# Patient Record
Sex: MALE | Race: WHITE | NOT HISPANIC OR LATINO | Employment: FULL TIME | URBAN - METROPOLITAN AREA
[De-identification: names, ages, dates, MRNs, and addresses within clinical notes are randomized per-mention and may not be internally consistent; named-entity substitution may affect disease eponyms.]

---

## 2016-12-29 NOTE — PROGRESS NOTES
SUBJECTIVE:                                                    Shlomo Nelson is a 26 year old male who presents to clinic today for the following health issues:      HPI       ABDOMINAL PAIN     Onset: November 2016     Description:   Character: fullness/bloating  Location: epigastric region  Radiation: None    Intensity: mild    Progression of Symptoms:  worsening    Accompanying Signs & Symptoms:  Fever/Chills?: no   Gas/Bloating: YES  Nausea: no   Vomitting: no   Diarrhea?: no   Constipation: Yes, every day to every 2 days   Dysuria or Hematuria: no    History:   Trauma: no   Previous similar pain: no    Previous tests done: none    Precipitating factors:   Does the pain change with:     Food: YES- worse feeling of bloating     BM: Yes    Urination: no     Alleviating factors:  none    Therapies Tried and outcome: none    LMP:  not applicable      - Did bowel cleanse, hurt for 2 days, then got better for 1.5-2 weeks, then symptoms returned for about 2 weeks off and on, now steady for 1 week  - Probiotic for 5 days now   - Pt never started taking Omeprazole (has at home)            Medication Followup of Chantix    Taking Medication as prescribed: yes, working really well     Side Effects:  None    Medication Helping Symptoms:  yes       Plan from 11/22/16  1. Tobacco    - Discussed chantix use, duration, and side effects    - Information provided     2. Abdominal pain    - Discussed with patient gastritis vs. GERD vs. Constipation vs. GB disorder    - Recommend start PPI, discussed use and side effects    - Also recommend bowel clean out followed by fiber supplementation    - Discussed if no change in 2 weeks, return to clinic, will consider ABD US, CT, and/or EGD    - If better, continue on PPI for 6-8 weeks then D/C, if symptoms return should return to clinic       Problem list and histories reviewed & adjusted, as indicated.  Additional history: as documented      Labs reviewed in EPIC  Problem list,  "Medication list, Allergies, and Medical/Social/Surgical histories reviewed in Psychiatric and updated as appropriate.    ROS:  Constitutional, HEENT, cardiovascular, pulmonary, gi and gu systems are negative, except as otherwise noted.    OBJECTIVE:                                                    /76 mmHg  Pulse 76  Temp(Src) 98.6  F (37  C) (Oral)  Resp 12  Ht 5' 7.72\" (1.72 m)  Wt 190 lb (86.183 kg)  BMI 29.13 kg/m2  SpO2 95%  Body mass index is 29.13 kg/(m^2).  GENERAL APPEARANCE: healthy, alert and no distress  EYES: Eyes grossly normal to inspection, PERRLA, conjunctivae and sclerae without injection or discharge, EOM intact   RESP: Lungs clear to auscultation - no rales, rhonchi or wheezes   CV: Regular rates and rhythm, normal S1 S2, no S3 or S4, no murmur, click or rub  ABDOMEN: Soft, mildly tender in epigastric area, no hepatosplenomegaly, no masses and bowel sounds normal, negative  Tolu's   MS: No musculoskeletal defects are noted and gait is age appropriate without ataxia   SKIN: No suspicious lesions or rashes, hydration status appears adeuqate with normal skin turgor    PSYCH: Alert and oriented x3; speech- coherent , normal rate and volume; able to articulate logical thoughts, able to abstract reason, no tangential thoughts, no hallucinations or delusions, mentation appears normal, Mood is euthymic. Affect is appropriate for this mood state and bright. Thought content is free of suicidal ideation, hallucinations, and delusions. Dress is adequate and upkept. Eye contact is good during conversation.       Diagnostic Test Results:  none      ASSESSMENT/PLAN:                                                        ICD-10-CM    1. Abdominal pain, epigastric R10.13 US Abdomen Complete   2. Tobacco use disorder F17.200 varenicline (CHANTIX) 1 MG tablet     - Reviewed with patient again possible etiology for symptoms including GERD vs. Gastritis vs. GB pathology   - Recommend he start omeprazole " 20 mg once daily, has medication at home, reviewed use and side effects  - Also recommend we get ultrasound to r/o GB pathology (patient also concerned for hiatal hernia, but discussed this is low on list)   - If persists on omeprazole, return to clinic, will increase dose and consider EGD   - Continue with Chantix, reviewed use and side effects     The patient indicates understanding of these issues and agrees with the plan.    Follow up: pending US and medication trial        Xiomara Moralez PA-C  [facility]

## 2017-01-02 ENCOUNTER — OFFICE VISIT (OUTPATIENT)
Dept: FAMILY MEDICINE | Facility: OTHER | Age: 27
End: 2017-01-02
Payer: COMMERCIAL

## 2017-01-02 VITALS
OXYGEN SATURATION: 95 % | RESPIRATION RATE: 12 BRPM | HEIGHT: 68 IN | BODY MASS INDEX: 28.79 KG/M2 | DIASTOLIC BLOOD PRESSURE: 76 MMHG | TEMPERATURE: 98.6 F | WEIGHT: 190 LBS | HEART RATE: 76 BPM | SYSTOLIC BLOOD PRESSURE: 112 MMHG

## 2017-01-02 DIAGNOSIS — R10.13 ABDOMINAL PAIN, EPIGASTRIC: Primary | ICD-10-CM

## 2017-01-02 DIAGNOSIS — F17.200 TOBACCO USE DISORDER: ICD-10-CM

## 2017-01-02 PROCEDURE — 99214 OFFICE O/P EST MOD 30 MIN: CPT | Performed by: PHYSICIAN ASSISTANT

## 2017-01-02 RX ORDER — VARENICLINE TARTRATE 1 MG/1
1 TABLET, FILM COATED ORAL 2 TIMES DAILY
Qty: 56 TABLET | Refills: 2 | Status: SHIPPED | OUTPATIENT
Start: 2017-01-02 | End: 2017-03-13

## 2017-01-02 NOTE — NURSING NOTE
"Chief Complaint   Patient presents with     Abdominal Pain     bloating     Smoking Cessation     Panel Management     height, flu, mychart       Initial /76 mmHg  Pulse 76  Temp(Src) 98.6  F (37  C) (Oral)  Resp 12  Ht 5' 7.72\" (1.72 m)  Wt 190 lb (86.183 kg)  BMI 29.13 kg/m2  SpO2 95% Estimated body mass index is 29.13 kg/(m^2) as calculated from the following:    Height as of this encounter: 5' 7.72\" (1.72 m).    Weight as of this encounter: 190 lb (86.183 kg).  BP completed using cuff size: regular  "

## 2017-01-02 NOTE — MR AVS SNAPSHOT
After Visit Summary   1/2/2017    Shlomo Nelson    MRN: [id number]           Patient Information     Date Of Birth          1990        Visit Information        Provider Department      1/2/2017 11:00 AM Xiomara Moralez PA-C [facility]        Today's Diagnoses     Abdominal pain, epigastric    -  1     Tobacco use disorder           Care Instructions    - Ultrasound of abdomen  - Start Omeprazole 20 mg         Follow-ups after your visit        Your next 10 appointments already scheduled     Jan 05, 2017  8:30 AM   US ABDOMEN COMPLETE with ERUS1   [facility]    [address] [id number] [phone]           Please bring a list of your medicines (including vitamins, minerals and over-the-counter drugs). Also, tell your doctor about any allergies you may have. Wear comfortable clothes and leave your valuables at home.  Adults: No eating or drinking for 8 hours before the exam. You may take medicine with a small sip of water.  Children: - Children 6+ years: No food or drink for 6 hours before exam. - Children 1-5 years: No food or drink for 4 hours before exam. - Infants, breast-fed: may have breast milk up to 2 hours before exam. - Infants, formula: may have bottle until 4 hours before exam.  Please call the Imaging Department at your exam site with any questions.              Future tests that were ordered for you today     Open Future Orders        Priority Expected Expires Ordered    US Abdomen Complete Routine  1/2/2018 1/2/2017            Who to contact     If you have questions or need follow up information about today's clinic visit or your schedule please contact [facility] directly at [phone].  Normal or non-critical lab and imaging results will be communicated to you by MyChart, letter or phone within 4 business days after the clinic has received the results. If you do  "not hear from us within 7 days, please contact the clinic through [employer] or phone. If you have a critical or abnormal lab result, we will notify you by phone as soon as possible.  Submit refill requests through [employer] or call your pharmacy and they will forward the refill request to us. Please allow 3 business days for your refill to be completed.          Additional Information About Your Visit        [employer] Information     [employer] lets you send messages to your doctor, view your test results, renew your prescriptions, schedule appointments and more. To sign up, go to [email or web address] . Click on \"Log in\" on the left side of the screen, which will take you to the Welcome page. Then click on \"Sign up Now\" on the right side of the page.     You will be asked to enter the access code listed below, as well as some personal information. Please follow the directions to create your username and password.     Your access code is: [email or web address]  Expires: 2017 11:44 AM     Your access code will  in 90 days. If you need help or a new code, please call your [place] clinic or [phone].        Your Vitals Were     Pulse Temperature Respirations Height BMI (Body Mass Index) Pulse Oximetry    76 98.6  F (37  C) (Oral) 12 5' 7.72\" (1.72 m) 29.13 kg/m2 95%       Blood Pressure from Last 3 Encounters:   17 112/76   16 120/78   16 124/74    Weight from Last 3 Encounters:   17 190 lb (86.183 kg)   16 192 lb (87.091 kg)   16 186 lb (84.369 kg)                 Today's Medication Changes          These changes are accurate as of: 17 12:20 PM.  If you have any questions, ask your nurse or doctor.               These medicines have changed or have updated prescriptions.        Dose/Directions    * varenicline 0.5 MG X 11 & 1 MG X 42 tablet   Commonly known as:  CHANTIX STARTING MONTH JAZZMINE   This may have changed:  Another medication with the same name was added. Make sure you " understand how and when to take each.   Used for:  Tobacco use disorder   Changed by:  Xiomara Moralez PA-C        Take 0.5 mg tab daily for 3 days, then 0.5 mg tab twice daily for 4 days, then 1 mg twice daily.   Quantity:  53 tablet   Refills:  0       * varenicline 1 MG tablet   Commonly known as:  CHANTIX   This may have changed:  You were already taking a medication with the same name, and this prescription was added. Make sure you understand how and when to take each.   Used for:  Tobacco use disorder   Changed by:  Xiomara Moralez PA-C        Dose:  1 mg   Take 1 tablet (1 mg) by mouth 2 times daily   Quantity:  56 tablet   Refills:  2       * Notice:  This list has 2 medication(s) that are the same as other medications prescribed for you. Read the directions carefully, and ask your doctor or other care provider to review them with you.         Where to get your medicines      These medications were sent to [facility] [address] [place] [address] [facility] AT [facility] [address]  [address] [facility], [facility] [address]     Phone:  [phone]    - varenicline 1 MG tablet             Primary Care Provider Office Phone # Fax #    Xiomara Moralez PA-C [phone]       [address]  [facility] [address]        Thank you!     Thank you for choosing [facility]  for your care. Our goal is always to provide you with excellent care. Hearing back from our patients is one way we can continue to improve our services. Please take a few minutes to complete the written survey that you may receive in the mail after your visit with us. Thank you!             Your Updated Medication List - Protect others around you: Learn how to safely use, store and throw away your medicines at www.disposemymeds.org.          This list is accurate as of: 1/2/17 12:20 PM.  Always use your most recent med list.                    Brand Name Dispense Instructions for use    MULTIVITAMIN ADULT PO          omeprazole 20 MG CR capsule    priLOSEC    30 capsule    Take 1 capsule (20 mg) by mouth daily       * varenicline 0.5 MG X 11 & 1 MG X 42 tablet    CHANTIX STARTING MONTH JAZZMINE    53 tablet    Take 0.5 mg tab daily for 3 days, then 0.5 mg tab twice daily for 4 days, then 1 mg twice daily.       * varenicline 1 MG tablet    CHANTIX    56 tablet    Take 1 tablet (1 mg) by mouth 2 times daily       * Notice:  This list has 2 medication(s) that are the same as other medications prescribed for you. Read the directions carefully, and ask your doctor or other care provider to review them with you.

## 2017-01-05 ENCOUNTER — RADIANT APPOINTMENT (OUTPATIENT)
Dept: ULTRASOUND IMAGING | Facility: OTHER | Age: 27
End: 2017-01-05
Attending: PHYSICIAN ASSISTANT
Payer: COMMERCIAL

## 2017-01-05 DIAGNOSIS — R10.13 ABDOMINAL PAIN, EPIGASTRIC: ICD-10-CM

## 2017-01-05 PROCEDURE — 76700 US EXAM ABDOM COMPLETE: CPT

## 2017-01-05 NOTE — PROGRESS NOTES
Quick Note:    Please call patient with the following message    Ultrasound of abdomen was normal.     Sandor Moralez PA-C    ______

## 2017-03-05 ENCOUNTER — TRANSFERRED RECORDS (OUTPATIENT)
Dept: HEALTH INFORMATION MANAGEMENT | Facility: CLINIC | Age: 27
End: 2017-03-05

## 2017-03-13 ENCOUNTER — OFFICE VISIT (OUTPATIENT)
Dept: OTOLARYNGOLOGY | Facility: CLINIC | Age: 27
End: 2017-03-13
Payer: COMMERCIAL

## 2017-03-13 ENCOUNTER — OFFICE VISIT (OUTPATIENT)
Dept: AUDIOLOGY | Facility: CLINIC | Age: 27
End: 2017-03-13
Payer: COMMERCIAL

## 2017-03-13 VITALS — BODY MASS INDEX: 29.74 KG/M2 | OXYGEN SATURATION: 97 % | HEART RATE: 85 BPM | WEIGHT: 194 LBS

## 2017-03-13 DIAGNOSIS — H90.42 SENSORINEURAL HEARING LOSS OF LEFT EAR WITH UNRESTRICTED HEARING OF CONTRALATERAL EAR: Primary | ICD-10-CM

## 2017-03-13 DIAGNOSIS — H65.02 ACUTE SEROUS OTITIS MEDIA, LEFT EAR: Primary | ICD-10-CM

## 2017-03-13 DIAGNOSIS — H69.92 DISORDER OF LEFT EUSTACHIAN TUBE: ICD-10-CM

## 2017-03-13 DIAGNOSIS — H91.92 LEFT EAR HEARING LOSS: ICD-10-CM

## 2017-03-13 PROCEDURE — 92557 COMPREHENSIVE HEARING TEST: CPT | Performed by: AUDIOLOGIST

## 2017-03-13 PROCEDURE — 99207 ZZC NO CHARGE LOS: CPT | Performed by: AUDIOLOGIST

## 2017-03-13 PROCEDURE — 92567 TYMPANOMETRY: CPT | Performed by: AUDIOLOGIST

## 2017-03-13 PROCEDURE — 99203 OFFICE O/P NEW LOW 30 MIN: CPT | Performed by: OTOLARYNGOLOGY

## 2017-03-13 RX ORDER — METHYLPREDNISOLONE 4 MG
TABLET, DOSE PACK ORAL
Qty: 21 TABLET | Refills: 0 | Status: SHIPPED | OUTPATIENT
Start: 2017-03-13 | End: 2017-07-11

## 2017-03-13 RX ORDER — AMOXICILLIN 500 MG/1
CAPSULE ORAL
Refills: 0 | COMMUNITY
Start: 2017-03-06 | End: 2017-07-11

## 2017-03-13 RX ORDER — HYDROCODONE BITARTRATE AND ACETAMINOPHEN 5; 325 MG/1; MG/1
TABLET ORAL
Refills: 0 | COMMUNITY
Start: 2017-03-06 | End: 2018-03-20

## 2017-03-13 NOTE — MR AVS SNAPSHOT
"              After Visit Summary   3/13/2017    Shlomo Nelson    MRN: [id number]           Patient Information     Date Of Birth          1990        Visit Information        Provider Department      3/13/2017 11:00 AM Roger Brown MD [facility]        Today's Diagnoses     Acute serous otitis media, left ear    -  1    Left ear hearing loss           Follow-ups after your visit        Additional Services     AUDIOLOGY ADULT REFERRAL                 Your next 10 appointments already scheduled     Apr 05, 2017  3:30 PM CDT   Return Visit with Roger Brown MD   [facility]    [address] [facility]  [address]  [facility] [id number] [phone]              Who to contact     If you have questions or need follow up information about today's clinic visit or your schedule please contact [facility] directly at [phone].  Normal or non-critical lab and imaging results will be communicated to you by MyChart, letter or phone within 4 business days after the clinic has received the results. If you do not hear from us within 7 days, please contact the clinic through MyChart or phone. If you have a critical or abnormal lab result, we will notify you by phone as soon as possible.  Submit refill requests through [employer] or call your pharmacy and they will forward the refill request to us. Please allow 3 business days for your refill to be completed.          Additional Information About Your Visit        MyChart Information     [employer] lets you send messages to your doctor, view your test results, renew your prescriptions, schedule appointments and more. To sign up, go to [email or web address] . Click on \"Log in\" on the left side of the screen, which will take you to the Welcome page. Then click on \"Sign up Now\" on the right side of the page.     You will be asked to enter the access code listed below, as well as some personal " information. Please follow the directions to create your username and password.     Your access code is: [email or web address]  Expires: 2017 11:20 AM     Your access code will  in 90 days. If you need help or a new code, please call your [place] clinic or [phone].        Care EveryWhere ID     This is your Care EveryWhere ID. This could be used by other organizations to access your [place] medical records  [id number]        Your Vitals Were     Pulse Pulse Oximetry BMI (Body Mass Index)             85 97% 29.74 kg/m2          Blood Pressure from Last 3 Encounters:   17 112/76   16 120/78   16 124/74    Weight from Last 3 Encounters:   17 88 kg (194 lb)   17 86.2 kg (190 lb)   16 87.1 kg (192 lb)              We Performed the Following     AUDIOLOGY ADULT REFERRAL          Today's Medication Changes          These changes are accurate as of: 3/13/17 12:02 PM.  If you have any questions, ask your nurse or doctor.               Start taking these medicines.        Dose/Directions    methylPREDNISolone 4 MG tablet   Commonly known as:  MEDROL DOSEPAK   Used for:  Acute serous otitis media, left ear   Started by:  Roger Brown MD        Follow package instructions   Quantity:  21 tablet   Refills:  0            Where to get your medicines      These medications were sent to [facility] [address] [place] [address] [facility] AT [facility] of [address]  [address] [facility], [facility] [address]     Phone:  [phone]     methylPREDNISolone 4 MG tablet                Primary Care Provider Office Phone # Fax #    Xiomarahenry Moralez PA-C [phone]       [facility] [address]  [facility] [address]        Thank you!     Thank you for choosing [facility]  for your care. Our goal is always to provide you with excellent care. Hearing back from our patients is one way we can continue to improve  our services. Please take a few minutes to complete the written survey that you may receive in the mail after your visit with us. Thank you!             Your Updated Medication List - Protect others around you: Learn how to safely use, store and throw away your medicines at www.disposemymeds.org.          This list is accurate as of: 3/13/17 12:02 PM.  Always use your most recent med list.                   Brand Name Dispense Instructions for use    amoxicillin 500 MG capsule    AMOXIL     TK 2 CS PO BID TAT       HYDROcodone-acetaminophen 5-325 MG per tablet    NORCO     TK 1-2 TS PO Q 4 H PRN P  NM4       methylPREDNISolone 4 MG tablet    MEDROL DOSEPAK    21 tablet    Follow package instructions       MULTIVITAMIN ADULT PO          omeprazole 20 MG CR capsule    priLOSEC    30 capsule    Take 1 capsule (20 mg) by mouth daily

## 2017-03-13 NOTE — PROGRESS NOTES
History of Present Illness - Shlomo Nelson is a 26 year old male who presents today with a history of left side ear pain, which has been a problem for 8 days. The patient has experienced 1 episodes of otitis media in the past 6 months and 1 episodes in the past year, per the parent's report. Patient was seen at [facility] ED and was given Amoxicillin 500mg BID  and Norco 5-325mg     8 days ago he had pain and pressure in his ear.  4 days later he flew in an airplane.  He has had discharge from his left ear.  Since he had the discharge hearing has been reduced on the left side.    He has had mild occasional tinnitus.    He denies reoccurant ear infections.    Current Medications -   Current Outpatient Prescriptions:      omeprazole (PRILOSEC) 20 MG capsule, Take 1 capsule (20 mg) by mouth daily, Disp: 30 capsule, Rfl: 1     Multiple Vitamins-Minerals (MULTIVITAMIN ADULT PO), , Disp: , Rfl:      amoxicillin (AMOXIL) 500 MG capsule, TK 2 CS PO BID TAT, Disp: , Rfl: 0     HYDROcodone-acetaminophen (NORCO) 5-325 MG per tablet, TK 1-2 TS PO Q 4 H PRN P  NM4, Disp: , Rfl: 0    Allergies - No Known Allergies    Social History -   Social History     Social History     Marital status: Single     Spouse name: N/A     Number of children: N/A     Years of education: N/A     Social History Main Topics     Smoking status: Former Smoker     Smokeless tobacco: Current User     Types: Chew     Alcohol use 0.0 oz/week     0 Standard drinks or equivalent per week      Comment: once a week; few drinks     Drug use: No     Sexual activity: Yes     Partners: Female     Birth control/ protection: Condom, Pill     Other Topics Concern     Not on file     Social History Narrative       Family History -   Family History   Problem Relation Age of Onset     Lipids Father      Hypertension Maternal Grandmother      Hypertension Maternal Grandfather      DIABETES Paternal Grandmother      Hypertension Paternal Grandmother      Arthritis Paternal  Grandmother      DIABETES Paternal Grandfather      Hypertension Paternal Grandfather      Prostate Cancer Paternal Grandfather      Cancer - colorectal Paternal Grandfather      CANCER Paternal Grandfather      kidney     Asthma No family hx of      C.A.D. No family hx of      CEREBROVASCULAR DISEASE No family hx of      Alcohol/Drug No family hx of      Allergies No family hx of      Alzheimer Disease No family hx of      Anesthesia Reaction No family hx of      Blood Disease No family hx of      Cardiovascular No family hx of      Circulatory No family hx of      Congenital Anomalies No family hx of      Connective Tissue Disorder No family hx of      Depression No family hx of      Eye Disorder No family hx of      Genetic Disorder No family hx of      Genitourinary Problems No family hx of      Gynecology No family hx of      HEART DISEASE No family hx of      Musculoskeletal Disorder No family hx of      Neurologic Disorder No family hx of      Obesity No family hx of      OSTEOPOROSIS No family hx of      Psychotic Disorder No family hx of      Respiratory No family hx of      Thyroid Disease No family hx of      Hearing Loss No family hx of      GASTROINTESTINAL DISEASE Brother      IBS       Review of Systems - As per HPI and PMHx, otherwise review system review of the head and neck negative.    Physical Exam  Vitals: Pulse 85  Wt 88 kg (194 lb)  SpO2 97%  BMI 29.74 kg/m2  BMI= Body mass index is 29.74 kg/(m^2).    General - The patient is well nourished and well developed, and appears to have good nutritional status. Age-appropriate activity and behavior.    Head and Face - Normocephalic and atraumatic, with no gross asymmetry noted of the contour of the facial features.  The facial nerve is intact, with strong symmetric movements.    Voice and Breathing - The patient was breathing comfortably without the use of accessory muscles. There was no wheezing, stridor, or stertor.  The patients voice was clear  and strong, and had appropriate pitch and quality.    Ears - Bilateral pinna and EACs with normal appearing overlying skin. Tympanic membrane intact with good mobility on pneumatic otoscopy bilaterally. Some fluid noted in left ear.  Bony landmarks of the ossicular chain are normal. The tympanic membranes are normal in appearance. No retraction, perforation, or masses.  No fluid or purulence was seen in the external canal or the middle ear. Estrellita equal on both ride Rine AC greater than BC.    Eyes - Extraocular movements intact.  Sclera were not icteric or injected, conjunctiva were pink and moist.    Mouth - Examination of the oral cavity showed pink, healthy oral mucosa. No lesions or ulcerations noted.  The tongue was mobile and midline, and the dentition were in good condition.      Throat - The walls of the oropharynx were smooth, pink, moist, symmetric, and had no lesions or ulcerations.  The tonsillar pillars and soft palate were symmetric.  The uvula was midline on elevation.  Post nasal drainage.    Neck - Normal midline excursion of the laryngotracheal complex during swallowing.  Full range of motion on passive movement.  Palpation of the occipital, submental, submandibular, internal jugular chain, and supraclavicular nodes did not demonstrate any abnormal lymph nodes or masses.  The carotid pulse was palpable bilaterally.  Palpation of the thyroid was soft and smooth, with no nodules or goiter appreciated.  The trachea was mobile and midline.    Nose - External contour is symmetric, no gross deflection or scars.  Nasal mucosa is pink and moist with no abnormal mucus.  The septum was midline and non-obstructive, turbinates of normal size and position.  No polyps, masses, or purulence noted on examination.      Audiogram today demonstrates Mild sensory hearing loss in the left ear and normal on the right. Tympanograms are type B on the left with flat volume and type A on the right.    LUANN - Shlomo Nelson  "is a 26 year old male with Otitis media and hearing loss.  We used a nasal decongestant in clinic to see his he could successfully \"pop\" his ear and feel relief. At this time I will prescribe a Medrol dose pack to reduce inflammation in the ear.  He should complete his antibiotic.  He can use a over the counter saline solution.  The rest of today's appointment was spent in education about hearing loss in relation to ear infections.  He should follow up with me in clinic in 3 weeks with a new audiogram.     Progress note was partially captured by Benita Garcia MA and reviewed/addended by Dr. Brown for accuracy and content.      Roger Brown MD      "

## 2017-03-13 NOTE — PROGRESS NOTES
Patient was referred to Audiology from ENT by Dr. Brown for a hearing examination. Patient reports significant otalgia in his left ear starting over a week ago. Patient also reports decreased/muffled hearing in his left ear.     Testing:    Otoscopy:   Clear canals free of cerumen bilaterally.     Tympanograms:   Tympanometric findings were normal ear canal volume and compliance (Type A) for the right ear and normal ear canal volume with no compliance (Type B) for the left ear.     Thresholds:   Puretone thresholds obtained with insert earphones show normal hearing sensitivity for all frequencies tested in the right ear and mild sensorineural hearing loss in the left ear (see scanned audiogram). Speech reception thresholds were in agreement with puretone findings bilaterally. Speech discrimination scores were excellent bilaterally (Right: 100%; Left: 100%).     Discussed results with the patient.     Patient was returned to ENT for follow up.     Kelby Macias CCC-SHYAM  Licensed Audiologist  3/13/2017

## 2017-03-13 NOTE — NURSING NOTE
"Chief Complaint   Patient presents with     Consult     Ent Problem     ear infection       Initial Pulse 85  Wt 88 kg (194 lb)  SpO2 97%  BMI 29.74 kg/m2 Estimated body mass index is 29.74 kg/(m^2) as calculated from the following:    Height as of 1/2/17: 1.72 m (5' 7.72\").    Weight as of this encounter: 88 kg (194 lb).  Medication Reconciliation: complete  "

## 2017-03-13 NOTE — MR AVS SNAPSHOT
"              After Visit Summary   3/13/2017    Shlomo Nelson    MRN: [id number]           Patient Information     Date Of Birth          1990        Visit Information        Provider Department      3/13/2017 11:00 AM Kelby Vogel [facility]        Today's Diagnoses     Sensorineural hearing loss of left ear with unrestricted hearing of contralateral ear    -  1    Disorder of left eustachian tube           Follow-ups after your visit        Who to contact     If you have questions or need follow up information about today's clinic visit or your schedule please contact [facility] directly at [phone].  Normal or non-critical lab and imaging results will be communicated to you by Omazehart, letter or phone within 4 business days after the clinic has received the results. If you do not hear from us within 7 days, please contact the clinic through Omazehart or phone. If you have a critical or abnormal lab result, we will notify you by phone as soon as possible.  Submit refill requests through [employer] or call your pharmacy and they will forward the refill request to us. Please allow 3 business days for your refill to be completed.          Additional Information About Your Visit        MyChart Information     [employer] lets you send messages to your doctor, view your test results, renew your prescriptions, schedule appointments and more. To sign up, go to [email or web address] . Click on \"Log in\" on the left side of the screen, which will take you to the Welcome page. Then click on \"Sign up Now\" on the right side of the page.     You will be asked to enter the access code listed below, as well as some personal information. Please follow the directions to create your username and password.     Your access code is: [email or web address]  Expires: 2017 11:20 AM     Your access code will  in 90 days. If you need help or a new code, please call your [facility] or " [phone].        Care EveryWhere ID     This is your Care EveryWhere ID. This could be used by other organizations to access your [facility] medical records  [id number]         Blood Pressure from Last 3 Encounters:   01/02/17 112/76   11/22/16 120/78   02/26/16 124/74    Weight from Last 3 Encounters:   01/02/17 190 lb (86.2 kg)   11/22/16 192 lb (87.1 kg)   02/26/16 186 lb (84.4 kg)              We Performed the Following     AUDIOGRAM/TYMPANOGRAM - INTERFACE     COMPREHENSIVE HEARING TEST     TYMPANOMETRY        Primary Care Provider Office Phone # Fax #    Xiomara GRANADOS JUAN Moralez [phone]       [facility] [address]  [facility] [address]        Thank you!     Thank you for choosing [facility]  for your care. Our goal is always to provide you with excellent care. Hearing back from our patients is one way we can continue to improve our services. Please take a few minutes to complete the written survey that you may receive in the mail after your visit with us. Thank you!             Your Updated Medication List - Protect others around you: Learn how to safely use, store and throw away your medicines at www.disposemymeds.org.          This list is accurate as of: 3/13/17 11:20 AM.  Always use your most recent med list.                   Brand Name Dispense Instructions for use    MULTIVITAMIN ADULT PO          omeprazole 20 MG CR capsule    priLOSEC    30 capsule    Take 1 capsule (20 mg) by mouth daily       * varenicline 0.5 MG X 11 & 1 MG X 42 tablet    CHANTIX STARTING MONTH JAZZMINE    53 tablet    Take 0.5 mg tab daily for 3 days, then 0.5 mg tab twice daily for 4 days, then 1 mg twice daily.       * varenicline 1 MG tablet    CHANTIX    56 tablet    Take 1 tablet (1 mg) by mouth 2 times daily       * Notice:  This list has 2 medication(s) that are the same as other medications prescribed for you. Read the directions carefully, and ask your  doctor or other care provider to review them with you.

## 2017-07-06 NOTE — PROGRESS NOTES
"  SUBJECTIVE:                                                    Shlomo Nelson is a 27 year old male who presents to clinic today for the following health issues:  {Provider please address medication reconciliation discrepancies--rooming staff please delete if no med/rec issues}    HPI    {additional problems for roomer to add, delete if none:711155}    Problem list and histories reviewed & adjusted, as indicated.  Additional history: {NONE - AS DOCUMENTED:825246::\"as documented\"}    {ACUTE Problem SUPERLIST - brief histories:953677}    {HIST REVIEW/ LINKS 2:071313}    {PROVIDER CHARTING PREFERENCE:397348}  "

## 2017-07-11 ENCOUNTER — OFFICE VISIT (OUTPATIENT)
Dept: FAMILY MEDICINE | Facility: OTHER | Age: 27
End: 2017-07-11
Payer: COMMERCIAL

## 2017-07-11 VITALS
BODY MASS INDEX: 28.41 KG/M2 | HEART RATE: 76 BPM | RESPIRATION RATE: 16 BRPM | TEMPERATURE: 98.8 F | DIASTOLIC BLOOD PRESSURE: 82 MMHG | OXYGEN SATURATION: 99 % | SYSTOLIC BLOOD PRESSURE: 128 MMHG | HEIGHT: 69 IN | WEIGHT: 191.8 LBS

## 2017-07-11 DIAGNOSIS — M62.81 MUSCLE WEAKNESS (GENERALIZED): ICD-10-CM

## 2017-07-11 DIAGNOSIS — F17.200 TOBACCO USE DISORDER: ICD-10-CM

## 2017-07-11 DIAGNOSIS — Z00.00 ENCOUNTER FOR ROUTINE ADULT HEALTH EXAMINATION WITHOUT ABNORMAL FINDINGS: Primary | ICD-10-CM

## 2017-07-11 LAB
ERYTHROCYTE [DISTWIDTH] IN BLOOD BY AUTOMATED COUNT: 13.3 % (ref 10–15)
ERYTHROCYTE [SEDIMENTATION RATE] IN BLOOD BY WESTERGREN METHOD: 4 MM/H (ref 0–15)
HCT VFR BLD AUTO: 41 % (ref 40–53)
HGB BLD-MCNC: 14.6 G/DL (ref 13.3–17.7)
MCH RBC QN AUTO: 30.7 PG (ref 26.5–33)
MCHC RBC AUTO-ENTMCNC: 35.6 G/DL (ref 31.5–36.5)
MCV RBC AUTO: 86 FL (ref 78–100)
PLATELET # BLD AUTO: 125 10E9/L (ref 150–450)
RBC # BLD AUTO: 4.76 10E12/L (ref 4.4–5.9)
WBC # BLD AUTO: 8.6 10E9/L (ref 4–11)

## 2017-07-11 PROCEDURE — 85027 COMPLETE CBC AUTOMATED: CPT | Performed by: PHYSICIAN ASSISTANT

## 2017-07-11 PROCEDURE — 86618 LYME DISEASE ANTIBODY: CPT | Performed by: PHYSICIAN ASSISTANT

## 2017-07-11 PROCEDURE — 80053 COMPREHEN METABOLIC PANEL: CPT | Performed by: PHYSICIAN ASSISTANT

## 2017-07-11 PROCEDURE — 00000167 ZZHCL STATISTIC INR NC: Performed by: PHYSICIAN ASSISTANT

## 2017-07-11 PROCEDURE — 85613 RUSSELL VIPER VENOM DILUTED: CPT | Performed by: PHYSICIAN ASSISTANT

## 2017-07-11 PROCEDURE — 85652 RBC SED RATE AUTOMATED: CPT | Performed by: PHYSICIAN ASSISTANT

## 2017-07-11 PROCEDURE — 99000 SPECIMEN HANDLING OFFICE-LAB: CPT | Performed by: PHYSICIAN ASSISTANT

## 2017-07-11 PROCEDURE — 36415 COLL VENOUS BLD VENIPUNCTURE: CPT | Performed by: PHYSICIAN ASSISTANT

## 2017-07-11 PROCEDURE — 86431 RHEUMATOID FACTOR QUANT: CPT | Performed by: PHYSICIAN ASSISTANT

## 2017-07-11 PROCEDURE — 84443 ASSAY THYROID STIM HORMONE: CPT | Performed by: PHYSICIAN ASSISTANT

## 2017-07-11 PROCEDURE — 99213 OFFICE O/P EST LOW 20 MIN: CPT | Mod: 25 | Performed by: PHYSICIAN ASSISTANT

## 2017-07-11 PROCEDURE — 85730 THROMBOPLASTIN TIME PARTIAL: CPT | Performed by: PHYSICIAN ASSISTANT

## 2017-07-11 PROCEDURE — 99395 PREV VISIT EST AGE 18-39: CPT | Performed by: PHYSICIAN ASSISTANT

## 2017-07-11 PROCEDURE — 00000401 ZZHCL STATISTIC THROMBIN TIME NC: Performed by: PHYSICIAN ASSISTANT

## 2017-07-11 PROCEDURE — 86140 C-REACTIVE PROTEIN: CPT | Performed by: PHYSICIAN ASSISTANT

## 2017-07-11 PROCEDURE — 86039 ANTINUCLEAR ANTIBODIES (ANA): CPT | Mod: 90 | Performed by: PHYSICIAN ASSISTANT

## 2017-07-11 ASSESSMENT — ENCOUNTER SYMPTOMS
NECK PAIN: 0
VISUAL CHANGE: 0
JOINT SWELLING: 1
SORE THROAT: 0
COUGH: 0
FEVER: 0
VOMITING: 0
CHILLS: 0
LEG PAIN: 1
HEADACHES: 0
WEAKNESS: 1
FATIGUE: 1
NUMBNESS: 0
ABDOMINAL PAIN: 0
NAUSEA: 0

## 2017-07-11 ASSESSMENT — PAIN SCALES - GENERAL: PAINLEVEL: NO PAIN (0)

## 2017-07-11 NOTE — NURSING NOTE
"Chief Complaint   Patient presents with     Leg Pain     Both     Health Maintenance     mychart       Initial /82  Pulse 76  Temp 98.8  F (37.1  C) (Oral)  Resp 16  Ht 5' 8.9\" (1.75 m)  Wt 191 lb 12.8 oz (87 kg)  SpO2 99%  BMI 28.41 kg/m2 Estimated body mass index is 28.41 kg/(m^2) as calculated from the following:    Height as of this encounter: 5' 8.9\" (1.75 m).    Weight as of this encounter: 191 lb 12.8 oz (87 kg).  Medication Reconciliation: complete    "

## 2017-07-11 NOTE — PROGRESS NOTES
SUBJECTIVE:   CC: Shlomo Nelson is an 27 year old male who presents for preventative health visit.         Leg Pain   The current episode started 1 to 4 weeks ago. The problem occurs daily. The problem has been unchanged. Associated symptoms include fatigue, joint swelling and weakness. Pertinent negatives include no abdominal pain, chest pain, chills, congestion, coughing, fever, headaches, nausea, neck pain, numbness, rash, sore throat, visual change or vomiting. Exacerbated by: any weight bearing  Treatments tried: none  The treatment provided mild relief.   Physical   Annual:     Getting at least 3 servings of Calcium per day::  Yes    Bi-annual eye exam::  NO    Dental care twice a year::  Yes    Sleep apnea or symptoms of sleep apnea::  None    Diet::  Regular (no restrictions)    Frequency of exercise::  2-3 days/week    Duration of exercise::  30-45 minutes    Taking medications regularly::  Yes    Medication side effects::  None    Additional concerns today::  YES    Leg pain  - worse with weight bearing  - Has to shift when standing   - Seems to be in calves   - Weakness in muscle   - Started in knees and now calf   - Little bit in arms, elbows   - No swelling   - No like pain, more cramping   - Comes and goes, moves around, nothing in trunk area   - Pt states it could possible be lyme's disease       Did pull a couple off this summer   - Back in 2014 was worked up, just was in thighs   - Did try vitamin D for awhile, thigh pain went away   - Almost like they are restless but sleep is fine  - Patient states was rocking his kid last night and all of a sudden lower calves went weak        Today's PHQ-2 Score:   PHQ-2 ( 1999 [employer]) 7/11/2017   Q1: Little interest or pleasure in doing things 0   Q2: Feeling down, depressed or hopeless 0   PHQ-2 Score 0   Q1: Little interest or pleasure in doing things Not at all   Q2: Feeling down, depressed or hopeless Not at all   PHQ-2 Score 0     Answers for HPI/ROS  submitted by the patient on 7/11/2017   PHQ-2 Score: 0    Abuse: Current or Past(Physical, Sexual or Emotional)- No  Do you feel safe in your environment - Yes    Social History   Substance Use Topics     Smoking status: Former Smoker     Smokeless tobacco: Current User     Types: Chew     Alcohol use 0.0 oz/week     0 Standard drinks or equivalent per week      Comment: once a week; few drinks          Standardized Alcohol Screening Questionnaire  AUDIT   Questions 0 1 2 3 4 Score   1. How often do you have a drink  containing alcohol? Never Monthly or less 2 to 4  times a  month 2 to 3  times a  week 4 or more  times a  week  3   2. How many drinks containing alcohol  do you have on a typical day when you are drinking? 1 or 2 3 or 4 5 or 6 7 to 9 10 or more  2   3. How often do you have more than five  or more drinks on one occasion? Never Less  than  monthly Monthly Weekly Daily or  almost  daily  3   4. How often during the last year have  you found that you were not able to stop drinking once you had started? Never Less  than  monthly Monthly Weekly Daily or  almost  daily  0   5. How often during the last year have  you failed to do what was normally expected of you because of drinking? Never Less  than  monthly Monthly Weekly Daily or  almost  daily  0   6. How often during the last year have  you needed a first drink in the morning to get yourself going after a heavy drinking session? Never Less  than  monthly Monthly Weekly Daily or  almost  daily  0   7. How often during the last year have you had a feeling of guilt or remorse after drinking? Never Less  than  monthly Monthly Weekly Daily or  almost  daily  0   8. How often during the last year have  you been unable to remember what happened the night before because of your drinking? Never Less  than  monthly Monthly Weekly Daily or  almost  daily  0   9. Have you or someone else been  injured because of your drinking? No  Yes, but not in the last year   Yes,  during the  last year  0   10. Has a relative, friend, doctor or other health care worker been concerned about your drinking or suggested you cut down? No  Yes, but not in the last year  Yes,  during the  last year  0   Total  8   Scoring: A score of 7 for adult men is an indication of hazardous drinking (risk for physical or physiological harm); a score of 8 or more is an indication of an alcohol use disorder. A score of 5 or more for adult women  is an indication of hazardous drinking or an alcohol use disorder.       Last PSA: No results found for: PSA    Reviewed orders with patient. Reviewed health maintenance and updated orders accordingly - Yes  Labs reviewed in EPIC  BP Readings from Last 3 Encounters:   07/11/17 128/82   01/02/17 112/76   11/22/16 120/78    Wt Readings from Last 3 Encounters:   07/11/17 191 lb 12.8 oz (87 kg)   03/13/17 194 lb (88 kg)   01/02/17 190 lb (86.2 kg)                    Reviewed and updated as needed this visit by clinical staff  Tobacco  Allergies  Med Hx  Surg Hx  Fam Hx  Soc Hx        Reviewed and updated as needed this visit by Provider  Allergies  Meds  Problems        Past Medical History:   Diagnosis Date     NO ACTIVE PROBLEMS       Past Surgical History:   Procedure Laterality Date     FINGER SURGERY         ROS:  C: NEGATIVE for fever, chills, change in weight  I: NEGATIVE for worrisome rashes, moles or lesions  E: NEGATIVE for vision changes or irritation  ENT: NEGATIVE for ear, mouth and throat problems  R: NEGATIVE for significant cough or SOB  CV: NEGATIVE for chest pain, palpitations or peripheral edema  GI: NEGATIVE for nausea, abdominal pain, heartburn, or change in bowel habits   male: negative for dysuria, hematuria, decreased urinary stream, erectile dysfunction, urethral discharge  M: NEGATIVE for significant arthralgias or myalgia  N: NEGATIVE for weakness, dizziness or paresthesias  P: NEGATIVE for changes in mood or affect    OBJECTIVE:  "  /82  Pulse 76  Temp 98.8  F (37.1  C) (Oral)  Resp 16  Ht 5' 8.9\" (1.75 m)  Wt 191 lb 12.8 oz (87 kg)  SpO2 99%  BMI 28.41 kg/m2    EXAM:  GENERAL: healthy, alert and no distress  EYES: Eyes grossly normal to inspection, PERRL and conjunctivae and sclerae normal  HENT: ear canals and TM's normal, nose and mouth without ulcers or lesions  NECK: no adenopathy, no asymmetry, masses, or scars and thyroid normal to palpation  RESP: lungs clear to auscultation - no rales, rhonchi or wheezes  CV: regular rate and rhythm, normal S1 S2, no S3 or S4, no murmur, click or rub, no peripheral edema and peripheral pulses strong  ABDOMEN: soft, nontender, no hepatosplenomegaly, no masses and bowel sounds normal  MS: no gross musculoskeletal defects noted, no edema       Bilateral hips and knees: Normal ROM, non-tender, no edema, CMS intact, normal sensory exam, normal strength       Bilateral elbow: Normal ROM, non-tender, no edema, CMS intact, normal sensory exam, normal strength         Trigger point testing - largely negative, only a few spots on lower legs positive          Bilateral upper and lower extremities with normal sensation and strength, no weakness noted           Back: Normal ROM, non-tender, no edema, CMS intact, normal sensory exam, normal strength  SKIN: no suspicious lesions or rashes  NEURO: Normal strength and tone, mentation intact and speech normal, CNII-X intact          Normal sensation and strength upper and lower extremities   PSYCH: mentation appears normal, affect normal/bright    ASSESSMENT/PLAN:       ICD-10-CM    1. Encounter for routine adult health examination without abnormal findings Z00.00    2. Tobacco use disorder F17.200    3. Muscle weakness (generalized) M62.81 Lyme Disease Hillary with reflex to WB Serum     TSH with free T4 reflex     CBC with platelets     Comprehensive metabolic panel     Nuclear Antibody KIKE by IFA IgG     Rheumatoid factor     ESR: Erythrocyte sedimentation " "rate     CRP, inflammation     Lupus Anticoagulant Panel     OFFICE/OUTPT VISIT,EST,LEVL IV       1. Physical  - UTD on TDap and labs  - Recheck yearly     2. Advised cessation, patient declined     3. Muscle weakness  - Per patient, not the same weakness that was in hip that was evaluated in 2014   - Discussed with patient possible etiology including autoimmune, neurological, infectious, or muscular, such as Grave's, MS, Lupus, Lyme, electrolyte imbalances, RA, fibromyalgia or other   - Exam today largely normal and not suggestive of any of these   - Recommend we get labs today   - Results will be communicated to patient when available and appropriate medication changes made if necessary   - Also recommend patient start stretching regimen (upper and lower) 3-4x/daily to see if helps muscles as his physical job may be to blame   - If labs normal, will plan to get EMG and/or refer to neurology for further evaluation         COUNSELING:   Reviewed preventive health counseling, as reflected in patient instructions  Special attention given to:        Regular exercise       Healthy diet/nutrition       Alcohol Use     reports that he has quit smoking. His smokeless tobacco use includes Chew.  Tobacco Cessation Action Plan: Information offered: Patient not interested at this time  Estimated body mass index is 28.41 kg/(m^2) as calculated from the following:    Height as of this encounter: 5' 8.9\" (1.75 m).    Weight as of this encounter: 191 lb 12.8 oz (87 kg).       Counseling Resources:  ATP IV Guidelines  Pooled Cohorts Equation Calculator  FRAX Risk Assessment  ICSI Preventive Guidelines  Dietary Guidelines for Americans, 2010  USDA's MyPlate  ASA Prophylaxis  Lung CA Screening    In addition to the preventive visit, 25  minutes of the appointment were spent evaluating and developing a treatment plan for his additional concern(s).        Xiomara Moralez PA-C  [facility]"

## 2017-07-11 NOTE — MR AVS SNAPSHOT
"              After Visit Summary   7/11/2017    Shlomo Nelson    MRN: [id number]           Patient Information     Date Of Birth          1990        Visit Information        Provider Department      7/11/2017 4:00 PM Xiomara Moralez PA-C [facility]         Follow-ups after your visit        Your next 10 appointments already scheduled     Jul 11, 2017  4:00 PM CDT   PHYSICAL with Xiomara Moralez PA-C   [facility]    [address]  [facility] [address]   [phone]              Who to contact     If you have questions or need follow up information about today's clinic visit or your schedule please contact [facility] directly at [phone].  Normal or non-critical lab and imaging results will be communicated to you by MyChart, letter or phone within 4 business days after the clinic has received the results. If you do not hear from us within 7 days, please contact the clinic through MyChart or phone. If you have a critical or abnormal lab result, we will notify you by phone as soon as possible.  Submit refill requests through [employer] or call your pharmacy and they will forward the refill request to us. Please allow 3 business days for your refill to be completed.          Additional Information About Your Visit        MyChart Information     [employer] lets you send messages to your doctor, view your test results, renew your prescriptions, schedule appointments and more. To sign up, go to [email or web address] . Click on \"Log in\" on the left side of the screen, which will take you to the Welcome page. Then click on \"Sign up Now\" on the right side of the page.     You will be asked to enter the access code listed below, as well as some personal information. Please follow the directions to create your username and password.     Your access code is: [email or web address]  Expires: 10/9/2017  3:42 PM   "   Your access code will  in 90 days. If you need help or a new code, please call your [facility] clinic or [phone].        Care EveryWhere ID     This is your Care EveryWhere ID. This could be used by other organizations to access your [facility] medical records  [id number]         Blood Pressure from Last 3 Encounters:   17 112/76   16 120/78   16 124/74    Weight from Last 3 Encounters:   17 194 lb (88 kg)   17 190 lb (86.2 kg)   16 192 lb (87.1 kg)              Today, you had the following     No orders found for display       Primary Care Provider Office Phone # Fax #    Xiomara GRANADOS JUAN Moralez [phone]       [facility] [address]  [facility] [address]        Equal Access to Services     JOHNIE SMITH : Hadii aad ku hadasho Soomaali, waaxda luqadaha, qaybta kaalmada adeegyada, waxay idiin haytimin marley last . So [facility] [phone].    ATENCIÓN: Si habla español, tiene a grissom disposición servicios gratuitos de asistencia lingüística. Mallory al [phone].    We comply with applicable federal civil rights laws and [place] laws. We do not discriminate on the basis of race, color, national origin, age, disability sex, sexual orientation or gender identity.            Thank you!     Thank you for choosing [facility]  for your care. Our goal is always to provide you with excellent care. Hearing back from our patients is one way we can continue to improve our services. Please take a few minutes to complete the written survey that you may receive in the mail after your visit with us. Thank you!             Your Updated Medication List - Protect others around you: Learn how to safely use, store and throw away your medicines at www.disposemymeds.org.          This list is accurate as of: 17  3:42 PM.  Always use your most recent med list.                   Brand Name Dispense Instructions for  use Diagnosis    amoxicillin 500 MG capsule    AMOXIL     TK 2 CS PO BID TAT        HYDROcodone-acetaminophen 5-325 MG per tablet    NORCO     TK 1-2 TS PO Q 4 H PRN P  NM4        methylPREDNISolone 4 MG tablet    MEDROL DOSEPAK    21 tablet    Follow package instructions    Acute serous otitis media, left ear       MULTIVITAMIN ADULT PO           omeprazole 20 MG CR capsule    priLOSEC    30 capsule    Take 1 capsule (20 mg) by mouth daily    Abdominal pain, epigastric

## 2017-07-12 LAB
ALBUMIN SERPL-MCNC: 4.7 G/DL (ref 3.4–5)
ALP SERPL-CCNC: 78 U/L (ref 40–150)
ALT SERPL W P-5'-P-CCNC: 42 U/L (ref 0–70)
ANION GAP SERPL CALCULATED.3IONS-SCNC: 8 MMOL/L (ref 3–14)
AST SERPL W P-5'-P-CCNC: 19 U/L (ref 0–45)
BILIRUB SERPL-MCNC: 0.6 MG/DL (ref 0.2–1.3)
BUN SERPL-MCNC: 16 MG/DL (ref 7–30)
CALCIUM SERPL-MCNC: 8.9 MG/DL (ref 8.5–10.1)
CHLORIDE SERPL-SCNC: 104 MMOL/L (ref 94–109)
CO2 SERPL-SCNC: 29 MMOL/L (ref 20–32)
CREAT SERPL-MCNC: 0.95 MG/DL (ref 0.66–1.25)
CRP SERPL-MCNC: <2.9 MG/L (ref 0–8)
GFR SERPL CREATININE-BSD FRML MDRD: NORMAL ML/MIN/1.7M2
GLUCOSE SERPL-MCNC: 97 MG/DL (ref 70–99)
POTASSIUM SERPL-SCNC: 3.8 MMOL/L (ref 3.4–5.3)
PROT SERPL-MCNC: 7.7 G/DL (ref 6.8–8.8)
SODIUM SERPL-SCNC: 141 MMOL/L (ref 133–144)
TSH SERPL DL<=0.005 MIU/L-ACNC: 1.99 MU/L (ref 0.4–4)

## 2017-07-13 LAB
B BURGDOR IGG+IGM SER QL: 0.07 (ref 0–0.89)
RHEUMATOID FACT SER NEPH-ACNC: <20 IU/ML (ref 0–20)

## 2017-07-14 LAB
LA PPP-IMP: NORMAL
NUCLEAR IGG TITR SER IF: NORMAL {TITER}

## 2017-07-17 NOTE — PROGRESS NOTES
Please call patient with the following message    Lab testing was all in normal range. No account for what is causing symptoms. Let him know we could proceed with EMG (muscle testing) or referral to neurology for further evaluation. Route back to me for orders.     Sandor Moralez PA-C

## 2017-07-20 ENCOUNTER — TELEPHONE (OUTPATIENT)
Dept: FAMILY MEDICINE | Facility: OTHER | Age: 27
End: 2017-07-20

## 2017-07-20 DIAGNOSIS — M62.81 MUSCLE WEAKNESS (GENERALIZED): Primary | ICD-10-CM

## 2017-07-20 NOTE — TELEPHONE ENCOUNTER
Reason for Call: Request for an order or referral:    Order or referral being requested: referral    Date needed: as soon as possible    Has the patient been seen by the PCP for this problem? YES    Additional comments: patient would like to get a referral for Neurology and EMG    Phone number Patient can be reached at:  Home number on file [phone] (home) or Cell number on file:    No relevant phone numbers on file.       Best Time:  anytime    Can we leave a detailed message on this number?  YES    Call taken on 7/20/2017 at 1:18 PM by Dulce Sarmiento

## 2017-07-20 NOTE — TELEPHONE ENCOUNTER
"Referral/EMG pended. Last OV 07/11/2017.  Christina Teran, CMA      Per result note: \"Lab testing was all in normal range. No account for what is causing symptoms. Let him know we could proceed with EMG (muscle testing) or referral to neurology for further evaluation. Route back to me for orders.\"  "

## 2017-07-20 NOTE — TELEPHONE ENCOUNTER
Referral for neurology placed, they will then order EMG       [phone]   [email or web address]    Sandor Hussein-JUAN Eng  [facility]

## 2018-02-08 NOTE — PROGRESS NOTES
"  SUBJECTIVE:   Shlomo Nelson is a 27 year old male who presents to clinic today for the following health issues:    * UA and/or G/C first * -CDL     HPI  Genitourinary - Male  Onset: 2 weeks    Description:   Dysuria (painful urination): YES, a bit   Hematuria (blood in urine): no   Frequency: YES  Are you urinating at night : YES  Hesitancy (delay in urine): no   Retention (unable to empty): YES  Decrease in urinary flow: no   Incontinence: no     Progression of Symptoms:  same    Accompanying Signs & Symptoms:  Fever: no   Back/Flank pain: YES- upper back worsening  Urethral discharge: no   Testicle lumps/masses/pain: groin/leg pain  Nausea and/or vomiting: Yes  Abdominal pain: YES- last night    History:   History of frequent UTI's: no   History of kidney stones: no   History of hernias: no   Personal or Family history of Prostate problems: grandpa had prostate cancer, diabetes runs in the family   Sexually active: YES    Precipitating factors:   none    Alleviating factors:  none    - Nausea, vomiting, and diarrhea last night ~ 12 hours, getting better   - Definitely still nauseated   - Suprapubic abdominal pain, sometimes goes into scrotum   - Before last night, bowel movements were normal for him \"irregular\"   - No problems with erections   - Genitals aren't sore, just radiating pain from bladder   - Had smoothie around ~8:30           Problem list and histories reviewed & adjusted, as indicated.  Additional history: as documented      Labs reviewed in EPIC    ROS:  Constitutional, HEENT, cardiovascular, pulmonary, gi and gu systems are negative, except as otherwise noted.    OBJECTIVE:   /66 (BP Location: Left arm, Patient Position: Chair, Cuff Size: Adult Regular)  Pulse 100  Temp 97.8  F (36.6  C) (Oral)  Resp 16  Ht 5' 7.72\" (1.72 m)  Wt 183 lb (83 kg)  SpO2 98%  BMI 28.06 kg/m2  Body mass index is 28.06 kg/(m^2).  GENERAL APPEARANCE: healthy, alert and no distress  EYES: Eyes grossly normal " to inspection, PERRLA, conjunctivae and sclerae without injection or discharge, EOM intact   ABDOMEN: Soft, nontender, no hepatosplenomegaly, no masses and bowel sounds normal    (MALE): Normal male genitalia, no hernias palpated in inguinal areas, no discharge in penile meatus, bilateral testicles non-tender and without masses   Rectal exam: prostate normal in size without masses or nodules but moderately tender to palpation   MS: No musculoskeletal defects are noted and gait is age appropriate without ataxia   SKIN: No suspicious lesions or rashes, hydration status appears adeuqate with normal skin turgor   PSYCH: Alert and oriented x3; speech- coherent , normal rate and volume; able to articulate logical thoughts, able to abstract reason, no tangential thoughts, no hallucinations or delusions, mentation appears normal, Mood is euthymic. Affect is appropriate for this mood state and bright. Thought content is free of suicidal ideation, hallucinations, and delusions. Dress is adequate and upkept. Eye contact is good during conversation.       Diagnostic Test Results:  Results for orders placed or performed in visit on 02/09/18 (from the past 24 hour(s))   *UA reflex to Microscopic   Result Value Ref Range    Color Urine Yellow     Appearance Urine Clear     Glucose Urine Negative NEG^Negative mg/dL    Bilirubin Urine Negative NEG^Negative    Ketones Urine Negative NEG^Negative mg/dL    Specific Gravity Urine 1.025 1.003 - 1.035    Blood Urine Negative NEG^Negative    pH Urine 5.5 5.0 - 7.0 pH    Protein Albumin Urine Negative NEG^Negative mg/dL    Urobilinogen Urine 0.2 0.2 - 1.0 EU/dL    Nitrite Urine Negative NEG^Negative    Leukocyte Esterase Urine Negative NEG^Negative    Source Midstream Urine    CBC with platelets   Result Value Ref Range    WBC 11.2 (H) 4.0 - 11.0 10e9/L    RBC Count 4.94 4.4 - 5.9 10e12/L    Hemoglobin 14.9 13.3 - 17.7 g/dL    Hematocrit 42.2 40.0 - 53.0 %    MCV 85 78 - 100 fl    MCH  30.2 26.5 - 33.0 pg    MCHC 35.3 31.5 - 36.5 g/dL    RDW 13.5 10.0 - 15.0 %    Platelet Count 133 (L) 150 - 450 10e9/L     XR abd: Preliminary, no acute changes, no air fluid levels, normal bowel gas pattern, await final from radiology           ASSESSMENT/PLAN:       ICD-10-CM    1. Acute prostatitis N41.0 ciprofloxacin (CIPRO) 500 MG tablet   2. Urinary frequency R35.0 *UA reflex to Microscopic     CBC with platelets     PSA, screen     Comprehensive metabolic panel     XR Abdomen 2 Views     CANCELED: Glucose     - Question prostate etiology vs. UTI/bladder vs. Diabetes vs. Hernia vs. GI etiology vs Other   - No sign of acute or surgical abdomen  - Recommend labs and x-ray of abdomen   - Exam suggestive of prostatitis, essentially normal with exception of moderate prostate tenderness, no signs of hernia   - Labs as above, no sign of UTI, very mild elevation of WBC suggestive of infection, rest of labs we will not have back until Monday, abdominal x-ray normal, no signs of constipation   - Patient will await call on this   - Discussed with patient likely diagnosis of prostatitis   - Will start empiric therapy for this while we await the rest of labs   - Discussed Cipro 500 mg BID for 10 days  - Discussed antibiotic use, duration, and side effects  - Hand out on prostatitis given   - If labs normal, patient to continue antibiotic  - If symptoms don't improve after 5-7 days, return to clinic and will consider repeat labs along with CT abd/pelvis and/or referral to urology     The patient indicates understanding of these issues and agrees with the plan.    Follow up: as outlined above, PRN pending labs         Xiomara Moralez PA-C  [facility]

## 2018-02-09 ENCOUNTER — RADIANT APPOINTMENT (OUTPATIENT)
Dept: GENERAL RADIOLOGY | Facility: OTHER | Age: 28
End: 2018-02-09
Attending: PHYSICIAN ASSISTANT
Payer: COMMERCIAL

## 2018-02-09 ENCOUNTER — OFFICE VISIT (OUTPATIENT)
Dept: FAMILY MEDICINE | Facility: OTHER | Age: 28
End: 2018-02-09
Payer: COMMERCIAL

## 2018-02-09 VITALS
RESPIRATION RATE: 16 BRPM | DIASTOLIC BLOOD PRESSURE: 66 MMHG | WEIGHT: 183 LBS | HEART RATE: 100 BPM | BODY MASS INDEX: 27.74 KG/M2 | HEIGHT: 68 IN | SYSTOLIC BLOOD PRESSURE: 104 MMHG | TEMPERATURE: 97.8 F | OXYGEN SATURATION: 98 %

## 2018-02-09 DIAGNOSIS — R35.0 URINARY FREQUENCY: ICD-10-CM

## 2018-02-09 DIAGNOSIS — N41.0 ACUTE PROSTATITIS: Primary | ICD-10-CM

## 2018-02-09 LAB
ALBUMIN SERPL-MCNC: 4.6 G/DL (ref 3.4–5)
ALBUMIN UR-MCNC: NEGATIVE MG/DL
ALP SERPL-CCNC: 65 U/L (ref 40–150)
ALT SERPL W P-5'-P-CCNC: 37 U/L (ref 0–70)
ANION GAP SERPL CALCULATED.3IONS-SCNC: 8 MMOL/L (ref 3–14)
APPEARANCE UR: CLEAR
AST SERPL W P-5'-P-CCNC: 13 U/L (ref 0–45)
BILIRUB SERPL-MCNC: 0.9 MG/DL (ref 0.2–1.3)
BILIRUB UR QL STRIP: NEGATIVE
BUN SERPL-MCNC: 22 MG/DL (ref 7–30)
CALCIUM SERPL-MCNC: 9 MG/DL (ref 8.5–10.1)
CHLORIDE SERPL-SCNC: 104 MMOL/L (ref 94–109)
CO2 SERPL-SCNC: 27 MMOL/L (ref 20–32)
COLOR UR AUTO: YELLOW
CREAT SERPL-MCNC: 0.95 MG/DL (ref 0.66–1.25)
ERYTHROCYTE [DISTWIDTH] IN BLOOD BY AUTOMATED COUNT: 13.5 % (ref 10–15)
GFR SERPL CREATININE-BSD FRML MDRD: >90 ML/MIN/1.7M2
GLUCOSE SERPL-MCNC: 97 MG/DL (ref 70–99)
GLUCOSE UR STRIP-MCNC: NEGATIVE MG/DL
HCT VFR BLD AUTO: 42.2 % (ref 40–53)
HGB BLD-MCNC: 14.9 G/DL (ref 13.3–17.7)
HGB UR QL STRIP: NEGATIVE
KETONES UR STRIP-MCNC: NEGATIVE MG/DL
LEUKOCYTE ESTERASE UR QL STRIP: NEGATIVE
MCH RBC QN AUTO: 30.2 PG (ref 26.5–33)
MCHC RBC AUTO-ENTMCNC: 35.3 G/DL (ref 31.5–36.5)
MCV RBC AUTO: 85 FL (ref 78–100)
NITRATE UR QL: NEGATIVE
PH UR STRIP: 5.5 PH (ref 5–7)
PLATELET # BLD AUTO: 133 10E9/L (ref 150–450)
POTASSIUM SERPL-SCNC: 4.4 MMOL/L (ref 3.4–5.3)
PROT SERPL-MCNC: 7.8 G/DL (ref 6.8–8.8)
PSA SERPL-ACNC: 0.33 UG/L (ref 0–4)
RBC # BLD AUTO: 4.94 10E12/L (ref 4.4–5.9)
SODIUM SERPL-SCNC: 139 MMOL/L (ref 133–144)
SOURCE: NORMAL
SP GR UR STRIP: 1.02 (ref 1–1.03)
UROBILINOGEN UR STRIP-ACNC: 0.2 EU/DL (ref 0.2–1)
WBC # BLD AUTO: 11.2 10E9/L (ref 4–11)

## 2018-02-09 PROCEDURE — 74019 RADEX ABDOMEN 2 VIEWS: CPT

## 2018-02-09 PROCEDURE — 85027 COMPLETE CBC AUTOMATED: CPT | Performed by: PHYSICIAN ASSISTANT

## 2018-02-09 PROCEDURE — 80053 COMPREHEN METABOLIC PANEL: CPT | Performed by: PHYSICIAN ASSISTANT

## 2018-02-09 PROCEDURE — 81003 URINALYSIS AUTO W/O SCOPE: CPT | Performed by: PHYSICIAN ASSISTANT

## 2018-02-09 PROCEDURE — 36415 COLL VENOUS BLD VENIPUNCTURE: CPT | Performed by: PHYSICIAN ASSISTANT

## 2018-02-09 PROCEDURE — 99214 OFFICE O/P EST MOD 30 MIN: CPT | Performed by: PHYSICIAN ASSISTANT

## 2018-02-09 PROCEDURE — G0103 PSA SCREENING: HCPCS | Performed by: PHYSICIAN ASSISTANT

## 2018-02-09 RX ORDER — CIPROFLOXACIN 500 MG/1
500 TABLET, FILM COATED ORAL 2 TIMES DAILY
Qty: 20 TABLET | Refills: 0 | Status: SHIPPED | OUTPATIENT
Start: 2018-02-09 | End: 2018-02-20

## 2018-02-09 ASSESSMENT — PAIN SCALES - GENERAL: PAINLEVEL: NO PAIN (0)

## 2018-02-09 NOTE — NURSING NOTE
"Chief Complaint   Patient presents with     Urinary Problem     Panel Management     height, flu       Initial /66 (BP Location: Left arm, Patient Position: Chair, Cuff Size: Adult Regular)  Pulse 100  Temp 97.8  F (36.6  C) (Oral)  Resp 16  Ht 5' 7.72\" (1.72 m)  Wt 183 lb (83 kg)  SpO2 98%  BMI 28.06 kg/m2 Estimated body mass index is 28.06 kg/(m^2) as calculated from the following:    Height as of this encounter: 5' 7.72\" (1.72 m).    Weight as of this encounter: 183 lb (83 kg).  Medication Reconciliation: complete  "

## 2018-02-09 NOTE — PATIENT INSTRUCTIONS
- Ciprofloxacin - twice a day for 10 days   - Return to clinic if symptoms don't improve   - Await rest of labs                        Prostatitis         What is prostatitis?   Prostatitis is swelling and tenderness of a man's prostate gland. The prostate gland is part of a man's reproductive system. It is, on average, a little bigger than a walnut. It is located between the base of the bladder and the beginning of the penis. It surrounds the upper part of the urethra. (The urethra carries urine from the bladder out through the penis.) The prostate makes the fluid that nourishes and carries sperm.   There are 3 types of prostatitis:   acute bacterial prostatitis   chronic prostatitis   nonbacterial prostatitis.   How does it occur?   Prostatitis is a common problem. Sometimes it is caused by a bacterial infection.   Usually the bacteria come from other infected parts of the urinary tract, such as the bladder or kidneys.   The bacteria can spread to the prostate through the urethra after sex with someone who is infected.   The bacteria can also spread to the prostate through the bloodstream.   More often the prostate becomes swollen and tender (inflamed) without infection. It may be a chronic, ongoing problem. Often the cause is not known.   What are the symptoms?   The symptoms of the different types of prostatitis vary somewhat. When a bacterial infection is causing prostatitis, you have clear signs of illness and you need to get treatment promptly. The symptoms most often are:   fever   chills   sweats   lower back pain or groin.   You may have:   pain behind the scrotum   pain when you urinate   frequent and urgent urination   pain when you have a bowel movement.   It may be hard for you to pass urine.   The symptoms of chronic prostatitis and nonbacterial prostatitis are more subtle. They develop more slowly and are milder. Possible symptoms are:   slight discomfort when you urinate, often felt at the base of  the penis   mild lower backache   aches in the penis, scrotum, or middle to lower abdomen   pain during or after the release of semen (ejaculation) during sex   small amounts of blood in the semen.   How is it diagnosed?   Your healthcare provider will ask about your symptoms. Your provider will examine your abdomen and scrotum to check for other possible problems. Tests you may have are:   a rectal exam   tests of a sample of urine   tests of fluid from the prostate.   Your healthcare provider may ask you to give a urine sample before a rectal exam of the prostate. During the rectal exam, your provider will press on the prostate. This will cause fluid from the prostate to enter your urinary tract. After the rectal exam you will provide another sample of urine. This will help your provider know if the prostate or the urinary tract is infected.   Your provider may conclude that you have nonbacterial prostatitis if you have symptoms but neither the urine culture nor the prostate fluid show evidence of infection.   How is it treated?   A bacterial infection is treated with antibiotic medicine. For severe symptoms, you may need to spend some time in the hospital for intravenous (IV) antibiotics.   Sometimes an antibiotic or hot baths can help treat nonbacterial prostatitis.   You may find that some foods, such as spicy foods or foods that contain caffeine, seem to cause prostate symptoms. Ask your healthcare provider if it might help to avoid these foods.   How long will the effects last?   The symptoms of an infection usually get better with antibiotics in a few days. Sometimes the symptoms last 1 to 2 weeks after you start taking the medicine.   Symptoms may come back if not all of the bacteria in the prostate gland are killed by the antibiotic. Symptoms may also come back if bacteria from the urinary tract or from sexual contact reinfect the prostate. If this happens, you may need to take antibiotics again.   After  an infection is treated you may have X-rays or ultrasound scans of your urinary tract. These images of your pelvic area help rule out causes or complications of prostatitis, such as kidney infection or an abscess of the prostate gland.   The symptoms of chronic prostatitis can recur or last for months to years.   How can I help prevent prostatitis?   Avoid sexually transmitted infections to reduce the risk of prostatitis. For example, use latex or polyurethane condoms during sex. Have just 1 sexual partner who is not sexually active with anyone else.   Practice good hygiene. If you are a man who has not been circumcised, good hygiene includes gently pulling back the foreskin to wash the tip of the penis every time you bathe or shower. This helps to prevent urinary tract infections, which can lead to prostatitis.   Get prompt treatment of any urinary tract problems. This reduces the chance of prostate infection.     Published by [employer].  This content is reviewed periodically and is subject to change as new health information becomes available. The information is intended to inform and educate and is not a replacement for medical evaluation, advice, diagnosis or treatment by a healthcare professional.   Developed by [employer].   ? 2010 [employer] and/or its affiliates. All Rights Reserved.

## 2018-02-09 NOTE — MR AVS SNAPSHOT
After Visit Summary   2/9/2018    Shlomo Nelson    MRN: [id number]           Patient Information     Date Of Birth          1990        Visit Information        Provider Department      2/9/2018 10:00 AM Xiomara Moralez PA-C [facility]        Today's Diagnoses     Acute prostatitis    -  1    Urinary frequency          Care Instructions    - Ciprofloxacin - twice a day for 10 days   - Return to clinic if symptoms don't improve   - Await rest of labs                        Prostatitis         What is prostatitis?   Prostatitis is swelling and tenderness of a man's prostate gland. The prostate gland is part of a man's reproductive system. It is, on average, a little bigger than a walnut. It is located between the base of the bladder and the beginning of the penis. It surrounds the upper part of the urethra. (The urethra carries urine from the bladder out through the penis.) The prostate makes the fluid that nourishes and carries sperm.   There are 3 types of prostatitis:   acute bacterial prostatitis   chronic prostatitis   nonbacterial prostatitis.   How does it occur?   Prostatitis is a common problem. Sometimes it is caused by a bacterial infection.   Usually the bacteria come from other infected parts of the urinary tract, such as the bladder or kidneys.   The bacteria can spread to the prostate through the urethra after sex with someone who is infected.   The bacteria can also spread to the prostate through the bloodstream.   More often the prostate becomes swollen and tender (inflamed) without infection. It may be a chronic, ongoing problem. Often the cause is not known.   What are the symptoms?   The symptoms of the different types of prostatitis vary somewhat. When a bacterial infection is causing prostatitis, you have clear signs of illness and you need to get treatment promptly. The symptoms most often are:   fever   chills   sweats   lower back pain or  groin.   You may have:   pain behind the scrotum   pain when you urinate   frequent and urgent urination   pain when you have a bowel movement.   It may be hard for you to pass urine.   The symptoms of chronic prostatitis and nonbacterial prostatitis are more subtle. They develop more slowly and are milder. Possible symptoms are:   slight discomfort when you urinate, often felt at the base of the penis   mild lower backache   aches in the penis, scrotum, or middle to lower abdomen   pain during or after the release of semen (ejaculation) during sex   small amounts of blood in the semen.   How is it diagnosed?   Your healthcare provider will ask about your symptoms. Your provider will examine your abdomen and scrotum to check for other possible problems. Tests you may have are:   a rectal exam   tests of a sample of urine   tests of fluid from the prostate.   Your healthcare provider may ask you to give a urine sample before a rectal exam of the prostate. During the rectal exam, your provider will press on the prostate. This will cause fluid from the prostate to enter your urinary tract. After the rectal exam you will provide another sample of urine. This will help your provider know if the prostate or the urinary tract is infected.   Your provider may conclude that you have nonbacterial prostatitis if you have symptoms but neither the urine culture nor the prostate fluid show evidence of infection.   How is it treated?   A bacterial infection is treated with antibiotic medicine. For severe symptoms, you may need to spend some time in the hospital for intravenous (IV) antibiotics.   Sometimes an antibiotic or hot baths can help treat nonbacterial prostatitis.   You may find that some foods, such as spicy foods or foods that contain caffeine, seem to cause prostate symptoms. Ask your healthcare provider if it might help to avoid these foods.   How long will the effects last?   The symptoms of an infection usually get  better with antibiotics in a few days. Sometimes the symptoms last 1 to 2 weeks after you start taking the medicine.   Symptoms may come back if not all of the bacteria in the prostate gland are killed by the antibiotic. Symptoms may also come back if bacteria from the urinary tract or from sexual contact reinfect the prostate. If this happens, you may need to take antibiotics again.   After an infection is treated you may have X-rays or ultrasound scans of your urinary tract. These images of your pelvic area help rule out causes or complications of prostatitis, such as kidney infection or an abscess of the prostate gland.   The symptoms of chronic prostatitis can recur or last for months to years.   How can I help prevent prostatitis?   Avoid sexually transmitted infections to reduce the risk of prostatitis. For example, use latex or polyurethane condoms during sex. Have just 1 sexual partner who is not sexually active with anyone else.   Practice good hygiene. If you are a man who has not been circumcised, good hygiene includes gently pulling back the foreskin to wash the tip of the penis every time you bathe or shower. This helps to prevent urinary tract infections, which can lead to prostatitis.   Get prompt treatment of any urinary tract problems. This reduces the chance of prostate infection.     Published by [employer].  This content is reviewed periodically and is subject to change as new health information becomes available. The information is intended to inform and educate and is not a replacement for medical evaluation, advice, diagnosis or treatment by a healthcare professional.   Developed by [employer].   ? 2010 [facility] and/or its affiliates. All Rights Reserved.                       Follow-ups after your visit        Who to contact     If you have questions or need follow up information about today's clinic visit or your schedule please contact [facility] directly at  "[phone].  Normal or non-critical lab and imaging results will be communicated to you by [employer], letter or phone within 4 business days after the clinic has received the results. If you do not hear from us within 7 days, please contact the clinic through [employer] or phone. If you have a critical or abnormal lab result, we will notify you by phone as soon as possible.  Submit refill requests through [employer] or call your pharmacy and they will forward the refill request to us. Please allow 3 business days for your refill to be completed.          Additional Information About Your Visit        [employer] Information     [employer] lets you send messages to your doctor, view your test results, renew your prescriptions, schedule appointments and more. To sign up, go to [email or web address] . Click on \"Log in\" on the left side of the screen, which will take you to the Welcome page. Then click on \"Sign up Now\" on the right side of the page.     You will be asked to enter the access code listed below, as well as some personal information. Please follow the directions to create your username and password.     Your access code is: [email or web address]  Expires: 5/10/2018 11:02 AM     Your access code will  in 90 days. If you need help or a new code, please call your [place] clinic or [phone].        Care EveryWhere ID     This is your Care EveryWhere ID. This could be used by other organizations to access your [place] medical records  [id number]        Your Vitals Were     Pulse Temperature Respirations Height Pulse Oximetry BMI (Body Mass Index)    100 97.8  F (36.6  C) (Oral) 16 5' 7.72\" (1.72 m) 98% 28.06 kg/m2       Blood Pressure from Last 3 Encounters:   18 104/66   17 128/82   17 112/76    Weight from Last 3 Encounters:   18 183 lb (83 kg)   17 191 lb 12.8 oz (87 kg)   17 194 lb (88 kg)              We Performed the Following     *UA reflex to Microscopic     CBC with " platelets     Comprehensive metabolic panel     PSA, screen          Today's Medication Changes          These changes are accurate as of 2/9/18 11:02 AM.  If you have any questions, ask your nurse or doctor.               Start taking these medicines.        Dose/Directions    ciprofloxacin 500 MG tablet   Commonly known as:  CIPRO   Used for:  Acute prostatitis   Started by:  Xiomara Moralez PA-C        Dose:  500 mg   Take 1 tablet (500 mg) by mouth 2 times daily   Quantity:  20 tablet   Refills:  0            Where to get your medicines      These medications were sent to [facility] [address] [place] [id number] [facility] [address]  [address] [facility], [facility] [address]     Phone:  [phone]     ciprofloxacin 500 MG tablet                Primary Care Provider Office Phone # Fax #    Xiomara Moralez PA-C [phone]       [address] [facility] [address]  [facility] [address]        Equal Access to Services     JOHNIE SMITH AH: Hadii aad ku hadasho Soomaali, waaxda luqadaha, qaybta kaalmada adeegyada, waxay idiin haytimin marley last . So [facility] [phone].    ATENCIÓN: Si habla español, tiene a grissom disposición servicios gratuitos de asistencia lingüística. Mallory al [phone].    We comply with applicable federal civil rights laws and [place] laws. We do not discriminate on the basis of race, color, national origin, age, disability, sex, sexual orientation, or gender identity.            Thank you!     Thank you for choosing [facility]  for your care. Our goal is always to provide you with excellent care. Hearing back from our patients is one way we can continue to improve our services. Please take a few minutes to complete the written survey that you may receive in the mail after your visit with us. Thank you!             Your Updated Medication List - Protect others around you: Learn how to safely use, store and  throw away your medicines at www.disposemymeds.org.          This list is accurate as of 2/9/18 11:02 AM.  Always use your most recent med list.                   Brand Name Dispense Instructions for use Diagnosis    ciprofloxacin 500 MG tablet    CIPRO    20 tablet    Take 1 tablet (500 mg) by mouth 2 times daily    Acute prostatitis       HYDROcodone-acetaminophen 5-325 MG per tablet    NORCO     TK 1-2 TS PO Q 4 H PRN P  NM4        MULTIVITAMIN ADULT PO

## 2018-02-12 NOTE — PROGRESS NOTES
Please call patient with the following message    Labs all normal. Please get update on his symptoms and route back to provider. Hope he is feeling better.     Sandor Moralez PA-C

## 2018-02-19 NOTE — PROGRESS NOTES
"  SUBJECTIVE:   Shlomo Nelson is a 27 year old male who presents to clinic today for the following health issues:      HPI    Genitourinary - Male Follow up  Onset: 28th of January. The last 3 days may be worse then before.     Description:   Dysuria (painful urination): YES- little  Hematuria (blood in urine): no   Frequency: YES- little more than normal  Are you urinating at night : YES  Hesitancy (delay in urine): no   Retention (unable to empty): YES  Decrease in urinary flow: no   Incontinence: no     Progression of Symptoms:  Same, the last 3 days more consistent abdomen pressure.     Accompanying Signs & Symptoms:  Fever: no   Back/Flank pain: no   Urethral discharge: no   Testicle lumps/masses/pain: no   Nausea and/or vomiting: no   Abdominal pain: YES- pressure not pain just dull    History:   History of frequent UTI's: no   History of kidney stones: no   History of hernias: no   Personal or Family history of Prostate problems: YES- grandfather   Sexually active: YES with wife of 4 years    Precipitating factors:   none    Alleviating factors:  Doesn't feel like the medication prescribed helped.     Symptoms which started last ~1/29 and for which he was seen here on 2/9  Only minimal improvement after starting Cipro here  Symptoms have since persisted and actually worsened slightly  Still having lower abdominal pressure and frequent urge to urinate with discomfort radiating from RLQ to tip of penis, but only urinating 6x/day (reportedly typical for him)  Urine is clear and colorless without blood  He does note that he went 3-4 days without a BM until yesterday (Waller Type 4)  However, no other symptoms and he specifically denies fevers, chills, nausea, vomiting, diarrhea, black/bloody stools, back pain, flank pain, penile discharge/sores  Family history of kidney stones in 26 y.o. brother and \"multitude\" of cancers in grandfather (unable to clarify)      Plan from last visit on 2/9/18  - Question prostate " etiology vs. UTI/bladder vs. Diabetes vs. Hernia vs. GI etiology vs Other   - No sign of acute or surgical abdomen  - Recommend labs and x-ray of abdomen   - Exam suggestive of prostatitis, essentially normal with exception of moderate prostate tenderness, no signs of hernia   - Labs as above, no sign of UTI, very mild elevation of WBC suggestive of infection, rest of labs we will not have back until Monday, abdominal x-ray normal, no signs of constipation   - Patient will await call on this   - Discussed with patient likely diagnosis of prostatitis   - Will start empiric therapy for this while we await the rest of labs   - Discussed Cipro 500 mg BID for 10 days  - Discussed antibiotic use, duration, and side effects  - Hand out on prostatitis given   - If labs normal, patient to continue antibiotic  - If symptoms don't improve after 5-7 days, return to clinic and will consider repeat labs along with CT abd/pelvis and/or referral to urology         Problem list and histories reviewed & adjusted, as indicated.  Additional history: as documented    Labs reviewed in EPIC    ROS:  Constitutional, HEENT, cardiovascular, pulmonary, gi and gu systems are negative, except as otherwise noted.    OBJECTIVE:   /78  Pulse 86  Temp 97.9  F (36.6  C) (Oral)  Resp 16  Wt 186 lb 6.4 oz (84.6 kg)  SpO2 100%  BMI 28.58 kg/m2  Body mass index is 28.58 kg/(m^2).  GENERAL APPEARANCE: healthy, alert and no distress  EYES: Eyes grossly normal to inspection, PERRLA, conjunctivae and sclerae without injection or discharge, EOM intact   ABDOMEN: Normal bowel sounds. Normal percussion with tympani and intermittent dullness. Soft, mild RLQ tenderness with palpation, no rebound tenderness or peritoneal signs, no hepatosplenomegaly, no masses.   (MALE): See 2/9 exam  Rectal Exam: See 2/9 exam  MS: No musculoskeletal defects are noted and gait is age appropriate without ataxia   SKIN: No suspicious lesions or rashes, hydration  status appears adeuqate with normal skin turgor   PSYCH: Alert and oriented x3; speech- coherent , normal rate and volume; able to articulate logical thoughts, able to abstract reason, no tangential thoughts, no hallucinations or delusions, mentation appears normal, Mood is euthymic. Affect is appropriate for this mood state and bright. Thought content is free of suicidal ideation, hallucinations, and delusions. Dress is adequate and upkept. Eye contact is good during conversation.       Diagnostic Test Results:  Results for orders placed or performed in visit on 02/20/18 (from the past 24 hour(s))   *UA reflex to Microscopic and Culture ([facility] (except [place] and [place])   Result Value Ref Range    Color Urine Yellow     Appearance Urine Clear     Glucose Urine Negative NEG^Negative mg/dL    Bilirubin Urine Negative NEG^Negative    Ketones Urine Negative NEG^Negative mg/dL    Specific Gravity Urine 1.015 1.003 - 1.035    Blood Urine Negative NEG^Negative    pH Urine 7.0 5.0 - 7.0 pH    Protein Albumin Urine Negative NEG^Negative mg/dL    Urobilinogen Urine 0.2 0.2 - 1.0 EU/dL    Nitrite Urine Negative NEG^Negative    Leukocyte Esterase Urine Negative NEG^Negative    Source Unspecified Urine        ASSESSMENT/PLAN:       ICD-10-CM    1. Dysuria R30.0 *UA reflex to Microscopic and Culture ([facility] (except [place] and [place])     Neisseria gonorrhoeae PCR     Chlamydia trachomatis PCR     CT Abdomen Pelvis w/o & w Contrast     CBC with platelets     UROLOGY ADULT REFERRAL     HIV Antigen Antibody Combo     Anti Treponema     Hepatitis C antibody   2. Suprapubic abdominal pain R10.2 *UA reflex to Microscopic and Culture ([facility] (except [place] and [place])     Neisseria gonorrhoeae PCR     Chlamydia trachomatis PCR     CT Abdomen Pelvis w/o & w Contrast     CBC with platelets     UROLOGY ADULT REFERRAL     HIV Antigen Antibody Combo     Anti  Treponema     Hepatitis C antibody   3. Urinary retention R33.9      Dysuria, Urinary Retention, Suprapubic Abdominal Pain  - Patient with 3 weeks of mild intermittent dysuria, mild suprapubic discomfort, and frequent urges to urinate without actual increase in urine output; UA on 2/9 was normal but WBC 11.2, so patient treated with Cipro; UA today still normal  - No signs of acute or surgical abdomen   - DDx: BPH, bladder cancer, chronic prostatitis, chlamydia/STIs, overactive bladder, GI? Seems unlikely but patient does have irregular bowel movements, some RLQ pain, question chronic appendicitis?    - Will draw additional labs today (Canelo/Chlam, CBC, HIV, syphilis, hep C)  - CT Abd/Pelvis with/without contrast tomorrow to evaluate bladder, prostate, kidneys, and bowel   - If workup does not reveal source for patients symptoms, referral for Urology appointment on 3/7/2018     Will also consider starting medication for chronic prostatitis (longer duration) vs. Flomax for BPH (though seems unlikely due to patient's age)   - Return for worsening symptoms  - Discussed warning signs that would warrant return to clinic/ED      The patient indicates understanding of these issues and agrees with the plan.    Follow up: pending labs and imaging     Patient was seen and examined additionally by PA student from Rohith Bowman PA-S.       Xiomara Moralez PA-C  [facility]

## 2018-02-20 ENCOUNTER — OFFICE VISIT (OUTPATIENT)
Dept: FAMILY MEDICINE | Facility: OTHER | Age: 28
End: 2018-02-20
Payer: COMMERCIAL

## 2018-02-20 VITALS
SYSTOLIC BLOOD PRESSURE: 128 MMHG | WEIGHT: 186.4 LBS | RESPIRATION RATE: 16 BRPM | BODY MASS INDEX: 28.58 KG/M2 | HEART RATE: 86 BPM | DIASTOLIC BLOOD PRESSURE: 78 MMHG | TEMPERATURE: 97.9 F | OXYGEN SATURATION: 100 %

## 2018-02-20 DIAGNOSIS — R10.2 SUPRAPUBIC ABDOMINAL PAIN: ICD-10-CM

## 2018-02-20 DIAGNOSIS — R33.9 URINARY RETENTION: ICD-10-CM

## 2018-02-20 DIAGNOSIS — R30.0 DYSURIA: Primary | ICD-10-CM

## 2018-02-20 LAB
ALBUMIN UR-MCNC: NEGATIVE MG/DL
APPEARANCE UR: CLEAR
BILIRUB UR QL STRIP: NEGATIVE
COLOR UR AUTO: YELLOW
ERYTHROCYTE [DISTWIDTH] IN BLOOD BY AUTOMATED COUNT: 13.3 % (ref 10–15)
GLUCOSE UR STRIP-MCNC: NEGATIVE MG/DL
HCT VFR BLD AUTO: 41.4 % (ref 40–53)
HGB BLD-MCNC: 14.6 G/DL (ref 13.3–17.7)
HGB UR QL STRIP: NEGATIVE
KETONES UR STRIP-MCNC: NEGATIVE MG/DL
LEUKOCYTE ESTERASE UR QL STRIP: NEGATIVE
MCH RBC QN AUTO: 30.1 PG (ref 26.5–33)
MCHC RBC AUTO-ENTMCNC: 35.3 G/DL (ref 31.5–36.5)
MCV RBC AUTO: 85 FL (ref 78–100)
NITRATE UR QL: NEGATIVE
PH UR STRIP: 7 PH (ref 5–7)
PLATELET # BLD AUTO: 121 10E9/L (ref 150–450)
RBC # BLD AUTO: 4.85 10E12/L (ref 4.4–5.9)
SOURCE: NORMAL
SP GR UR STRIP: 1.01 (ref 1–1.03)
UROBILINOGEN UR STRIP-ACNC: 0.2 EU/DL (ref 0.2–1)
WBC # BLD AUTO: 8.6 10E9/L (ref 4–11)

## 2018-02-20 PROCEDURE — 86780 TREPONEMA PALLIDUM: CPT | Performed by: PHYSICIAN ASSISTANT

## 2018-02-20 PROCEDURE — 99214 OFFICE O/P EST MOD 30 MIN: CPT | Performed by: PHYSICIAN ASSISTANT

## 2018-02-20 PROCEDURE — 87389 HIV-1 AG W/HIV-1&-2 AB AG IA: CPT | Performed by: PHYSICIAN ASSISTANT

## 2018-02-20 PROCEDURE — 87591 N.GONORRHOEAE DNA AMP PROB: CPT | Performed by: PHYSICIAN ASSISTANT

## 2018-02-20 PROCEDURE — 81003 URINALYSIS AUTO W/O SCOPE: CPT | Performed by: PHYSICIAN ASSISTANT

## 2018-02-20 PROCEDURE — 85027 COMPLETE CBC AUTOMATED: CPT | Performed by: PHYSICIAN ASSISTANT

## 2018-02-20 PROCEDURE — 36415 COLL VENOUS BLD VENIPUNCTURE: CPT | Performed by: PHYSICIAN ASSISTANT

## 2018-02-20 PROCEDURE — 86803 HEPATITIS C AB TEST: CPT | Performed by: PHYSICIAN ASSISTANT

## 2018-02-20 PROCEDURE — 87491 CHLMYD TRACH DNA AMP PROBE: CPT | Performed by: PHYSICIAN ASSISTANT

## 2018-02-20 RX ORDER — TAMSULOSIN HYDROCHLORIDE 0.4 MG/1
0.4 CAPSULE ORAL DAILY
Qty: 30 CAPSULE | Refills: 1 | Status: CANCELLED | OUTPATIENT
Start: 2018-02-20

## 2018-02-20 ASSESSMENT — PAIN SCALES - GENERAL: PAINLEVEL: NO PAIN (0)

## 2018-02-20 NOTE — MR AVS SNAPSHOT
After Visit Summary   2/20/2018    Shlomo Nelson    MRN: [id number]           Patient Information     Date Of Birth          1990        Visit Information        Provider Department      2/20/2018 2:30 PM Xiomara Moralez PA-C [facility]        Today's Diagnoses     Dysuria    -  1    Suprapubic abdominal pain        Urinary retention          Care Instructions    - Labs today  - CT scan tomorrow     - Await results           Follow-ups after your visit        Additional Services     UROLOGY ADULT REFERRAL       Your provider has referred you to: PREFERRED PROVIDERS:  FMG: [facility] [phone]   [email or web address]    Please be aware that coverage of these services is subject to the terms and limitations of your health insurance plan.  Call member services at your health plan with any benefit or coverage questions.      Please bring the following with you to your appointment:    (1) Any X-Rays, CTs or MRIs which have been performed.  Contact the facility where they were done to arrange for  prior to your scheduled appointment.    (2) List of current medications  (3) This referral request   (4) Any documents/labs given to you for this referral                  Your next 10 appointments already scheduled     Feb 21, 2018  2:00 PM CST   (Arrive by 1:45 PM)   CT ABDOMEN PELVIS W/O & W CONTRAST with PHCT1   [facility] CT Scan ([facility])    [address] [place] [address] [id number] [phone]           Please bring any scans or X-rays taken at other hospitals, if similar tests were done. Also bring a list of your medicines, including vitamins, minerals and over-the-counter drugs. It is safest to leave personal items at home.  Be sure to tell your doctor:   If you have any allergies.   If there s any chance you are pregnant.   If you are breastfeeding.  You may  have contrast for this exam. To prepare:   Do not eat or drink for 2 hours before your exam. If you need to take medicine, you may take it with small sips of water. (We may ask you to take liquid medicine as well.)   The day before your exam, drink extra fluids at least six 8-ounce glasses (unless your doctor tells you to restrict your fluids).   You will be given instructions on how to drink the contrast.  Patients over 70 or patients with diabetes or kidney problems:   If you haven t had a blood test (creatinine test) within the last 30 days, the Cardiologist/Radiologist may require you to get this test prior to your exam.  If you have diabetes:   Continue to take your metformin medication on the day of your exam  Please wear loose clothing, such as a sweat suit or jogging clothes. Avoid snaps, zippers and other metal. We may ask you to undress and put on a hospital gown.  If you have any questions, please call the Imaging Department where you will have your exam.            Mar 07, 2018 10:00 AM CST   New Visit with Salvador Garcia MD   [facility]    [address] [facility] [id number] [phone]              Future tests that were ordered for you today     Open Future Orders        Priority Expected Expires Ordered    CT Abdomen Pelvis w/o & w Contrast Routine  2/20/2019 2/20/2018            Who to contact     If you have questions or need follow up information about today's clinic visit or your schedule please contact [facility] directly at [phone].  Normal or non-critical lab and imaging results will be communicated to you by MyChart, letter or phone within 4 business days after the clinic has received the results. If you do not hear from us within 7 days, please contact the clinic through MyChart or phone. If you have a critical or abnormal lab result, we will notify you by phone as soon as possible.  Submit refill requests through  "MyChart or call your pharmacy and they will forward the refill request to us. Please allow 3 business days for your refill to be completed.          Additional Information About Your Visit        MyChart Information     [employer] lets you send messages to your doctor, view your test results, renew your prescriptions, schedule appointments and more. To sign up, go to [email or web address] . Click on \"Log in\" on the left side of the screen, which will take you to the Welcome page. Then click on \"Sign up Now\" on the right side of the page.     You will be asked to enter the access code listed below, as well as some personal information. Please follow the directions to create your username and password.     Your access code is: [email or web address]  Expires: 5/10/2018 11:02 AM     Your access code will  in 90 days. If you need help or a new code, please call your [facility] clinic or [phone].        Care EveryWhere ID     This is your Care EveryWhere ID. This could be used by other organizations to access your [facility] medical records  [id number]        Your Vitals Were     Pulse Temperature Respirations Pulse Oximetry BMI (Body Mass Index)       86 97.9  F (36.6  C) (Oral) 16 100% 28.58 kg/m2        Blood Pressure from Last 3 Encounters:   18 128/78   18 104/66   17 128/82    Weight from Last 3 Encounters:   18 186 lb 6.4 oz (84.6 kg)   18 183 lb (83 kg)   17 191 lb 12.8 oz (87 kg)              We Performed the Following     *UA reflex to Microscopic and Culture ([place] [facility] (except [place] and [place])     Anti Treponema     CBC with platelets     Chlamydia trachomatis PCR     Hepatitis C antibody     HIV Antigen Antibody Combo     Neisseria gonorrhoeae PCR     UROLOGY ADULT REFERRAL        Primary Care Provider Office Phone # Fax #    Xiomara Moralez PA-C [phone]       [address] [facility] [address]  [facility] [address]        Equal " Access to Services     [facility] : Hadii aad ku hadmimisu Kristyali, wabinda luqadaha, qacorinneta kabonifacioberta allen. So [facility] [phone].    ATENCIÓN: Si habla español, tiene a grissom disposición servicios gratuitos de asistencia lingüística. Llame al [phone].    We comply with applicable federal civil rights laws and [place] laws. We do not discriminate on the basis of race, color, national origin, age, disability, sex, sexual orientation, or gender identity.            Thank you!     Thank you for choosing [facility]  for your care. Our goal is always to provide you with excellent care. Hearing back from our patients is one way we can continue to improve our services. Please take a few minutes to complete the written survey that you may receive in the mail after your visit with us. Thank you!             Your Updated Medication List - Protect others around you: Learn how to safely use, store and throw away your medicines at www.disposemymeds.org.          This list is accurate as of 2/20/18  3:25 PM.  Always use your most recent med list.                   Brand Name Dispense Instructions for use Diagnosis    HYDROcodone-acetaminophen 5-325 MG per tablet    NORCO     TK 1-2 TS PO Q 4 H PRN P  NM4        MULTIVITAMIN ADULT PO

## 2018-02-20 NOTE — NURSING NOTE
"Chief Complaint   Patient presents with     Recheck Medication     f/u antibiotic for bladder     Panel Management     mychart, flu, yohannes       Initial /78  Pulse 86  Temp 97.9  F (36.6  C) (Oral)  Resp 16  Wt 186 lb 6.4 oz (84.6 kg)  SpO2 100%  BMI 28.58 kg/m2 Estimated body mass index is 28.58 kg/(m^2) as calculated from the following:    Height as of 2/9/18: 5' 7.72\" (1.72 m).    Weight as of this encounter: 186 lb 6.4 oz (84.6 kg).  Medication Reconciliation: complete    "

## 2018-02-21 ENCOUNTER — HOSPITAL ENCOUNTER (OUTPATIENT)
Dept: CT IMAGING | Facility: CLINIC | Age: 28
Discharge: HOME | End: 2018-02-21
Attending: PHYSICIAN ASSISTANT | Admitting: PHYSICIAN ASSISTANT
Payer: COMMERCIAL

## 2018-02-21 DIAGNOSIS — R30.0 DYSURIA: ICD-10-CM

## 2018-02-21 DIAGNOSIS — R10.2 SUPRAPUBIC ABDOMINAL PAIN: ICD-10-CM

## 2018-02-21 LAB
C TRACH DNA SPEC QL NAA+PROBE: NEGATIVE
N GONORRHOEA DNA SPEC QL NAA+PROBE: NEGATIVE
SPECIMEN SOURCE: NORMAL
SPECIMEN SOURCE: NORMAL
T PALLIDUM IGG+IGM SER QL: NEGATIVE

## 2018-02-21 PROCEDURE — 74177 CT ABD & PELVIS W/CONTRAST: CPT

## 2018-02-21 PROCEDURE — 25000125 ZZHC RX 250: Performed by: PHYSICIAN ASSISTANT

## 2018-02-21 PROCEDURE — 25000128 H RX IP 250 OP 636: Performed by: PHYSICIAN ASSISTANT

## 2018-02-21 RX ORDER — IOPAMIDOL 755 MG/ML
500 INJECTION, SOLUTION INTRAVASCULAR ONCE
Status: COMPLETED | OUTPATIENT
Start: 2018-02-21 | End: 2018-02-21

## 2018-02-21 RX ADMIN — IOPAMIDOL 90 ML: 755 INJECTION, SOLUTION INTRAVENOUS at 14:13

## 2018-02-21 RX ADMIN — SODIUM CHLORIDE 60 ML: 9 INJECTION, SOLUTION INTRAVENOUS at 14:13

## 2018-02-22 ENCOUNTER — OFFICE VISIT (OUTPATIENT)
Dept: FAMILY MEDICINE | Facility: OTHER | Age: 28
End: 2018-02-22
Payer: COMMERCIAL

## 2018-02-22 ENCOUNTER — TELEPHONE (OUTPATIENT)
Dept: FAMILY MEDICINE | Facility: OTHER | Age: 28
End: 2018-02-22

## 2018-02-22 VITALS
SYSTOLIC BLOOD PRESSURE: 118 MMHG | RESPIRATION RATE: 14 BRPM | DIASTOLIC BLOOD PRESSURE: 70 MMHG | WEIGHT: 182.8 LBS | OXYGEN SATURATION: 96 % | BODY MASS INDEX: 28.03 KG/M2 | TEMPERATURE: 98.2 F | HEART RATE: 88 BPM

## 2018-02-22 DIAGNOSIS — N41.0 ACUTE PROSTATITIS: ICD-10-CM

## 2018-02-22 DIAGNOSIS — D69.6 THROMBOCYTOPENIA (H): Primary | ICD-10-CM

## 2018-02-22 DIAGNOSIS — R16.1 SPLEEN ENLARGEMENT: ICD-10-CM

## 2018-02-22 DIAGNOSIS — R35.0 URINARY FREQUENCY: ICD-10-CM

## 2018-02-22 DIAGNOSIS — R10.2 SUPRAPUBIC ABDOMINAL PAIN: ICD-10-CM

## 2018-02-22 LAB
BASOPHILS # BLD AUTO: 0 10E9/L (ref 0–0.2)
BASOPHILS NFR BLD AUTO: 0.3 %
DIFFERENTIAL METHOD BLD: ABNORMAL
EOSINOPHIL # BLD AUTO: 0.1 10E9/L (ref 0–0.7)
EOSINOPHIL NFR BLD AUTO: 1.4 %
ERYTHROCYTE [DISTWIDTH] IN BLOOD BY AUTOMATED COUNT: 13.3 % (ref 10–15)
HCT VFR BLD AUTO: 41.8 % (ref 40–53)
HCV AB SERPL QL IA: NONREACTIVE
HGB BLD-MCNC: 14.7 G/DL (ref 13.3–17.7)
HIV 1+2 AB+HIV1 P24 AG SERPL QL IA: NONREACTIVE
LYMPHOCYTES # BLD AUTO: 1.8 10E9/L (ref 0.8–5.3)
LYMPHOCYTES NFR BLD AUTO: 21.1 %
MCH RBC QN AUTO: 29.9 PG (ref 26.5–33)
MCHC RBC AUTO-ENTMCNC: 35.2 G/DL (ref 31.5–36.5)
MCV RBC AUTO: 85 FL (ref 78–100)
MONOCYTES # BLD AUTO: 0.6 10E9/L (ref 0–1.3)
MONOCYTES NFR BLD AUTO: 7.4 %
NEUTROPHILS # BLD AUTO: 6 10E9/L (ref 1.6–8.3)
NEUTROPHILS NFR BLD AUTO: 69.8 %
PLATELET # BLD AUTO: 116 10E9/L (ref 150–450)
RBC # BLD AUTO: 4.92 10E12/L (ref 4.4–5.9)
RETICS # AUTO: 104.3 10E9/L (ref 25–95)
RETICS/RBC NFR AUTO: 2.1 % (ref 0.5–2)
WBC # BLD AUTO: 8.7 10E9/L (ref 4–11)

## 2018-02-22 PROCEDURE — 85045 AUTOMATED RETICULOCYTE COUNT: CPT | Performed by: PHYSICIAN ASSISTANT

## 2018-02-22 PROCEDURE — 36415 COLL VENOUS BLD VENIPUNCTURE: CPT | Performed by: PHYSICIAN ASSISTANT

## 2018-02-22 PROCEDURE — 99214 OFFICE O/P EST MOD 30 MIN: CPT | Performed by: PHYSICIAN ASSISTANT

## 2018-02-22 PROCEDURE — 85060 BLOOD SMEAR INTERPRETATION: CPT | Performed by: PHYSICIAN ASSISTANT

## 2018-02-22 PROCEDURE — 85025 COMPLETE CBC W/AUTO DIFF WBC: CPT | Performed by: PHYSICIAN ASSISTANT

## 2018-02-22 RX ORDER — TAMSULOSIN HYDROCHLORIDE 0.4 MG/1
0.4 CAPSULE ORAL DAILY
Qty: 30 CAPSULE | Refills: 1 | Status: SHIPPED | OUTPATIENT
Start: 2018-02-22 | End: 2018-03-20

## 2018-02-22 ASSESSMENT — PAIN SCALES - GENERAL: PAINLEVEL: NO PAIN (1)

## 2018-02-22 NOTE — TELEPHONE ENCOUNTER
Please call patient     I would like him to come in today (best times 3:15 or 5:15 but will see him any time he can come except 11-1 as I have meetings) to discuss lab and CT results.     There is nothing to be concerned about at this point but some things we should explore further. It was also recommended that we repeat his rectal exam and possibly do a trial of some medications depending on the results of the rectal exam.       Sandor Moralez PA-C   [facility]

## 2018-02-22 NOTE — PROGRESS NOTES
SUBJECTIVE:   Shlomo Nelson is a 27 year old male who presents to clinic today for the following health issues:    Shlomo is here today to discuss results of his CT. He said since his last visit nothing has changed except that the pain is favoring his right side more than the middle now.   - Never had Mono as a teen      HPI    Problem list and histories reviewed & adjusted, as indicated.  Additional history: as documented    Labs reviewed in EPIC    ROS:  Constitutional, HEENT, cardiovascular, pulmonary, gi and gu systems are negative, except as otherwise noted.    OBJECTIVE:     /70  Pulse 88  Temp 98.2  F (36.8  C) (Oral)  Resp 14  Wt 182 lb 12.8 oz (82.9 kg)  SpO2 96%  BMI 28.03 kg/m2  Body mass index is 28.03 kg/(m^2).  GENERAL APPEARANCE: healthy, alert and no distress  EYES: Eyes grossly normal to inspection, PERRLA, conjunctivae and sclerae without injection or discharge, EOM intact   ABDOMEN: Soft, mildly tender to palpation of suprapubic and slightly right lower quadrant, no hepatosplenomegaly, no masses   (MALE): Normal male genitalia, no hernias palpated in inguinal areas, no discharge in penile meatus, bilateral testicles non-tender and without masses  Rectal Exam: Prostate of normal size and firmness with no tenderness to palpation, no nodules appreciated  SKIN: No suspicious lesions or rashes, hydration status appears adeuqate with normal skin turgor   PSYCH: Alert and oriented x3; speech- coherent , normal rate and volume; able to articulate logical thoughts, able to abstract reason, no tangential thoughts, no hallucinations or delusions, mentation appears normal, Mood is euthymic. Affect is appropriate for this mood state and bright. Thought content is free of suicidal ideation, hallucinations, and delusions. Dress is adequate and upkept. Eye contact is good during conversation.       Diagnostic Test Results:  See orders pending in Epic     ASSESSMENT/PLAN:       ICD-10-CM    1.  Thrombocytopenia (H) D69.6 Blood Morphology Pathologist Review     CBC with platelets differential     Reticulocyte Count     US Abdomen Limited   2. Spleen enlargement R16.1 US Abdomen Limited   3. Acute prostatitis N41.0 tamsulosin (FLOMAX) 0.4 MG capsule     - Case was reviewed at length with supervising physician Dr. Shauna Welsh     - Reviewed labs and CT findings with patient at length     Thrombocytopenia  - Mildly low platelets ongoing for almost 2 years; patient does not report any symptoms of excessive or easy bleeding   - Discussed potential to further work this up with blood morphology, repeat CBC w/ diff, reticulocyte count and patient agreed to proceed    Spleen Enlargement  - Incidental finding on CT abd/pelvis, upper limit of normal size on  - Patient is asymptomatic with this; no splenomegaly on exam  - US Limited Abdomen to further evaluate  - Reviewed with patient that neither the platelet issue or his spleen could be the cause of his pain     Acute Prostatitis (?) & suprapubic abdominal pain & urinary frequency  - Patient continues to have symptoms of urinary frequency and abdominal pain radiating to penis; prostate exam today was normal; UA and CT from earlier this week both unremarkable (ruling out GI etiology, renal etiology, etc)   - Patient completed Ciprofloxacin 500 mg BID for possible prostatitis due to tenderness on digital rectal exam, no change to pain, SUSSY today was non-tender   - Sexually transmitted infection testing was reviewed and all negative   - Continue to suspect that pain is originating in bladder or prostate, as previously, will recommend further evaluation and possible cystoscopy by urology, patient has appointment in 2 weeks   - In the meantime to help with his pain, will do a trial of Flomax (as proven can help with either chronic prostatitis - though seems unlikely vs bph which also seems unlikely due to age), reviewed use and side effects, if helps after 5-6 days, could  try increasing to 2 tablets     The patient indicates understanding of these issues and agrees with the plan.    Follow up: with specialist, and pending labs and imaging       Xiomara Moralez PA-C  [facility]

## 2018-02-22 NOTE — MR AVS SNAPSHOT
After Visit Summary   2/22/2018    Shlomo Nelson    MRN: [id number]           Patient Information     Date Of Birth          1990        Visit Information        Provider Department      2/22/2018 3:15 PM Xiomara Moralez PA-C [facility]        Today's Diagnoses     Thrombocytopenia (H)    -  1    Spleen enlargement        Acute prostatitis          Care Instructions    1. Start Flomax - once a day, after 5-6 days if helping some, can increase to 2 tablets      2 . Ultrasound of spleen and labs today    3. Follow up with Dr. Garcia as scheduled           Follow-ups after your visit        Your next 10 appointments already scheduled     Feb 27, 2018  8:30 AM CST   US ABDOMEN LIMITED with ERUS1   [facility]    [address] [facility] [id number] [phone]           Please bring a list of your medicines (including vitamins, minerals and over-the-counter drugs). Also, tell your doctor about any allergies you may have. Wear comfortable clothes and leave your valuables at home.  Adults: No eating or drinking for 8 hours before the exam. You may take medicine with a small sip of water.  Children: - Children 6+ years: No food or drink for 6 hours before exam. - Children 1-5 years: No food or drink for 4 hours before exam. - Infants, breast-fed: may have breast milk up to 2 hours before exam. - Infants, formula: may have bottle until 4 hours before exam.  Please call the Imaging Department at your exam site with any questions.            Mar 07, 2018 10:00 AM CST   New Visit with Salvador Garcia MD   [facility]    [address] [facility] [id number] [phone]              Future tests that were ordered for you today     Open Future Orders        Priority Expected Expires Ordered    US Abdomen Limited Routine  2/22/2019 2/22/2018            Who to contact     If you  "have questions or need follow up information about today's clinic visit or your schedule please contact [facility] [place] directly at [phone].  Normal or non-critical lab and imaging results will be communicated to you by MyChart, letter or phone within 4 business days after the clinic has received the results. If you do not hear from us within 7 days, please contact the clinic through [employer] or phone. If you have a critical or abnormal lab result, we will notify you by phone as soon as possible.  Submit refill requests through [employer] or call your pharmacy and they will forward the refill request to us. Please allow 3 business days for your refill to be completed.          Additional Information About Your Visit        [email or web address] Information     [employer] lets you send messages to your doctor, view your test results, renew your prescriptions, schedule appointments and more. To sign up, go to [email or web address] . Click on \"Log in\" on the left side of the screen, which will take you to the Welcome page. Then click on \"Sign up Now\" on the right side of the page.     You will be asked to enter the access code listed below, as well as some personal information. Please follow the directions to create your username and password.     Your access code is: [email or web address]  Expires: 5/10/2018 11:02 AM     Your access code will  in 90 days. If you need help or a new code, please call your [place] clinic or [phone].        Care EveryWhere ID     This is your Care EveryWhere ID. This could be used by other organizations to access your [place] medical records  [id number]        Your Vitals Were     Pulse Temperature Respirations Pulse Oximetry BMI (Body Mass Index)       88 98.2  F (36.8  C) (Oral) 14 96% 28.03 kg/m2        Blood Pressure from Last 3 Encounters:   18 118/70   18 128/78   18 104/66    Weight from Last 3 Encounters:   18 182 lb 12.8 oz (82.9 kg)   18 186 lb " 6.4 oz (84.6 kg)   02/09/18 183 lb (83 kg)              We Performed the Following     Blood Morphology Pathologist Review     CBC with platelets differential     Reticulocyte Count          Today's Medication Changes          These changes are accurate as of 2/22/18  3:57 PM.  If you have any questions, ask your nurse or doctor.               Start taking these medicines.        Dose/Directions    tamsulosin 0.4 MG capsule   Commonly known as:  FLOMAX   Used for:  Acute prostatitis   Started by:  Xiomara Moralez PA-C        Dose:  0.4 mg   Take 1 capsule (0.4 mg) by mouth daily   Quantity:  30 capsule   Refills:  1            Where to get your medicines      These medications were sent to [facility] [address] [place] [id number] [facility] AT [facility]  [address] [facility], [facility] [address]     Phone:  [phone]     tamsulosin 0.4 MG capsule                Primary Care Provider Office Phone # Fax #    Xiomara Moralez PA-C [phone]       [address] [place] [address] [address]  [facility] [address]        Equal Access to Services     YOHAN SMITH : Hadii aad ku hadasho Soomaali, waaxda luqadaha, qaybta kaalmada adeegyada, waxay idiin hayaan marley last . So [facility] [phone].    ATENCIÓN: Si habla español, tiene a grissom disposición servicios gratuitos de asistencia lingüística. Llame al [phone].    We comply with applicable federal civil rights laws and [place] laws. We do not discriminate on the basis of race, color, national origin, age, disability, sex, sexual orientation, or gender identity.            Thank you!     Thank you for choosing [facility]  for your care. Our goal is always to provide you with excellent care. Hearing back from our patients is one way we can continue to improve our services. Please take a few minutes to complete the written survey that you may receive in the mail after your visit with us. Thank  you!             Your Updated Medication List - Protect others around you: Learn how to safely use, store and throw away your medicines at www.disposemymeds.org.          This list is accurate as of 2/22/18  3:57 PM.  Always use your most recent med list.                   Brand Name Dispense Instructions for use Diagnosis    HYDROcodone-acetaminophen 5-325 MG per tablet    NORCO     TK 1-2 TS PO Q 4 H PRN P  NM4        MULTIVITAMIN ADULT PO           tamsulosin 0.4 MG capsule    FLOMAX    30 capsule    Take 1 capsule (0.4 mg) by mouth daily    Acute prostatitis

## 2018-02-22 NOTE — NURSING NOTE
"Chief Complaint   Patient presents with     Results       Initial /70  Pulse 88  Temp 98.2  F (36.8  C) (Oral)  Resp 14  Wt 182 lb 12.8 oz (82.9 kg)  SpO2 96%  BMI 28.03 kg/m2 Estimated body mass index is 28.03 kg/(m^2) as calculated from the following:    Height as of 2/9/18: 5' 7.72\" (1.72 m).    Weight as of this encounter: 182 lb 12.8 oz (82.9 kg).  Medication Reconciliation: complete    "

## 2018-02-22 NOTE — PATIENT INSTRUCTIONS
1. Start Flomax - once a day, after 5-6 days if helping some, can increase to 2 tablets      2 . Ultrasound of spleen and labs today    3. Follow up with Dr. Garcia as scheduled

## 2018-02-26 LAB — COPATH REPORT: NORMAL

## 2018-02-27 ENCOUNTER — TELEPHONE (OUTPATIENT)
Dept: FAMILY MEDICINE | Facility: OTHER | Age: 28
End: 2018-02-27

## 2018-02-27 ENCOUNTER — RADIANT APPOINTMENT (OUTPATIENT)
Dept: ULTRASOUND IMAGING | Facility: OTHER | Age: 28
End: 2018-02-27
Attending: PHYSICIAN ASSISTANT
Payer: COMMERCIAL

## 2018-02-27 DIAGNOSIS — R16.1 SPLEEN ENLARGEMENT: ICD-10-CM

## 2018-02-27 DIAGNOSIS — D69.6 THROMBOCYTOPENIA (H): ICD-10-CM

## 2018-02-27 DIAGNOSIS — D69.6 THROMBOCYTOPENIA (H): Primary | ICD-10-CM

## 2018-02-27 PROCEDURE — 76705 ECHO EXAM OF ABDOMEN: CPT

## 2018-02-27 NOTE — TELEPHONE ENCOUNTER
Called patient to discuss Ultrasound of Spleen and Blood Smear     Informed of below since   - Splenomegaly with thrombocytopenia (both mild), various etiology as listed on blood smear, recommend hematology eval  - Gave patient number to schedule with Heme/onc in [place], referral placed    - Not feeling any better with new medication, has appointment with urology in 2 days       Sandor Hussein-JUAN Eng  [facility]

## 2018-03-01 ENCOUNTER — OFFICE VISIT (OUTPATIENT)
Dept: UROLOGY | Facility: OTHER | Age: 28
End: 2018-03-01
Payer: COMMERCIAL

## 2018-03-01 VITALS — SYSTOLIC BLOOD PRESSURE: 118 MMHG | RESPIRATION RATE: 16 BRPM | DIASTOLIC BLOOD PRESSURE: 78 MMHG | HEART RATE: 86 BPM

## 2018-03-01 DIAGNOSIS — R10.2 PELVIC PAIN IN MALE: Primary | ICD-10-CM

## 2018-03-01 PROCEDURE — 99243 OFF/OP CNSLTJ NEW/EST LOW 30: CPT | Performed by: UROLOGY

## 2018-03-01 NOTE — PROGRESS NOTES
S: See dictated note   Current Outpatient Prescriptions   Medication Sig Dispense Refill     tamsulosin (FLOMAX) 0.4 MG capsule Take 1 capsule (0.4 mg) by mouth daily 30 capsule 1     HYDROcodone-acetaminophen (NORCO) 5-325 MG per tablet TK 1-2 TS PO Q 4 H PRN P  NM4  0     Multiple Vitamins-Minerals (MULTIVITAMIN ADULT PO)        No Known Allergies  Past Medical History:   Diagnosis Date     NO ACTIVE PROBLEMS      Past Surgical History:   Procedure Laterality Date     FINGER SURGERY        Family History   Problem Relation Age of Onset     Lipids Father      Hypertension Maternal Grandmother      Hypertension Maternal Grandfather      DIABETES Paternal Grandmother      Hypertension Paternal Grandmother      Arthritis Paternal Grandmother      DIABETES Paternal Grandfather      Hypertension Paternal Grandfather      Prostate Cancer Paternal Grandfather      Cancer - colorectal Paternal Grandfather      CANCER Paternal Grandfather      kidney     GASTROINTESTINAL DISEASE Brother      IBS     Asthma No family hx of      C.A.D. No family hx of      CEREBROVASCULAR DISEASE No family hx of      Alcohol/Drug No family hx of      Allergies No family hx of      Alzheimer Disease No family hx of      Anesthesia Reaction No family hx of      Blood Disease No family hx of      Cardiovascular No family hx of      Circulatory No family hx of      Congenital Anomalies No family hx of      Connective Tissue Disorder No family hx of      Depression No family hx of      Eye Disorder No family hx of      Genetic Disorder No family hx of      Genitourinary Problems No family hx of      Gynecology No family hx of      HEART DISEASE No family hx of      Musculoskeletal Disorder No family hx of      Neurologic Disorder No family hx of      Obesity No family hx of      OSTEOPOROSIS No family hx of      Psychotic Disorder No family hx of      Respiratory No family hx of      Thyroid Disease No family hx of      Hearing Loss No family hx  of      Social History     Social History     Marital status: Single     Spouse name: N/A     Number of children: N/A     Years of education: N/A     Social History Main Topics     Smoking status: Former Smoker     Smokeless tobacco: Former User     Types: Chew      Comment: E cig      Alcohol use 0.0 oz/week     0 Standard drinks or equivalent per week      Comment: once a week; few drinks     Drug use: No     Sexual activity: Yes     Partners: Female     Birth control/ protection: Condom, Pill     Other Topics Concern     None     Social History Narrative       REVIEW OF SYSTEMS  =================  C: NEGATIVE for fever, chills, change in weight  I: NEGATIVE for worrisome rashes, moles or lesions  E/M: NEGATIVE for ear, mouth and throat problems  R: NEGATIVE for significant cough or SHORTNESS OF BREATH  CV:  NEGATIVE for chest pain, palpitations or peripheral edema  GI: NEGATIVE for nausea, abdominal pain, heartburn, or change in bowel habits  NEURO: NEGATIVE numbness/weakness  : see HPI  PSYCH: NEGATIVE depression/anxiety  LYmph: no new enlarged lymph nodes  Ortho: no new trauma/movements      Physical Exam:  /78 (BP Location: Right arm, Patient Position: Chair, Cuff Size: Adult Regular)  Pulse 86  Resp 16   Patient is pleasant, in no acute distress, good general condition.  HEENT:  Normalcephalic, atraumatic  Lung: no evidence of respiratory distress    Abdomen: Soft, nondistended, non tender. No masses. No rebound or guarding.   Exam: penis no d/c. Testis no masses.  No scrotal skin lesion.  No suprapubic mass.  Prostate 20 gm smooth non tender.  Skin: Warm and dry.  No redness.  Neuro: grossly normal  Psych normal mood and affect  Musculoskeletal  moving all extremities    Assessment/Plan:   See dictated note

## 2018-03-01 NOTE — NURSING NOTE
"Chief Complaint   Patient presents with     Consult     abdominal pain, feels like it's over the bladder.        Initial /78 (BP Location: Right arm, Patient Position: Chair, Cuff Size: Adult Regular)  Pulse 86  Resp 16 Estimated body mass index is 28.03 kg/(m^2) as calculated from the following:    Height as of 2/9/18: 1.72 m (5' 7.72\").    Weight as of 2/22/18: 82.9 kg (182 lb 12.8 oz).  Medication Reconciliation: complete     Yokasta Baron RN      "

## 2018-03-01 NOTE — PATIENT INSTRUCTIONS
You are being referred to physical therapy for pelvic pain.    If you have not heard from the scheduling office within 2 business days, please call [phone] for all locations.    Please call [phone] if you have any questions.

## 2018-03-01 NOTE — PROGRESS NOTES
Visit Date:   03/01/2018      SUBJECTIVE:  The patient is a 27-year-old  male who was requested to be seen by Xiomara Moralez for a consultation with regard to patient's pelvic pain.  The patient states that his pelvic pain started about a month ago.  It now is located in the midline, but there are times he has it on both sides, right and left side.  It does sometimes radiate to his upper thigh and scrotal area.  It is a dull achy pain.  There are no obvious aggravating or relieving factors.  He has noticed also some increased irritative voiding symptoms previously, but not so much recently.  He has no hematuria or dysuria.  He has no obstructive voiding symptoms.  He has no fever, nausea or vomiting.  He always has some hard stool, but not lately.  He was treated initially for possible prostatitis with antibiotics, which did not seem to help with the problem.  His labs were completely normal including a urinalysis and also a white blood cell count.  Because of continual pain and nonresponsive to antibiotics, he had a CT scan of the abdomen and pelvis recently and that was also unremarkable except for splenomegaly.  He is seeing a hematologist in the future for that.  He was also tested for STDs at least, was also unremarkable.      ASSESSMENT:  A 27-year-old  male with pelvic pain with irritative voiding symptoms.      RECOMMENDATION:  I had a long discussion with the patient today with regard to his situation.  I told the patient that sometimes it is very difficult to determine the exact etiology of this pelvic discomfort.  At this time right now, I see no evidence of prostatitis or cystitis.  Given the fact that his pain is located in the pelvic region with radiation to the upper thigh and testicular area, this is most likely pelvic muscle sprain or strain.  The anatomy was discussed with the patient at length.  I told the patient that this usually takes several months for it to go  away.  I offered the patient physical therapy as an option if he wants to try that first.  The patient is agreeable to the plan.  A referral was made to physical therapy.  If symptoms have not improved, he can come back to see me for reexamination.         JULIO YAP MD             D: 2018   T: 2018   MT: GORDON      Name:     ANABELLA CUNHA   MRN:      [id number]        Account:      [id number]   :      1990           Visit Date:   2018      Document: [id number]       cc: Xiomara Moralez PA-C

## 2018-03-01 NOTE — MR AVS SNAPSHOT
"              After Visit Summary   3/1/2018    Shlomo Nelson    MRN: [id number]           Patient Information     Date Of Birth          1990        Visit Information        Provider Department      3/1/2018 8:00 AM Salvador Garcia MD [facility]        Today's Diagnoses     Pelvic pain in male    -  1      Care Instructions    You are being referred to physical therapy for pelvic pain.    If you have not heard from the scheduling office within 2 business days, please call [phone] for all locations.    Please call [phone] if you have any questions.           Follow-ups after your visit        Additional Services     PHYSICAL THERAPY REFERRAL       *This therapy referral will be filtered to a centralized scheduling office at [facility] and the patient will receive a call to schedule an appointment at a [place] location most convenient for them. *     [facility] provides Physical Therapy evaluation and treatment and many specialty services across the [place] system.  If requesting a specialty program, please choose from the list below.    If you have not heard from the scheduling office within 2 business days, please call [phone] for all locations, with the exception of [place], please call [phone] and [facility], please call [phone]  Treatment: Evaluation & Treatment  Special Instructions/Modalities: pelvic pain  Special Programs: Pelvic pain     Please be aware that coverage of these services is subject to the terms and limitations of your health insurance plan.  Call member services at your health plan with any benefit or coverage questions.      **Note to Provider:  If you are referring outside of [place] for the therapy appointment, please list the name of the location in the \"special instructions\" above, print the referral and give to the patient to schedule the appointment.                  Who to contact     If you " "have questions or need follow up information about today's clinic visit or your schedule please contact [facility] [place] directly at [phone].  Normal or non-critical lab and imaging results will be communicated to you by MyChart, letter or phone within 4 business days after the clinic has received the results. If you do not hear from us within 7 days, please contact the clinic through [employer] or phone. If you have a critical or abnormal lab result, we will notify you by phone as soon as possible.  Submit refill requests through [employer] or call your pharmacy and they will forward the refill request to us. Please allow 3 business days for your refill to be completed.          Additional Information About Your Visit        [employer] (PVT) Information     [employer] lets you send messages to your doctor, view your test results, renew your prescriptions, schedule appointments and more. To sign up, go to [email or web address] . Click on \"Log in\" on the left side of the screen, which will take you to the Welcome page. Then click on \"Sign up Now\" on the right side of the page.     You will be asked to enter the access code listed below, as well as some personal information. Please follow the directions to create your username and password.     Your access code is: [email or web address]  Expires: 5/10/2018 11:02 AM     Your access code will  in 90 days. If you need help or a new code, please call your [place] clinic or [phone].        Care EveryWhere ID     This is your Care EveryWhere ID. This could be used by other organizations to access your [place] medical records  [id number]        Your Vitals Were     Pulse Respirations                86 16           Blood Pressure from Last 3 Encounters:   18 118/78   18 118/70   18 128/78    Weight from Last 3 Encounters:   18 82.9 kg (182 lb 12.8 oz)   18 84.6 kg (186 lb 6.4 oz)   18 83 kg (183 lb)              We Performed the " Following     PHYSICAL THERAPY REFERRAL        Primary Care Provider Office Phone # Fax #    Xiomara GRANADOS JUAN Moralez [phone]       [address] [place] [address]        Equal Access to Services     JOHNIE SMITH : Hadii aad ku hadmimio Soomaali, waaxda luqadaha, qaybta kaalmada adeegyada, berta soliman hayaugust mattayuliyaleigh vargas. So [facility] [phone].    ATENCIÓN: Si habla español, tiene a grissom disposición servicios gratuitos de asistencia lingüística. Llame al [phone].    We comply with applicable federal civil rights laws and [place] laws. We do not discriminate on the basis of race, color, national origin, age, disability, sex, sexual orientation, or gender identity.            Thank you!     Thank you for choosing [facility]  for your care. Our goal is always to provide you with excellent care. Hearing back from our patients is one way we can continue to improve our services. Please take a few minutes to complete the written survey that you may receive in the mail after your visit with us. Thank you!             Your Updated Medication List - Protect others around you: Learn how to safely use, store and throw away your medicines at www.disposemymeds.org.          This list is accurate as of 3/1/18  8:24 AM.  Always use your most recent med list.                   Brand Name Dispense Instructions for use Diagnosis    HYDROcodone-acetaminophen 5-325 MG per tablet    NORCO     TK 1-2 TS PO Q 4 H PRN P  NM4        MULTIVITAMIN ADULT PO           tamsulosin 0.4 MG capsule    FLOMAX    30 capsule    Take 1 capsule (0.4 mg) by mouth daily    Acute prostatitis

## 2018-03-20 ENCOUNTER — ONCOLOGY VISIT (OUTPATIENT)
Dept: ONCOLOGY | Facility: CLINIC | Age: 28
End: 2018-03-20
Payer: COMMERCIAL

## 2018-03-20 VITALS
RESPIRATION RATE: 16 BRPM | HEART RATE: 86 BPM | OXYGEN SATURATION: 98 % | HEIGHT: 68 IN | WEIGHT: 191.5 LBS | DIASTOLIC BLOOD PRESSURE: 86 MMHG | BODY MASS INDEX: 29.02 KG/M2 | TEMPERATURE: 97.4 F | SYSTOLIC BLOOD PRESSURE: 135 MMHG

## 2018-03-20 DIAGNOSIS — D69.6 THROMBOCYTOPENIA (H): Primary | ICD-10-CM

## 2018-03-20 DIAGNOSIS — R16.1 SPLEEN ENLARGEMENT: ICD-10-CM

## 2018-03-20 LAB
ALBUMIN SERPL-MCNC: 4.5 G/DL (ref 3.4–5)
ALP SERPL-CCNC: 67 U/L (ref 40–150)
ALT SERPL W P-5'-P-CCNC: 36 U/L (ref 0–70)
ANION GAP SERPL CALCULATED.3IONS-SCNC: 10 MMOL/L (ref 3–14)
AST SERPL W P-5'-P-CCNC: 19 U/L (ref 0–45)
BASOPHILS # BLD AUTO: 0 10E9/L (ref 0–0.2)
BASOPHILS NFR BLD AUTO: 0.4 %
BILIRUB SERPL-MCNC: 0.6 MG/DL (ref 0.2–1.3)
BUN SERPL-MCNC: 13 MG/DL (ref 7–30)
CALCIUM SERPL-MCNC: 9 MG/DL (ref 8.5–10.1)
CHLORIDE SERPL-SCNC: 104 MMOL/L (ref 94–109)
CO2 SERPL-SCNC: 27 MMOL/L (ref 20–32)
CREAT SERPL-MCNC: 0.78 MG/DL (ref 0.66–1.25)
DIFFERENTIAL METHOD BLD: ABNORMAL
EOSINOPHIL # BLD AUTO: 0.1 10E9/L (ref 0–0.7)
EOSINOPHIL NFR BLD AUTO: 1.3 %
ERYTHROCYTE [DISTWIDTH] IN BLOOD BY AUTOMATED COUNT: 13.7 % (ref 10–15)
FERRITIN SERPL-MCNC: 164 NG/ML (ref 26–388)
GFR SERPL CREATININE-BSD FRML MDRD: >90 ML/MIN/1.7M2
GLUCOSE SERPL-MCNC: 109 MG/DL (ref 70–99)
HCT VFR BLD AUTO: 41.5 % (ref 40–53)
HGB BLD-MCNC: 14.4 G/DL (ref 13.3–17.7)
IMM GRANULOCYTES # BLD: 0.1 10E9/L (ref 0–0.4)
IMM GRANULOCYTES NFR BLD: 1.2 %
IRON SATN MFR SERPL: 31 % (ref 15–46)
IRON SERPL-MCNC: 104 UG/DL (ref 35–180)
LDH SERPL L TO P-CCNC: 168 U/L (ref 85–227)
LYMPHOCYTES # BLD AUTO: 2 10E9/L (ref 0.8–5.3)
LYMPHOCYTES NFR BLD AUTO: 30 %
MCH RBC QN AUTO: 29 PG (ref 26.5–33)
MCHC RBC AUTO-ENTMCNC: 34.7 G/DL (ref 31.5–36.5)
MCV RBC AUTO: 84 FL (ref 78–100)
MONOCYTES # BLD AUTO: 0.6 10E9/L (ref 0–1.3)
MONOCYTES NFR BLD AUTO: 8.2 %
NEUTROPHILS # BLD AUTO: 4 10E9/L (ref 1.6–8.3)
NEUTROPHILS NFR BLD AUTO: 58.9 %
PLATELET # BLD AUTO: 119 10E9/L (ref 150–450)
POTASSIUM SERPL-SCNC: 4 MMOL/L (ref 3.4–5.3)
PROT SERPL-MCNC: 7.5 G/DL (ref 6.8–8.8)
RBC # BLD AUTO: 4.96 10E12/L (ref 4.4–5.9)
SODIUM SERPL-SCNC: 141 MMOL/L (ref 133–144)
TIBC SERPL-MCNC: 337 UG/DL (ref 240–430)
VIT B12 SERPL-MCNC: 588 PG/ML (ref 193–986)
WBC # BLD AUTO: 6.7 10E9/L (ref 4–11)

## 2018-03-20 PROCEDURE — 00000401 ZZHCL STATISTIC THROMBIN TIME NC: Performed by: INTERNAL MEDICINE

## 2018-03-20 PROCEDURE — 83615 LACTATE (LD) (LDH) ENZYME: CPT | Performed by: INTERNAL MEDICINE

## 2018-03-20 PROCEDURE — 80053 COMPREHEN METABOLIC PANEL: CPT | Performed by: INTERNAL MEDICINE

## 2018-03-20 PROCEDURE — 82728 ASSAY OF FERRITIN: CPT | Performed by: INTERNAL MEDICINE

## 2018-03-20 PROCEDURE — 36415 COLL VENOUS BLD VENIPUNCTURE: CPT | Performed by: INTERNAL MEDICINE

## 2018-03-20 PROCEDURE — 83550 IRON BINDING TEST: CPT | Performed by: INTERNAL MEDICINE

## 2018-03-20 PROCEDURE — 83540 ASSAY OF IRON: CPT | Performed by: INTERNAL MEDICINE

## 2018-03-20 PROCEDURE — 82607 VITAMIN B-12: CPT | Performed by: INTERNAL MEDICINE

## 2018-03-20 PROCEDURE — 85613 RUSSELL VIPER VENOM DILUTED: CPT | Performed by: INTERNAL MEDICINE

## 2018-03-20 PROCEDURE — 00000167 ZZHCL STATISTIC INR NC: Performed by: INTERNAL MEDICINE

## 2018-03-20 PROCEDURE — 85025 COMPLETE CBC W/AUTO DIFF WBC: CPT | Performed by: INTERNAL MEDICINE

## 2018-03-20 PROCEDURE — 85730 THROMBOPLASTIN TIME PARTIAL: CPT | Performed by: INTERNAL MEDICINE

## 2018-03-20 PROCEDURE — 99204 OFFICE O/P NEW MOD 45 MIN: CPT | Performed by: INTERNAL MEDICINE

## 2018-03-20 ASSESSMENT — PAIN SCALES - GENERAL: PAINLEVEL: NO PAIN (0)

## 2018-03-20 NOTE — LETTER
3/20/2018         RE: Shlomo Nelson  [id number]  [facility] [address]        Dear Colleague,    Thank you for referring your patient, Shlomo Nelson, to the [facility]. Please see a copy of my visit note below.    DATE OF VISIT: Mar 20, 2018    REASON FOR REFERRAL: Management of thrombocytopenia.    CHIEF COMPLAINT:   Chief Complaint   Patient presents with     Consult     Thrombocytopenia. Enlarged spleen.       HISTORY OF PRESENT ILLNESS:   28-year-old male patient who presented today for evaluation for thrombus cytopenia.  A routine blood work he was found to have low platelet count at 1 21,000.  The rest of CBC was normal including total white count and hemoglobin.  Patient denies any bruising or bleeding.  Denies any skin rash.  Denies any joint aches or pains.  Denies any history of exposure to hepatitis C or HIV.  Rest of workup including hepatitis C and HIV came back negative.  Repeat CBC again showed platelet count of 116,000.  Patient not currently on any medication.  He drinks alcohol quite a bit.  Usually he drinks on the weekends  10 beers .  patient is here today to discuss further management plan.    REVIEW OF SYSTEMS:   Constitutional: Negative for fever, chills, and night sweats.  Skin: negative.  Eyes: negative.  Ears/Nose/Throat: negative.  Respiratory: No shortness of breath, dyspnea on exertion, cough, or hemoptysis.  Cardiovascular: negative.  Gastrointestinal: negative.  Genitourinary: negative.  Musculoskeletal: negative.  Neurologic: negative.  Psychiatric: negative.  Hematologic/Lymphatic/Immunologic: negative.  Endocrine: negative.    PAST MEDICAL HISTORY:   Past Medical History:   Diagnosis Date     NO ACTIVE PROBLEMS        PAST SURGICAL HISTORY:   Past Surgical History:   Procedure Laterality Date     FINGER SURGERY         ALLERGIES:   Allergies as of 03/20/2018     (No Known Allergies)       MEDICATIONS:   No current outpatient prescriptions on file.         FAMILY HISTORY:   Family History   Problem Relation Age of Onset     Lipids Father      Hypertension Maternal Grandmother      Hypertension Maternal Grandfather      DIABETES Paternal Grandmother      Hypertension Paternal Grandmother      Arthritis Paternal Grandmother      DIABETES Paternal Grandfather      Hypertension Paternal Grandfather      Prostate Cancer Paternal Grandfather      Cancer - colorectal Paternal Grandfather      CANCER Paternal Grandfather      kidney     GASTROINTESTINAL DISEASE Brother      IBS     Asthma No family hx of      C.A.D. No family hx of      CEREBROVASCULAR DISEASE No family hx of      Alcohol/Drug No family hx of      Allergies No family hx of      Alzheimer Disease No family hx of      Anesthesia Reaction No family hx of      Blood Disease No family hx of      Cardiovascular No family hx of      Circulatory No family hx of      Congenital Anomalies No family hx of      Connective Tissue Disorder No family hx of      Depression No family hx of      Eye Disorder No family hx of      Genetic Disorder No family hx of      Genitourinary Problems No family hx of      Gynecology No family hx of      HEART DISEASE No family hx of      Musculoskeletal Disorder No family hx of      Neurologic Disorder No family hx of      Obesity No family hx of      OSTEOPOROSIS No family hx of      Psychotic Disorder No family hx of      Respiratory No family hx of      Thyroid Disease No family hx of      Hearing Loss No family hx of       Family history was reviewed this patient today.    SOCIAL HISTORY:   Social History     Social History     Marital status: Single     Spouse name: N/A     Number of children: N/A     Years of education: N/A     Social History Main Topics     Smoking status: Former Smoker     Smokeless tobacco: Former User     Types: Chew      Comment: E cig      Alcohol use 0.0 oz/week     0 Standard drinks or equivalent per week      Comment: once a week; few drinks     Drug  "use: No     Sexual activity: Yes     Partners: Female     Birth control/ protection: Condom, Pill     Other Topics Concern     None     Social History Narrative       PHYSICAL EXAMINATION:   /86 (BP Location: Right arm, Patient Position: Chair, Cuff Size: Adult Large)  Pulse 86  Temp 97.4  F (36.3  C) (Temporal)  Resp 16  Ht 1.715 m (5' 7.5\")  Wt 86.9 kg (191 lb 8 oz)  SpO2 98%  BMI 29.55 kg/m2  Wt Readings from Last 10 Encounters:   03/20/18 86.9 kg (191 lb 8 oz)   02/22/18 82.9 kg (182 lb 12.8 oz)   02/20/18 84.6 kg (186 lb 6.4 oz)   02/09/18 83 kg (183 lb)   07/11/17 87 kg (191 lb 12.8 oz)   03/13/17 88 kg (194 lb)   01/02/17 86.2 kg (190 lb)   11/22/16 87.1 kg (192 lb)   02/26/16 84.4 kg (186 lb)   01/12/15 83 kg (183 lb)      GENERAL APPEARANCE: Healthy, alert and in no acute distress.  HEENT: Sclerae anicteric. PERRLA. Oropharynx without ulcers, lesions, or thrush.  NECK: Supple. No asymmetry or masses.  LYMPHATICS: No palpable cervical, supraclavicular, axillary, or inguinal lymphadenopathy.  RESP: Lungs clear to auscultation bilaterally without rales, rhonchi or wheezes.  CARDIOVASCULAR: Regular rate and rhythm. Normal S1, S2; no S3 or S4. No murmur, gallop, or rub.  ABDOMEN: Soft, nontender. Bowel sounds normal. No palpable organomegaly or masses.  MUSCULOSKELETAL: Extremities without gross deformities noted. No edema of bilateral lower extremities.  SKIN: No suspicious lesions or rashes.  NEURO: Alert and oriented x 3. Cranial nerves II-XII grossly intact.  PSYCHIATRIC: Mentation and affect appear normal.    LABORATORY RESULTS:  Office Visit on 02/22/2018   Component Date Value Ref Range Status     Copath Report 02/22/2018    Final                    Value:Patient Name: ANABELLA CUNHA  MR#: [id number]  Specimen #: [id number]  Collected: 2/22/2018  Received: 2/23/2018  Reported: 2/26/2018 17:00  Ordering Phy(s): ELIZABETH MALDONADO    For improved result formatting, select 'View " Enhanced Report Format' under   Linked Documents section.    TEST(S):  Peripheral Smear Morphology    FINAL DIAGNOSIS:  Peripheral blood morphology:  - Mild thrombocytopenia.    COMMENT:  Thrombocytopenia may be due to increased platelet destruction (immune   mediated such as idiopathic  thrombocytopenic purpura, SLE, drug induced or due to ITP, TTP, DIC,   infections such as anaplasma), due to  hypersplenism or due to bone marrow suppression (i.e. bone marrow   infiltrative process, myelodysplastic  syndrome, or toxin - such as alcohol, viral or drug induced), among other   causes.    Electronically signed out by:    Jose R Wei M.D.    CLINICAL HISTORY:  27 year old male.  From electronic medical record:  Mild thrombocytopenia   for about 2 years.  O                          n 2/21/2018 CT  of abdomen shows spleen at upper normal limit in size, measuring 12.7 cm   in greatest dimension.  Pelvic  pressure, unable to empty, dysuria, treated for prostatitis without   relief, suprapubic abdominal pain with  ultrasound of pelvis pending.  The patient uses alcohol.    PERIPHERAL BLOOD DATA:  PERIPHERAL BLOOD DATA (Date: 02/22/2018)  Patient Value (Reference Range >18 year old male)  8.66    WBC        (4.0-11.0 x 10*9/L)  4,92    RBC         (4.4-5.9 x 10*12/L)  14.7    HGB         (13.3-17.7 g/dL)  41.8    HCT         (40.0-53.0 %)  85.0    MCV        (78-100fL)  29.9    MCH        (26.5-33.0 pg)  35.2    MCHC      (31.5-36.5 g/dL)  13.3    RDW       (10.0-15.0 %)   116    PLT         (150-450 x 10*9/L)    PERIPHERAL BLOOD DIFFERENTIAL  (Reference ranges >18 year old)    Percent  70.0%  Neutrophils  23.0%  Lymphocytes   5.0%  Monocytes   1.0%  Eosinophils   1.0%  Basophils    Absolute  6.06   Neutrophils (Ref normal 1.6 - 8.3 x 10*9/L)  1.99   Lymphocytes  (Ref                           normal 0.8 - 5.3 x 10*9/L)  0.43   Monocytes  (Ref normal 0 -1.3 x 10*9/L)  0.09   Eosinophils  (Ref normal 0 - 0.7 x  10*9/L)  0.09   Basophils  (Ref normal 0 - 0.2 x 10*9/L)    PERIPHERAL BLOOD MORPHOLOGY:    ERYTHROCYTES:  The red cells are normocytic, normochromic and normal in   number for the patient's age and  gender. No significant anisopoikilocytosis is seen. No features of   hemolysis or increased polychromasia are  identified.  No intracellular microorganisms are identified.    LEUKOCYTES:  The leukocytes are normal in number, morphology and   differential distribution, except some of the  lymphocytes appear reactive. No immature precursors or evidence of   neutrophilic dysplasia is seen. No atypical  lymphoid cells are seen. No intracellular microorganisms are identified.    PLATELETS:  The platelets are mildly decreased in number and are normal   morphology.  Some of the platelets are  enlarged.    CPT Codes:  A: 41584-ZBXD    TESTING LAB LOCATION:  [address] [address]  [phone]    COLLECTION SITE:  Client:  [facility]  Location:  [facility] (P)       WBC 02/22/2018 8.7  4.0 - 11.0 10e9/L Final     RBC Count 02/22/2018 4.92  4.4 - 5.9 10e12/L Final     Hemoglobin 02/22/2018 14.7  13.3 - 17.7 g/dL Final     Hematocrit 02/22/2018 41.8  40.0 - 53.0 % Final     MCV 02/22/2018 85  78 - 100 fl Final     MCH 02/22/2018 29.9  26.5 - 33.0 pg Final     MCHC 02/22/2018 35.2  31.5 - 36.5 g/dL Final     RDW 02/22/2018 13.3  10.0 - 15.0 % Final     Platelet Count 02/22/2018 116* 150 - 450 10e9/L Final     Diff Method 02/22/2018 Automated Method   Final     % Neutrophils 02/22/2018 69.8  % Final     % Lymphocytes 02/22/2018 21.1  % Final     % Monocytes 02/22/2018 7.4  % Final     % Eosinophils 02/22/2018 1.4  % Final     % Basophils 02/22/2018 0.3  % Final     Absolute Neutrophil 02/22/2018 6.0  1.6 - 8.3 10e9/L Final     Absolute Lymphocytes 02/22/2018 1.8  0.8 - 5.3 10e9/L Final     Absolute Monocytes  02/22/2018 0.6  0.0 - 1.3 10e9/L Final     Absolute Eosinophils 02/22/2018 0.1  0.0 - 0.7 10e9/L Final     Absolute Basophils 02/22/2018 0.0  0.0 - 0.2 10e9/L Final     % Retic 02/22/2018 2.1* 0.5 - 2.0 % Final     Absolute Retic 02/22/2018 104.3* 25 - 95 10e9/L Final       IMAGING RESULTS:  Recent Results (from the past 744 hour(s))   CT Abdomen Pelvis w Contrast    Narrative    CT ABDOMEN AND PELVIS WITH CONTRAST  2/21/2018 2:24 PM    HISTORY:  Pelvic pressure, unable to empty. Treated for prostatitis  with no relief. Dysuria. Suprapubic abdominal pain.    TECHNIQUE: Scans obtained from the diaphragm through the pelvis with  oral and IV contrast, 90 mL Isovue-370.  Radiation dose for this scan  was reduced using automated exposure control, adjustment of the mA  and/or kV according to patient size, or iterative reconstruction  technique.    COMPARISON:  None.    FINDINGS: Visualized portions of the lung bases and mediastinal  contents are unremarkable.  There are no aggressive osseous lesions.      The spleen is upper normal limits for size measuring 12.7 x 9.0 x 11.3  cm in the AP, transverse and craniocaudal dimensions, respectively.  The gallbladder is partially decompressed. The liver, gallbladder,  pancreas, spleen, bilateral adrenal glands and bilateral kidneys  otherwise enhance normally. There is a mildly prominent right  extrarenal pelvis. No hydronephrosis, nephrolithiasis, hydroureter or  ureteral calculus is identified. The urinary bladder is grossly  unremarkable.    Prostate gland contains a few dystrophic calcifications but is  otherwise unremarkable. No obvious inflammatory changes are seen  around the prostate.    The colon is of normal caliber without pericolonic inflammatory change  to suggest acute diverticulitis. Appendix extends retrocecally into  the right side of the pelvis and terminates near the right sacroiliac  joint. It is grossly within normal limits. The small bowel is of  normal  caliber. The stomach contains ingested material but is  otherwise unremarkable. Oral contrast agent is seen in the small  bowel.    Aorta is grossly normal in appearance. No adenopathy, free fluid or  free air is seen in the peritoneal cavity.      Impression    IMPRESSION:  1. Spleen is upper normal limits for size.  2. 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  3. No other significant abnormalities are identified. No etiology for  patient's symptoms is seen.    ANJELICA HUSSEIN MD   US Abdomen Limited    Narrative    ULTRASOUND ABDOMEN LIMITED February 27, 2018 8:46 AM     HISTORY: Mildly enlarged spleen on CT scan, chronic thrombocytopenia.  Spleen enlargement.     COMPARISON: CT 2/21/2018, ultrasound 1/5/2017.      Impression    IMPRESSION: Mild splenomegaly with a length of 13.9 cm, not changed  significantly from January 2017.    LOI HERNANDEZ MD       ASSESSMENT AND PLAN:    (D69.6) Thrombocytopenia (H)  (primary encounter diagnosis)  I reviewed with patient causes for thrombocytopenia.  Today I will check  CBC with platelets differential, Comprehensive metabolic panel, Lactate Dehydrogenase, Iron and iron binding capacity, Ferritin, Vitamin  B12, Lupus Anticoagulant Panel.  I explained to the patient that the patient platelet count could be related to excessive alcohol drinking.  I emphasized the importance of quitting alcohol.  Other differential diagnoses include immune thrombocytopenia.  We will continue to monitor the patient platelet count I will see him again in 1 months or sooner if there are new developments or concerns.    (R16.1) Spleen enlargement  Spleen is moderately elevated on the CT scan.  We will continue to monitor.      The patient is ready to learn, no apparent learning barriers were identified, Diagnosis and treatment plans were explained to the patient. The patient expressed understanding of the content. The patient questions were answered to his  satisfaction.    Shari Fang MD    Chart documentation with Dragon Voice recognition Software. Although reviewed after completion, some words and grammatical errors may remain.    Again, thank you for allowing me to participate in the care of your patient.        Sincerely,        Shari Fang MD

## 2018-03-20 NOTE — NURSING NOTE
DISCHARGE PLAN:  Next appointments: See patient instruction section  Departure Mode: Ambulatory  Accompanied by: self  5 minutes for nursing discharge (face to face time)     Shlomo Nelson is here today for Hematology Consult.  Writing nurse seen patient after Medical Oncology appointment to address questions/concerns/coordinate care. Patient to have labs today.  Repeat labs in 1 month and then follow up with labs in 2 months. Patient ambulated by nurse to  to schedule follow up and/or lab appointments. See patient instructions and Oncologist's Progress note for further details. Questions and concerns addressed to patient's satisfaction. Patient verbalized and demonstrated understanding of plan.  Contact information provided and patient is encouraged to call with any that arise in the interim of care.    Joel Monique, RN, BSN, OCN   Oncology Care Coordinator [facility]  [phone]  3/20/2018, 2:58 PM

## 2018-03-20 NOTE — MR AVS SNAPSHOT
After Visit Summary   3/20/2018    Shlomo Nelson    MRN: [id number]           Patient Information     Date Of Birth          1990        Visit Information        Provider Department      3/20/2018 11:30 AM Shari Fang MD [facility]        Today's Diagnoses     Thrombocytopenia (H)    -  1      Care Instructions      Please follow up with Dr. Fang in 2 months.  Please schedule labs in [place] in 1 month and then come 15-30 minutes prior to follow up for more labs.     Labs today!    Lab Date/Time:    Follow Up Date/Time:     If you have any questions or concerns please feel free to call.    If you need to reschedule please call:  Clinic or Lab Appointment - [phone]  Infusion - [phone]  Imaging - [phone]    Joel Monique RN, BSN, OCN   Oncology Care Coordinator RN  [facility]  [phone]              Follow-ups after your visit        Follow-up notes from your care team     Return in about 2 months (around 5/20/2018) for Standing blood work.      Future tests that were ordered for you today     Open Standing Orders        Priority Remaining Interval Expires Ordered    CBC with platelets differential Routine 3/3 monthly 3/20/2019 3/20/2018          Open Future Orders        Priority Expected Expires Ordered    CBC with platelets differential Routine 3/20/2018 3/20/2019 3/20/2018    Comprehensive metabolic panel Routine 3/20/2018 3/20/2019 3/20/2018    Lactate Dehydrogenase Routine 3/20/2018 3/20/2019 3/20/2018            Who to contact     If you have questions or need follow up information about today's clinic visit or your schedule please contact [facility] directly at [phone].  Normal or non-critical lab and imaging results will be communicated to you by MyChart, letter or phone within 4 business days after the clinic has received the results. If you do not hear from us within 7 days, please contact the clinic through  "edPULSEhart or phone. If you have a critical or abnormal lab result, we will notify you by phone as soon as possible.  Submit refill requests through [employer] or call your pharmacy and they will forward the refill request to us. Please allow 3 business days for your refill to be completed.          Additional Information About Your Visit        edPULSEhart Information     [employer] lets you send messages to your doctor, view your test results, renew your prescriptions, schedule appointments and more. To sign up, go to [email or web address] . Click on \"Log in\" on the left side of the screen, which will take you to the Welcome page. Then click on \"Sign up Now\" on the right side of the page.     You will be asked to enter the access code listed below, as well as some personal information. Please follow the directions to create your username and password.     Your access code is: [email or web address]  Expires: 5/10/2018 12:02 PM     Your access code will  in 90 days. If you need help or a new code, please call your [facility] clinic or [phone].        Care EveryWhere ID     This is your Care EveryWhere ID. This could be used by other organizations to access your [facility] medical records  [id number]        Your Vitals Were     Pulse Temperature Respirations Height Pulse Oximetry BMI (Body Mass Index)    86 97.4  F (36.3  C) (Temporal) 16 1.715 m (5' 7.5\") 98% 29.55 kg/m2       Blood Pressure from Last 3 Encounters:   18 135/86   18 118/78   18 118/70    Weight from Last 3 Encounters:   18 86.9 kg (191 lb 8 oz)   18 82.9 kg (182 lb 12.8 oz)   18 84.6 kg (186 lb 6.4 oz)              We Performed the Following     Ferritin     Iron and iron binding capacity     Lupus Anticoagulant Panel     Vitamin B12        Primary Care Provider Office Phone # Fax #    Xiomara Moralez PA-C [phone]       [address]  [facility] [address]        Equal Access to Services "     JOHNIE SMITH : Hadii aad rita alexandro Lima, wabinda luqadaha, qaybta kaalmada marleysocratesconcepcion, berta mattayuliyaleigh vargas. So [facility] [phone].    ATENCIÓN: Si habla español, tiene a grissom disposición servicios gratuitos de asistencia lingüística. Llame al [phone].    We comply with applicable federal civil rights laws and [place] laws. We do not discriminate on the basis of race, color, national origin, age, disability, sex, sexual orientation, or gender identity.            Thank you!     Thank you for choosing [facility]  for your care. Our goal is always to provide you with excellent care. Hearing back from our patients is one way we can continue to improve our services. Please take a few minutes to complete the written survey that you may receive in the mail after your visit with us. Thank you!             Your Updated Medication List - Protect others around you: Learn how to safely use, store and throw away your medicines at www.disposemymeds.org.      Notice  As of 3/20/2018 12:07 PM    You have not been prescribed any medications.

## 2018-03-20 NOTE — NURSING NOTE
"Oncology Rooming Note    March 20, 2018 11:36 AM   Shlomo Nelson is a 28 year old male who presents for:    Chief Complaint   Patient presents with     Consult     Thrombocytopenia. Enlarged spleen.     Initial Vitals: /86 (BP Location: Right arm, Patient Position: Chair, Cuff Size: Adult Large)  Pulse 86  Temp 97.4  F (36.3  C) (Temporal)  Resp 16  Ht 1.715 m (5' 7.5\")  Wt 86.9 kg (191 lb 8 oz)  SpO2 98%  BMI 29.55 kg/m2 Estimated body mass index is 29.55 kg/(m^2) as calculated from the following:    Height as of this encounter: 1.715 m (5' 7.5\").    Weight as of this encounter: 86.9 kg (191 lb 8 oz). Body surface area is 2.03 meters squared.  No Pain (0) Comment: Data Unavailable   No LMP for male patient.  Allergies reviewed: Yes  Medications reviewed: Yes    Medications: Medication refills not needed today.  Pharmacy name entered into [facility]:    [id number] - [facility] [address] [facility] [address]  [facility] [place] [address]    Clinical concerns: None. Dr. Fang was notified.    Tere Finnegan MA              "

## 2018-03-22 LAB — LA PPP-IMP: NEGATIVE

## 2018-03-22 NOTE — PROGRESS NOTES
DATE OF VISIT: Mar 20, 2018    REASON FOR REFERRAL: Management of thrombocytopenia.    CHIEF COMPLAINT:   Chief Complaint   Patient presents with     Consult     Thrombocytopenia. Enlarged spleen.       HISTORY OF PRESENT ILLNESS:   28-year-old male patient who presented today for evaluation for thrombus cytopenia.  A routine blood work he was found to have low platelet count at 1 21,000.  The rest of CBC was normal including total white count and hemoglobin.  Patient denies any bruising or bleeding.  Denies any skin rash.  Denies any joint aches or pains.  Denies any history of exposure to hepatitis C or HIV.  Rest of workup including hepatitis C and HIV came back negative.  Repeat CBC again showed platelet count of 116,000.  Patient not currently on any medication.  He drinks alcohol quite a bit.  Usually he drinks on the weekends  10 beers .  patient is here today to discuss further management plan.    REVIEW OF SYSTEMS:   Constitutional: Negative for fever, chills, and night sweats.  Skin: negative.  Eyes: negative.  Ears/Nose/Throat: negative.  Respiratory: No shortness of breath, dyspnea on exertion, cough, or hemoptysis.  Cardiovascular: negative.  Gastrointestinal: negative.  Genitourinary: negative.  Musculoskeletal: negative.  Neurologic: negative.  Psychiatric: negative.  Hematologic/Lymphatic/Immunologic: negative.  Endocrine: negative.    PAST MEDICAL HISTORY:   Past Medical History:   Diagnosis Date     NO ACTIVE PROBLEMS        PAST SURGICAL HISTORY:   Past Surgical History:   Procedure Laterality Date     FINGER SURGERY         ALLERGIES:   Allergies as of 03/20/2018     (No Known Allergies)       MEDICATIONS:   No current outpatient prescriptions on file.        FAMILY HISTORY:   Family History   Problem Relation Age of Onset     Lipids Father      Hypertension Maternal Grandmother      Hypertension Maternal Grandfather      DIABETES Paternal Grandmother      Hypertension Paternal Grandmother       Arthritis Paternal Grandmother      DIABETES Paternal Grandfather      Hypertension Paternal Grandfather      Prostate Cancer Paternal Grandfather      Cancer - colorectal Paternal Grandfather      CANCER Paternal Grandfather      kidney     GASTROINTESTINAL DISEASE Brother      IBS     Asthma No family hx of      C.A.D. No family hx of      CEREBROVASCULAR DISEASE No family hx of      Alcohol/Drug No family hx of      Allergies No family hx of      Alzheimer Disease No family hx of      Anesthesia Reaction No family hx of      Blood Disease No family hx of      Cardiovascular No family hx of      Circulatory No family hx of      Congenital Anomalies No family hx of      Connective Tissue Disorder No family hx of      Depression No family hx of      Eye Disorder No family hx of      Genetic Disorder No family hx of      Genitourinary Problems No family hx of      Gynecology No family hx of      HEART DISEASE No family hx of      Musculoskeletal Disorder No family hx of      Neurologic Disorder No family hx of      Obesity No family hx of      OSTEOPOROSIS No family hx of      Psychotic Disorder No family hx of      Respiratory No family hx of      Thyroid Disease No family hx of      Hearing Loss No family hx of       Family history was reviewed this patient today.    SOCIAL HISTORY:   Social History     Social History     Marital status: Single     Spouse name: N/A     Number of children: N/A     Years of education: N/A     Social History Main Topics     Smoking status: Former Smoker     Smokeless tobacco: Former User     Types: Chew      Comment: E cig      Alcohol use 0.0 oz/week     0 Standard drinks or equivalent per week      Comment: once a week; few drinks     Drug use: No     Sexual activity: Yes     Partners: Female     Birth control/ protection: Condom, Pill     Other Topics Concern     None     Social History Narrative       PHYSICAL EXAMINATION:   /86 (BP Location: Right arm, Patient Position:  "Chair, Cuff Size: Adult Large)  Pulse 86  Temp 97.4  F (36.3  C) (Temporal)  Resp 16  Ht 1.715 m (5' 7.5\")  Wt 86.9 kg (191 lb 8 oz)  SpO2 98%  BMI 29.55 kg/m2  Wt Readings from Last 10 Encounters:   03/20/18 86.9 kg (191 lb 8 oz)   02/22/18 82.9 kg (182 lb 12.8 oz)   02/20/18 84.6 kg (186 lb 6.4 oz)   02/09/18 83 kg (183 lb)   07/11/17 87 kg (191 lb 12.8 oz)   03/13/17 88 kg (194 lb)   01/02/17 86.2 kg (190 lb)   11/22/16 87.1 kg (192 lb)   02/26/16 84.4 kg (186 lb)   01/12/15 83 kg (183 lb)      GENERAL APPEARANCE: Healthy, alert and in no acute distress.  HEENT: Sclerae anicteric. PERRLA. Oropharynx without ulcers, lesions, or thrush.  NECK: Supple. No asymmetry or masses.  LYMPHATICS: No palpable cervical, supraclavicular, axillary, or inguinal lymphadenopathy.  RESP: Lungs clear to auscultation bilaterally without rales, rhonchi or wheezes.  CARDIOVASCULAR: Regular rate and rhythm. Normal S1, S2; no S3 or S4. No murmur, gallop, or rub.  ABDOMEN: Soft, nontender. Bowel sounds normal. No palpable organomegaly or masses.  MUSCULOSKELETAL: Extremities without gross deformities noted. No edema of bilateral lower extremities.  SKIN: No suspicious lesions or rashes.  NEURO: Alert and oriented x 3. Cranial nerves II-XII grossly intact.  PSYCHIATRIC: Mentation and affect appear normal.    LABORATORY RESULTS:  Office Visit on 02/22/2018   Component Date Value Ref Range Status     Copath Report 02/22/2018    Final                    Value:Patient Name: ANABELLA CUNHA  MR#: [id number]  Specimen #: [id number]  Collected: 2/22/2018  Received: 2/23/2018  Reported: 2/26/2018 17:00  Ordering Phy(s): ELIZABETH MALDONADO    For improved result formatting, select 'View Enhanced Report Format' under   Linked Documents section.    TEST(S):  Peripheral Smear Morphology    FINAL DIAGNOSIS:  Peripheral blood morphology:  - Mild thrombocytopenia.    COMMENT:  Thrombocytopenia may be due to increased platelet destruction " (immune   mediated such as idiopathic  thrombocytopenic purpura, SLE, drug induced or due to ITP, TTP, DIC,   infections such as anaplasma), due to  hypersplenism or due to bone marrow suppression (i.e. bone marrow   infiltrative process, myelodysplastic  syndrome, or toxin - such as alcohol, viral or drug induced), among other   causes.    Electronically signed out by:    Jose R Wei M.D.    CLINICAL HISTORY:  27 year old male.  From electronic medical record:  Mild thrombocytopenia   for about 2 years.  O                          n 2/21/2018 CT  of abdomen shows spleen at upper normal limit in size, measuring 12.7 cm   in greatest dimension.  Pelvic  pressure, unable to empty, dysuria, treated for prostatitis without   relief, suprapubic abdominal pain with  ultrasound of pelvis pending.  The patient uses alcohol.    PERIPHERAL BLOOD DATA:  PERIPHERAL BLOOD DATA (Date: 02/22/2018)  Patient Value (Reference Range >18 year old male)  8.66    WBC        (4.0-11.0 x 10*9/L)  4,92    RBC         (4.4-5.9 x 10*12/L)  14.7    HGB         (13.3-17.7 g/dL)  41.8    HCT         (40.0-53.0 %)  85.0    MCV        (78-100fL)  29.9    MCH        (26.5-33.0 pg)  35.2    MCHC      (31.5-36.5 g/dL)  13.3    RDW       (10.0-15.0 %)   116    PLT         (150-450 x 10*9/L)    PERIPHERAL BLOOD DIFFERENTIAL  (Reference ranges >18 year old)    Percent  70.0%  Neutrophils  23.0%  Lymphocytes   5.0%  Monocytes   1.0%  Eosinophils   1.0%  Basophils    Absolute  6.06   Neutrophils (Ref normal 1.6 - 8.3 x 10*9/L)  1.99   Lymphocytes  (Ref                           normal 0.8 - 5.3 x 10*9/L)  0.43   Monocytes  (Ref normal 0 -1.3 x 10*9/L)  0.09   Eosinophils  (Ref normal 0 - 0.7 x 10*9/L)  0.09   Basophils  (Ref normal 0 - 0.2 x 10*9/L)    PERIPHERAL BLOOD MORPHOLOGY:    ERYTHROCYTES:  The red cells are normocytic, normochromic and normal in   number for the patient's age and  gender. No significant anisopoikilocytosis is seen. No  features of   hemolysis or increased polychromasia are  identified.  No intracellular microorganisms are identified.    LEUKOCYTES:  The leukocytes are normal in number, morphology and   differential distribution, except some of the  lymphocytes appear reactive. No immature precursors or evidence of   neutrophilic dysplasia is seen. No atypical  lymphoid cells are seen. No intracellular microorganisms are identified.    PLATELETS:  The platelets are mildly decreased in number and are normal   morphology.  Some of the platelets are  enlarged.    CPT Codes:  A: 75140-OTKD    TESTING LAB LOCATION:  [address] [address] [phone]    COLLECTION SITE:  Client:  [facility]  Location:  [facility] (P)       WBC 02/22/2018 8.7  4.0 - 11.0 10e9/L Final     RBC Count 02/22/2018 4.92  4.4 - 5.9 10e12/L Final     Hemoglobin 02/22/2018 14.7  13.3 - 17.7 g/dL Final     Hematocrit 02/22/2018 41.8  40.0 - 53.0 % Final     MCV 02/22/2018 85  78 - 100 fl Final     MCH 02/22/2018 29.9  26.5 - 33.0 pg Final     MCHC 02/22/2018 35.2  31.5 - 36.5 g/dL Final     RDW 02/22/2018 13.3  10.0 - 15.0 % Final     Platelet Count 02/22/2018 116* 150 - 450 10e9/L Final     Diff Method 02/22/2018 Automated Method   Final     % Neutrophils 02/22/2018 69.8  % Final     % Lymphocytes 02/22/2018 21.1  % Final     % Monocytes 02/22/2018 7.4  % Final     % Eosinophils 02/22/2018 1.4  % Final     % Basophils 02/22/2018 0.3  % Final     Absolute Neutrophil 02/22/2018 6.0  1.6 - 8.3 10e9/L Final     Absolute Lymphocytes 02/22/2018 1.8  0.8 - 5.3 10e9/L Final     Absolute Monocytes 02/22/2018 0.6  0.0 - 1.3 10e9/L Final     Absolute Eosinophils 02/22/2018 0.1  0.0 - 0.7 10e9/L Final     Absolute Basophils 02/22/2018 0.0  0.0 - 0.2 10e9/L Final     % Retic 02/22/2018 2.1* 0.5 - 2.0 % Final     Absolute Retic 02/22/2018 104.3* 25 - 95  10e9/L Final       IMAGING RESULTS:  Recent Results (from the past 744 hour(s))   CT Abdomen Pelvis w Contrast    Narrative    CT ABDOMEN AND PELVIS WITH CONTRAST  2/21/2018 2:24 PM    HISTORY:  Pelvic pressure, unable to empty. Treated for prostatitis  with no relief. Dysuria. Suprapubic abdominal pain.    TECHNIQUE: Scans obtained from the diaphragm through the pelvis with  oral and IV contrast, 90 mL Isovue-370.  Radiation dose for this scan  was reduced using automated exposure control, adjustment of the mA  and/or kV according to patient size, or iterative reconstruction  technique.    COMPARISON:  None.    FINDINGS: Visualized portions of the lung bases and mediastinal  contents are unremarkable.  There are no aggressive osseous lesions.      The spleen is upper normal limits for size measuring 12.7 x 9.0 x 11.3  cm in the AP, transverse and craniocaudal dimensions, respectively.  The gallbladder is partially decompressed. The liver, gallbladder,  pancreas, spleen, bilateral adrenal glands and bilateral kidneys  otherwise enhance normally. There is a mildly prominent right  extrarenal pelvis. No hydronephrosis, nephrolithiasis, hydroureter or  ureteral calculus is identified. The urinary bladder is grossly  unremarkable.    Prostate gland contains a few dystrophic calcifications but is  otherwise unremarkable. No obvious inflammatory changes are seen  around the prostate.    The colon is of normal caliber without pericolonic inflammatory change  to suggest acute diverticulitis. Appendix extends retrocecally into  the right side of the pelvis and terminates near the right sacroiliac  joint. It is grossly within normal limits. The small bowel is of  normal caliber. The stomach contains ingested material but is  otherwise unremarkable. Oral contrast agent is seen in the small  bowel.    Aorta is grossly normal in appearance. No adenopathy, free fluid or  free air is seen in the peritoneal cavity.      Impression     IMPRESSION:  1. Spleen is upper normal limits for size.  2. 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  3. No other significant abnormalities are identified. No etiology for  patient's symptoms is seen.    ANJELICA HUSSEIN MD   US Abdomen Limited    Narrative    ULTRASOUND ABDOMEN LIMITED February 27, 2018 8:46 AM     HISTORY: Mildly enlarged spleen on CT scan, chronic thrombocytopenia.  Spleen enlargement.     COMPARISON: CT 2/21/2018, ultrasound 1/5/2017.      Impression    IMPRESSION: Mild splenomegaly with a length of 13.9 cm, not changed  significantly from January 2017.    LOI HERNANDEZ MD       ASSESSMENT AND PLAN:    (D69.6) Thrombocytopenia (H)  (primary encounter diagnosis)  I reviewed with patient causes for thrombocytopenia.  Today I will check  CBC with platelets differential, Comprehensive metabolic panel, Lactate Dehydrogenase, Iron and iron binding capacity, Ferritin, Vitamin  B12, Lupus Anticoagulant Panel.  I explained to the patient that the patient platelet count could be related to excessive alcohol drinking.  I emphasized the importance of quitting alcohol.  Other differential diagnoses include immune thrombocytopenia.  We will continue to monitor the patient platelet count I will see him again in 1 months or sooner if there are new developments or concerns.    (R16.1) Spleen enlargement  Spleen is moderately elevated on the CT scan.  We will continue to monitor.      The patient is ready to learn, no apparent learning barriers were identified, Diagnosis and treatment plans were explained to the patient. The patient expressed understanding of the content. The patient questions were answered to his satisfaction.    Shari Fang MD    Chart documentation with Dragon Voice recognition Software. Although reviewed after completion, some words and grammatical errors may remain.

## 2018-04-24 DIAGNOSIS — D69.6 THROMBOCYTOPENIA (H): ICD-10-CM

## 2018-04-24 LAB
BASOPHILS # BLD AUTO: 0 10E9/L (ref 0–0.2)
BASOPHILS NFR BLD AUTO: 0.3 %
DIFFERENTIAL METHOD BLD: ABNORMAL
EOSINOPHIL # BLD AUTO: 0.2 10E9/L (ref 0–0.7)
EOSINOPHIL NFR BLD AUTO: 1.8 %
ERYTHROCYTE [DISTWIDTH] IN BLOOD BY AUTOMATED COUNT: 13.2 % (ref 10–15)
HCT VFR BLD AUTO: 37.8 % (ref 40–53)
HGB BLD-MCNC: 13.5 G/DL (ref 13.3–17.7)
LYMPHOCYTES # BLD AUTO: 2.5 10E9/L (ref 0.8–5.3)
LYMPHOCYTES NFR BLD AUTO: 29 %
MCH RBC QN AUTO: 30.3 PG (ref 26.5–33)
MCHC RBC AUTO-ENTMCNC: 35.7 G/DL (ref 31.5–36.5)
MCV RBC AUTO: 85 FL (ref 78–100)
MONOCYTES # BLD AUTO: 0.6 10E9/L (ref 0–1.3)
MONOCYTES NFR BLD AUTO: 6.8 %
NEUTROPHILS # BLD AUTO: 5.4 10E9/L (ref 1.6–8.3)
NEUTROPHILS NFR BLD AUTO: 62.1 %
PLATELET # BLD AUTO: 124 10E9/L (ref 150–450)
RBC # BLD AUTO: 4.46 10E12/L (ref 4.4–5.9)
WBC # BLD AUTO: 8.7 10E9/L (ref 4–11)

## 2018-04-24 PROCEDURE — 36415 COLL VENOUS BLD VENIPUNCTURE: CPT | Performed by: INTERNAL MEDICINE

## 2018-04-24 PROCEDURE — 85025 COMPLETE CBC W/AUTO DIFF WBC: CPT | Performed by: INTERNAL MEDICINE

## 2018-05-22 ENCOUNTER — ONCOLOGY VISIT (OUTPATIENT)
Dept: ONCOLOGY | Facility: CLINIC | Age: 28
End: 2018-05-22
Payer: COMMERCIAL

## 2018-05-22 VITALS
OXYGEN SATURATION: 99 % | SYSTOLIC BLOOD PRESSURE: 116 MMHG | TEMPERATURE: 97.7 F | WEIGHT: 193 LBS | DIASTOLIC BLOOD PRESSURE: 80 MMHG | HEART RATE: 93 BPM | RESPIRATION RATE: 16 BRPM | BODY MASS INDEX: 29.78 KG/M2

## 2018-05-22 DIAGNOSIS — R16.1 SPLEEN ENLARGEMENT: ICD-10-CM

## 2018-05-22 DIAGNOSIS — D69.6 THROMBOCYTOPENIA (H): Primary | ICD-10-CM

## 2018-05-22 LAB
BASOPHILS # BLD AUTO: 0 10E9/L (ref 0–0.2)
BASOPHILS NFR BLD AUTO: 0.5 %
DIFFERENTIAL METHOD BLD: ABNORMAL
EOSINOPHIL # BLD AUTO: 0.1 10E9/L (ref 0–0.7)
EOSINOPHIL NFR BLD AUTO: 2.2 %
ERYTHROCYTE [DISTWIDTH] IN BLOOD BY AUTOMATED COUNT: 13.8 % (ref 10–15)
HCT VFR BLD AUTO: 38.7 % (ref 40–53)
HGB BLD-MCNC: 13.7 G/DL (ref 13.3–17.7)
IMM GRANULOCYTES # BLD: 0.1 10E9/L (ref 0–0.4)
IMM GRANULOCYTES NFR BLD: 0.8 %
LYMPHOCYTES # BLD AUTO: 2 10E9/L (ref 0.8–5.3)
LYMPHOCYTES NFR BLD AUTO: 30.6 %
MCH RBC QN AUTO: 30 PG (ref 26.5–33)
MCHC RBC AUTO-ENTMCNC: 35.4 G/DL (ref 31.5–36.5)
MCV RBC AUTO: 85 FL (ref 78–100)
MONOCYTES # BLD AUTO: 0.6 10E9/L (ref 0–1.3)
MONOCYTES NFR BLD AUTO: 8.6 %
NEUTROPHILS # BLD AUTO: 3.7 10E9/L (ref 1.6–8.3)
NEUTROPHILS NFR BLD AUTO: 57.3 %
PLATELET # BLD AUTO: 121 10E9/L (ref 150–450)
RBC # BLD AUTO: 4.57 10E12/L (ref 4.4–5.9)
WBC # BLD AUTO: 6.5 10E9/L (ref 4–11)

## 2018-05-22 PROCEDURE — 36415 COLL VENOUS BLD VENIPUNCTURE: CPT | Performed by: INTERNAL MEDICINE

## 2018-05-22 PROCEDURE — 99213 OFFICE O/P EST LOW 20 MIN: CPT | Performed by: INTERNAL MEDICINE

## 2018-05-22 PROCEDURE — 85025 COMPLETE CBC W/AUTO DIFF WBC: CPT | Performed by: INTERNAL MEDICINE

## 2018-05-22 RX ORDER — VARENICLINE TARTRATE 1 MG/1
1 TABLET, FILM COATED ORAL 2 TIMES DAILY
COMMUNITY
End: 2019-11-26

## 2018-05-22 ASSESSMENT — PAIN SCALES - GENERAL: PAINLEVEL: NO PAIN (0)

## 2018-05-22 NOTE — LETTER
5/22/2018         RE: Shlomo Nelson  [address]  [place] [facility] [address]        Dear Colleague,    Thank you for referring your patient, Shlomo Nelson, to the [facility]. Please see a copy of my visit note below.    DATE OF VISIT: May 22, 2018    Shlomo Nelson is a 28 year old male is seen today for   Chief Complaint   Patient presents with     Hematology     Thrombocytopenia     Results     CBC   .       (D69.6) Thrombocytopenia (H)  (primary encounter diagnosis)  (R16.1) Spleen enlargement  I reviewed with the patient today most recent laboratory test.  Most recent platelet count is 124.  This is higher from previously in February when it was 116.  The patient denies any bruising or bleeding.  The patient denies any weight loss or night sweats or fever or chills.  Is not currently on medication other than Chantix.  He is trying to quit smoking.  He is trying also to decrease his alcohol intake.  I told the patient that his thrombocytopenia probably related to immune thrombocytopenia.  I would recommend to continue to monitor platelet count every 3 months.  I will see the patient again in 6 months or sooner if there are new developments or concerns.    The patient is ready to learn, no apparent learning barriers were identified.  Diagnosis and treatment plans were explained to the patient. The patient expressed understanding of the content. The patient asked appropriate questions. The patient questions were answered to his satisfaction.  Time spent 15 minutes more than 50% of the time in counseling and coordination of care including discussion of differential diagnosis of thrombocytopenia, management follow-up and prognosis.  Chart documentation with Dragon Voice recognition Software. Although reviewed after completion, some words and grammatical errors may remain.    Again, thank you for allowing me to participate in the care of your patient.        Sincerely,        Shari Fang,  MD

## 2018-05-22 NOTE — PROGRESS NOTES
DATE OF VISIT: May 22, 2018    Shlomo Nelson is a 28 year old male is seen today for   Chief Complaint   Patient presents with     Hematology     Thrombocytopenia     Results     CBC   .       (D69.6) Thrombocytopenia (H)  (primary encounter diagnosis)  (R16.1) Spleen enlargement  I reviewed with the patient today most recent laboratory test.  Most recent platelet count is 124.  This is higher from previously in February when it was 116.  The patient denies any bruising or bleeding.  The patient denies any weight loss or night sweats or fever or chills.  Is not currently on medication other than Chantix.  He is trying to quit smoking.  He is trying also to decrease his alcohol intake.  I told the patient that his thrombocytopenia probably related to immune thrombocytopenia.  I would recommend to continue to monitor platelet count every 3 months.  I will see the patient again in 6 months or sooner if there are new developments or concerns.    The patient is ready to learn, no apparent learning barriers were identified.  Diagnosis and treatment plans were explained to the patient. The patient expressed understanding of the content. The patient asked appropriate questions. The patient questions were answered to his satisfaction.  Time spent 15 minutes more than 50% of the time in counseling and coordination of care including discussion of differential diagnosis of thrombocytopenia, management follow-up and prognosis.  Chart documentation with Dragon Voice recognition Software. Although reviewed after completion, some words and grammatical errors may remain.

## 2018-05-22 NOTE — MR AVS SNAPSHOT
After Visit Summary   5/22/2018    Shlomo Nelson    MRN: [id number]           Patient Information     Date Of Birth          1990        Visit Information        Provider Department      5/22/2018 4:00 PM Shari Fang MD [facility]        Today's Diagnoses     Thrombocytopenia (H)    -  1    Spleen enlargement           Follow-ups after your visit        Follow-up notes from your care team     Return in about 6 months (around 11/22/2018).      Your next 10 appointments already scheduled     Aug 22, 2018  4:30 PM CDT   LAB with [facility] LAB [facility]    [address] [facility] [address]   [phone]           Please do not eat 10-12 hours before your appointment if you are coming in fasting for labs on lipids, cholesterol, or glucose (sugar). This does not apply to pregnant women. Water, hot tea and black coffee (with nothing added) are okay. Do not drink other fluids, diet soda or chew gum.            Nov 27, 2018  8:00 AM CST   Return Visit with Shari Fang MD   [facility]    [address] [facility] [id number] [phone]              Future tests that were ordered for you today     Open Future Orders        Priority Expected Expires Ordered    CBC with platelets differential Routine 11/22/2018 5/22/2019 5/22/2018            Who to contact     If you have questions or need follow up information about today's clinic visit or your schedule please contact [facility] directly at [phone].  Normal or non-critical lab and imaging results will be communicated to you by MyChart, letter or phone within 4 business days after the clinic has received the results. If you do not hear from us within 7 days, please contact the clinic through MyChart or phone. If you have a critical or abnormal lab result, we will notify you by phone as soon as  possible.  Submit refill requests through [employer] or call your pharmacy and they will forward the refill request to us. Please allow 3 business days for your refill to be completed.          Additional Information About Your Visit        [employer] Information     [employer] gives you secure access to your electronic health record. If you see a primary care provider, you can also send messages to your care team and make appointments. If you have questions, please call your primary care clinic.  If you do not have a primary care provider, please call [phone] and they will assist you.        Care EveryWhere ID     This is your Care EveryWhere ID. This could be used by other organizations to access your [place] medical records  [id number]        Your Vitals Were     Pulse Temperature Respirations Pulse Oximetry BMI (Body Mass Index)       93 97.7  F (36.5  C) (Temporal) 16 99% 29.78 kg/m2        Blood Pressure from Last 3 Encounters:   05/22/18 116/80   03/20/18 135/86   03/01/18 118/78    Weight from Last 3 Encounters:   05/22/18 87.5 kg (193 lb)   03/20/18 86.9 kg (191 lb 8 oz)   02/22/18 82.9 kg (182 lb 12.8 oz)              We Performed the Following     CBC with platelets differential        Primary Care Provider Office Phone # Fax #    Xiomara ROBERTA Moralez PA-C [phone]       [address] [facility] [facility] [address]  [facility] [place] [facility] [address]        Equal Access to Services     [facility] : Hadii aad ku hadasho Soomaali, waaxda luqadaha, qaybta kaalmada adeegyada, berta last . So [facility] [phone].    ATENCIÓN: Si habla español, tiene a grissom disposición servicios gratuitos de asistencia lingüística. Mallory al [phone].    We comply with applicable federal civil rights laws and [place] laws. We do not discriminate on the basis of race, color, national origin, age, disability, sex, sexual orientation, or gender identity.            Thank you!     Thank you for choosing  [facility]  for your care. Our goal is always to provide you with excellent care. Hearing back from our patients is one way we can continue to improve our services. Please take a few minutes to complete the written survey that you may receive in the mail after your visit with us. Thank you!             Your Updated Medication List - Protect others around you: Learn how to safely use, store and throw away your medicines at www.disposemymeds.org.          This list is accurate as of 5/22/18  5:18 PM.  Always use your most recent med list.                   Brand Name Dispense Instructions for use Diagnosis    CHANTIX 1 MG tablet   Generic drug:  varenicline      Take 1 mg by mouth 2 times daily

## 2018-05-22 NOTE — NURSING NOTE
"Oncology Rooming Note    May 22, 2018 3:36 PM   Shlomo Nelson is a 28 year old male who presents for:    Chief Complaint   Patient presents with     Hematology     Thrombocytopenia     Results     CBC     Initial Vitals: /80  Pulse 93  Temp 97.7  F (36.5  C) (Temporal)  Resp 16  Wt 193 lb (87.5 kg)  SpO2 99%  BMI 29.78 kg/m2 Estimated body mass index is 29.78 kg/(m^2) as calculated from the following:    Height as of 3/20/18: 5' 7.5\" (1.715 m).    Weight as of this encounter: 193 lb (87.5 kg). Body surface area is 2.04 meters squared.  No Pain (0) Comment: Data Unavailable   No LMP for male patient.  Allergies reviewed: Yes  Medications reviewed: Yes    Medications: Medication refills not needed today.  Pharmacy name entered into [facility]:    [facility] [address] [facility] [address] [facility] [address] [facility] [address] [facility]    Nausea, Vomiting: No  Fever/Chills:  No  Mouth Sores: No  Diarrhea/Constipation: No  Urination: No Concerns  Skin/Excessive dryness: No Concerns  Cracking, Peeling: No  Unusual Bruising/Bleeding: No  Respiratory/SOB: No Concerns  Neuropathy/Numbness&Tingling-Hands/Feet: No  Cognitive Disturbance-Memory: No  Sleep Concerns: No Concerns  Night Sweats:  No  Fatigue/Weakness: No  Light Headed or Dizzy: No  Appetite:  No Concerns  Able to drink 6 to 8 glasses of fluid: No Concerns  Sexuality: No Concerns      Clinical concerns: none  provider  was notified.    5 minutes for nursing intake (face to face time)     Gisell/FARNAZ                 "

## 2018-05-24 NOTE — NURSING NOTE
DISCHARGE PLAN:  Next appointments: See patient instruction section  Departure Mode: unknown  Accompanied by: unknown  0 minutes for nursing discharge (face to face time)     Shlomo Nelson is here today for Hematology follow up.  Patient was not seen by writing nurse at time of appointment. Patient went to  and scheduled appointments.  See patient instructions and Oncologist's Progress note for further details. Questions and concerns addressed to patient's satisfaction. Patient verbalized and demonstrated understanding of plan.  Contact information provided and patient is encouraged to call with any that arise in the interim of care.    Joel Monique, RN, BSN, OCN   Oncology Care Coordinator RN  [facility]  [phone]  5/24/2018, 6:35 PM

## 2018-08-10 NOTE — PROGRESS NOTES
SUBJECTIVE:   Shlomo Nelson is a 28 year old male who presents to clinic today for the following health issues:    HPI  Acute Illness   Acute illness concerns: nasal and ear issues  Onset: 2 weeks peaked on friday    Fever: no    Chills/Sweats: no    Headache (location?): no    Sinus Pressure:no    Conjunctivitis:  no    Ear Pain: YES: both more on left    Rhinorrhea: no     Congestion: no     Sore Throat: no; just un-clearable, but nothing there     Cough: no    Wheeze: YES- with deep breaths    Decreased Appetite: no    Nausea: no    Vomiting: no    Diarrhea:  no    Dysuria/Freq.: no    Fatigue/Achiness: no    Sick/Strep Exposure: no     Therapies Tried and outcome: Mucinex- helped for a little bit.   - Worried has throat cancer because chewed for so long (10 years)     He has other concerns he would like to address with provider only.  - when 13 had major panic attack   - last week nevarez one and thought was dying, total break down     Wife calmed him down   - Worrying a lot   - Struggling for 2 weeks   - Son born on 7/23/18   -[place] thoughts that spiral   - Always thought could be stronger than mental illness     Abnormal Mood Symptoms  Onset: Years     Description:   Depression: no  Anxiety: YES    Accompanying Signs & Symptoms:  Still participating in activities that you used to enjoy: YES  Fatigue: no  Irritability: YES  Difficulty concentrating: YES  Changes in appetite: YES  Problems with sleep: YES  Heart racing/beating fast : YES  Thoughts of hurting yourself or others: none    History:   Recent stress: no  Prior depression hospitalization: None  Family history of depression: Yes - mom, was hospitalized    Family history of anxiety: YES - mom    Precipitating factors:   Alcohol/drug use: no    Alleviating factors:  Deep breathing   Therapies Tried and outcome: None        Problem list and histories reviewed & adjusted, as indicated.  Additional history: as documented    ROS:  Constitutional, HEENT,  cardiovascular, pulmonary, gi and gu systems are negative, except as otherwise noted.    OBJECTIVE:   /80  Pulse 90  Temp 97.8  F (36.6  C) (Temporal)  Resp 18  Wt 191 lb 9.6 oz (86.9 kg)  SpO2 98%  BMI 29.57 kg/m2  Body mass index is 29.57 kg/(m^2).  GENERAL APPEARANCE: mild-moderately ill appearing, alert and no distress  EYES: Eyes grossly normal to inspection, PERRLA, conjunctivae and sclerae without injection or discharge, EOM intact   HENT: Bilateral ear canals without erythema or cerumen, bilateral TM's mildly erythematous with clear effusion and injection (L>R), no bulging, nasal turbinates with severe swelling & erythema, yellow green discharge, mouth without ulcers or lesions, oropharynx mildly erythematous without edema or exudate, oral mucous membranes moist, bilateral maxillary and frontal sinus tenderness (left > right)    NECK: Bilateral anterior cervical adenopathy, no adenopathy in posterior cervical or supraclavicular regions  RESP: Lungs clear to auscultation - no rales, rhonchi or wheezes   CV: Regular rates and rhythm, normal S1 S2, no S3 or S4, no murmur, click or rub  MS: No musculoskeletal defects are noted and gait is age appropriate without ataxia   SKIN: No suspicious lesions or rashes, hydration status appears adeuqate with normal skin turgor   PSYCH: Alert and oriented x3; speech- coherent , normal rate and volume though pressured at times; able to articulate logical thoughts, able to abstract reason, no tangential thoughts, no hallucinations or delusions, mentation appears normal, Mood is euthymic. Affect is anxious. Thought content is free of suicidal ideation, hallucinations, and delusions. Dress is adequate and upkept. Eye contact is good during conversation.       Diagnostic Test Results:  none     ASSESSMENT/PLAN:       ICD-10-CM    1. Acute sinusitis with symptoms > 10 days J01.90 amoxicillin-clavulanate (AUGMENTIN) 875-125 MG per tablet   2. GENARO (generalized anxiety  disorder) F41.1 sertraline (ZOLOFT) 50 MG tablet     1. Acute sinusitis   - Exam findings consistent with infection   - Discussed antibiotic use, duration, and side effects  - OK to continue OTC medications as helpful   - Advised rest and fluids   - Hand out given     2. GENARO   - New diagnosis, unclear if lifelong or situational   - Discussed SSRI therapies, use and side effects   - Start Zoloft - 1/2 tablet (25 mg) for 2 weeks, then increase to full tablet (50 mg)     Discussed use and side effects   - Offered counseling, patient declined   - Recheck 1 month   - Hand out given      The patient indicates understanding of these issues and agrees with the plan.    Follow up: 1 month         Xiomara Moralez PA-C  [facility]

## 2018-08-13 ENCOUNTER — OFFICE VISIT (OUTPATIENT)
Dept: FAMILY MEDICINE | Facility: OTHER | Age: 28
End: 2018-08-13
Payer: COMMERCIAL

## 2018-08-13 VITALS
DIASTOLIC BLOOD PRESSURE: 80 MMHG | HEART RATE: 90 BPM | RESPIRATION RATE: 18 BRPM | BODY MASS INDEX: 29.57 KG/M2 | SYSTOLIC BLOOD PRESSURE: 128 MMHG | WEIGHT: 191.6 LBS | OXYGEN SATURATION: 98 % | TEMPERATURE: 97.8 F

## 2018-08-13 DIAGNOSIS — J01.90 ACUTE SINUSITIS WITH SYMPTOMS > 10 DAYS: Primary | ICD-10-CM

## 2018-08-13 DIAGNOSIS — F41.1 GAD (GENERALIZED ANXIETY DISORDER): ICD-10-CM

## 2018-08-13 PROCEDURE — 99214 OFFICE O/P EST MOD 30 MIN: CPT | Performed by: PHYSICIAN ASSISTANT

## 2018-08-13 ASSESSMENT — ANXIETY QUESTIONNAIRES
7. FEELING AFRAID AS IF SOMETHING AWFUL MIGHT HAPPEN: NEARLY EVERY DAY
6. BECOMING EASILY ANNOYED OR IRRITABLE: NOT AT ALL
5. BEING SO RESTLESS THAT IT IS HARD TO SIT STILL: NOT AT ALL
3. WORRYING TOO MUCH ABOUT DIFFERENT THINGS: MORE THAN HALF THE DAYS
2. NOT BEING ABLE TO STOP OR CONTROL WORRYING: MORE THAN HALF THE DAYS
1. FEELING NERVOUS, ANXIOUS, OR ON EDGE: SEVERAL DAYS
GAD7 TOTAL SCORE: 9

## 2018-08-13 ASSESSMENT — PATIENT HEALTH QUESTIONNAIRE - PHQ9: 5. POOR APPETITE OR OVEREATING: SEVERAL DAYS

## 2018-08-13 ASSESSMENT — PAIN SCALES - GENERAL: PAINLEVEL: MILD PAIN (2)

## 2018-08-13 NOTE — PATIENT INSTRUCTIONS
1. Sinus infection       - Augmentin twice a day for 10 days       - Afrin for 3 days       - Sudafed as needed       - Mucinex as needed    2. Mood   - Start Zoloft - 1/2 tablet (25 mg) for 2 weeks, then increase to full tablet (50 mg) Take first thing in the morning   - Recheck 1 month                         Sinusitis           What is sinusitis?   Sinusitis is swollen, infected linings of the sinuses. The sinuses are hollow spaces in the bones of your face and skull. They connect with the nose through small openings. Like the nose, their linings make mucus.   How does it occur?   Sinusitis occurs when the sinus linings become infected. The passageways from the sinuses to the nose are very narrow. Swelling and mucus may block the passageways. This leads to pressure changes in the sinuses that can be painful.   A number of things can cause swelling and sinusitis. Most often it's allergens (things that cause allergies, like pollen and mold) and viruses, such as viruses that cause the common cold. Whether the cause is allergies or a virus, the sinus linings can swell. When swelling causes the sinus passageway to swell shut, bacteria, viruses, and even fungus can be trapped in the sinuses and cause a sinus infection.   If your nasal bones have been injured or are deformed, causing partial blockage of the sinus openings, you are more likely to get sinusitis.   What are the symptoms?   Symptoms include:   feeling of fullness or pressure in your head   a headache that is most painful when you first wake up in the morning or when you bend your head down or forward   pain above or below your eyes   aching in the upper jaw and teeth   runny or stuffy nose   cough, especially at night   fluid draining down the back of your throat (postnasal drainage)   sore throat in the morning or evening.   How is it diagnosed?   Your healthcare provider will ask about your symptoms and will examine you. You may have an X-ray to look  for swelling, fluid, or small benign growths (polyps) in the sinuses.   How is it treated?   Decongestants may help. They may be nonprescription or prescription. They are available as liquids, pills, and nose sprays.   Your healthcare provider may prescribe an antibiotic. In some cases you may need to take decongestants and antibiotics for several weeks.   You may need nonprescription medicine for pain, such as acetaminophen or ibuprofen. Check with your healthcare provider before you give any medicine that contains aspirin or salicylates to a child or teen. This includes medicines like baby aspirin, some cold medicines, and Pepto Bismol. Children and teens who take aspirin are at risk for a serious illness called Reye's syndrome. Ibuprofen is an NSAID. Nonsteroidal anti-inflammatory medicines (NSAIDs) may cause stomach bleeding and other problems. These risks increase with age. Read the label and take as directed. Unless recommended by your healthcare provider, do not take NSAIDs for more than 10 days for any reason.   If you have chronic or repeated sinus infections, allergies may be the cause. Your healthcare provider may prescribe antihistamine tablets or prescription nasal sprays (steroids or cromolyn) to treat the allergies.   If you have chronic, severe sinusitis that does not respond to treatment with medicines, surgery may be done. The surgeon can create an extra or enlarged passageway in the wall of the sinus cavity. This allows the sinuses to drain more easily through the nasal passages. This should help them stay free of infection.   How long will the effects last?   Symptoms may get better gradually over 3 to 10 days. Depending on what caused the sinusitis and how severe it is, it may last for days or weeks. The symptoms may come back if you do not finish all of your antibiotic.   How can I take care of myself?   Follow your healthcare provider's instructions.   If you are taking an antibiotic, take all  of it as directed by your provider. If you stop taking the medicine when your symptoms are gone but before you have taken all of the medicine, symptoms may come back.   Avoid tobacco smoke.   If you have allergies, take care to avoid the things you are allergic to, such as animal dander.   Add moisture to the air with a humidifier or a vaporizer, unless you have mold allergy (mold may grow in your vaporizer).   Inhale steam from a basin of hot water or shower to help open your sinuses and relieve pain.   Use saline nasal sprays to help wash out nasal passages and clear some mucus from the airways.   Use decongestants as directed on the label or by your provider.   If you are using a nonprescription nasal-spray decongestant, generally you should not use it for more than 3 days. After 3 days it may cause your symptoms to get worse. Ask your healthcare provider if it is OK for you to use a nasal spray decongestant longer than this.   Get plenty of rest.   Drink more fluids to keep the mucus as thin as possible so your sinuses can drain more easily.   Put warm compresses on painful areas.   Take antibiotics as prescribed. Use all of the medicine, even after you feel better. Some sinus infections require 2 to 4 weeks of antibiotic treatment.   See your healthcare provider if the pain lasts for several days or gets worse.   If the sinus areas above or below your eyes are swollen or bulging, see your healthcare provider right away. This symptom may mean that the infection is spreading. A spreading infection can affect other parts of your body--even the brain--and needs to be treated promptly.   How can I help prevent sinusitis?   Treat your colds and allergies promptly. Use decongestants as soon as you start having symptoms.   Do not smoke and stay away from secondhand smoke.   Drink lots of fluids to keep the mucus thin.   Humidify your home if the air is particularly dry.   If you have sinus infections often, consider  "having allergy tests.   If sinusitis continues to be a problem despite treatment, you might need an exam by an ear, nose, and throat doctor (called an ENT or otolaryngologist). The specialist will check for polyps or a deformed bone that may be blocking your sinuses.     Published by [employer].  This content is reviewed periodically and is subject to change as new health information becomes available. The information is intended to inform and educate and is not a replacement for medical evaluation, advice, diagnosis or treatment by a healthcare professional.   Developed by [employer].   ? 2010 [employer] and/or its affiliates. All Rights Reserved.   Copyright   [employer] 2011  Adult Health Advisor                       Generalized Anxiety Disorder  What is generalized anxiety disorder?   Generalized anxiety disorder (GENARO) is a condition in which a person worries excessively and unrealistically. They may also be jittery, restless, or dizzy. When these symptoms last for at least 6 months, a diagnosis of GENARO may be made.  GENARO may exist by itself, or with both anxiety and depression. It is estimated that almost 5% of people have had this disorder during their lives.  How does it occur?   The cause of GENARO is unknown. Genetic and environmental factors play a role. Women have GENARO about twice as often as men.  The worry in GENARO is not about panic attacks or being afraid in public places. It is typically \"free-floating\" anxiety out of proportion to any real life situation. The worrying can interfere with normal day-to-day activities and work or school.  What are the symptoms?   Symptoms include excessive, unrealistic, and uncontrollable worrying about many things such as:  the state of the world   the economy   violence in society   your job   the bills   chores   family members  Physical symptoms such as muscle tension, sleep problems, or feeling on edge usually go along with anxiety. A person may be " short-tempered and unable to focus or concentrate because of the worrying. Other symptoms include sweating, shaking, having a very fast heartbeat, feeling out of breath, needing to go to the bathroom often and feeling like fainting. People with GENARO may be uneasy in a group or in a waiting room.  How is it diagnosed?   There is no lab test for GENARO. Your healthcare provider or therapist will ask about your symptoms. He or she will make sure you do not have a medical illness or drug or alcohol problem that could cause the symptoms. Some medicines can cause anxiety or make it worse. These include asthma medicines, stimulants, and steroids such as prednisone.  If you have had the symptoms for at least 6 months, if you have had to cut back on your activities, and if you find it difficult to get things done, you may be diagnosed with generalized anxiety disorder.  How is it treated?   Different types of approaches have proven helpful in treating GENARO. These include medicine, behavior therapy, relaxation therapy, cognitive therapy, and stress management techniques. Which treatments your healthcare provider or therapist uses may depend upon how much the disorder interferes with your day-to-day life.  Several types of medicines can help treat GENARO. Your healthcare provider will work with you to carefully select the best one for you.  How long will the effects last?   GENARO can last many years and sometimes an entire lifetime.   How can I take care of myself?   Get support. Talk with family and friends. Consider joining a support group in your area. Go to a stress management class in your local community.   Learn to manage stress. Ask for help at home and work when the load is too great to handle. Find ways to relax, for example take up a hobby, listen to music, watch movies, take walks. Try deep breathing exercises when you feel stressed.   Take care of your physical health. Try to get at least 7 to 9 hours of sleep each night.  Eat a healthy diet. Limit caffeine. If you smoke, quit. Avoid alcohol and drugs, because they can make your symptoms worse. Exercise according to your healthcare provider's instructions.   Check your medicines. To help prevent problems, tell your healthcare provider and pharmacist about all the medicines, natural remedies, vitamins, and other supplements that you take.   Contact your healthcare provider or therapist if you have any questions or your symptoms seem to be getting worse.  You may also want to contact [employer] (formerly the National Mental Health Association or NMHA). NM's toll-free Information Center number is 9-371-578-[facility]. Its web site address is http://www.NMHA.org

## 2018-08-13 NOTE — MR AVS SNAPSHOT
After Visit Summary   8/13/2018    Shlomo Nelson    MRN: [id number]           Patient Information     Date Of Birth          1990        Visit Information        Provider Department      8/13/2018 9:30 AM Xiomara Moralez PA-C [facility]        Today's Diagnoses     Acute sinusitis with symptoms > 10 days    -  1    GENARO (generalized anxiety disorder)          Care Instructions      1. Sinus infection       - Augmentin twice a day for 10 days       - Afrin for 3 days       - Sudafed as needed       - Mucinex as needed    2. Mood   - Start Zoloft - 1/2 tablet (25 mg) for 2 weeks, then increase to full tablet (50 mg) Take first thing in the morning   - Recheck 1 month                         Sinusitis           What is sinusitis?   Sinusitis is swollen, infected linings of the sinuses. The sinuses are hollow spaces in the bones of your face and skull. They connect with the nose through small openings. Like the nose, their linings make mucus.   How does it occur?   Sinusitis occurs when the sinus linings become infected. The passageways from the sinuses to the nose are very narrow. Swelling and mucus may block the passageways. This leads to pressure changes in the sinuses that can be painful.   A number of things can cause swelling and sinusitis. Most often it's allergens (things that cause allergies, like pollen and mold) and viruses, such as viruses that cause the common cold. Whether the cause is allergies or a virus, the sinus linings can swell. When swelling causes the sinus passageway to swell shut, bacteria, viruses, and even fungus can be trapped in the sinuses and cause a sinus infection.   If your nasal bones have been injured or are deformed, causing partial blockage of the sinus openings, you are more likely to get sinusitis.   What are the symptoms?   Symptoms include:   feeling of fullness or pressure in your head   a headache that is most painful when you  first wake up in the morning or when you bend your head down or forward   pain above or below your eyes   aching in the upper jaw and teeth   runny or stuffy nose   cough, especially at night   fluid draining down the back of your throat (postnasal drainage)   sore throat in the morning or evening.   How is it diagnosed?   Your healthcare provider will ask about your symptoms and will examine you. You may have an X-ray to look for swelling, fluid, or small benign growths (polyps) in the sinuses.   How is it treated?   Decongestants may help. They may be nonprescription or prescription. They are available as liquids, pills, and nose sprays.   Your healthcare provider may prescribe an antibiotic. In some cases you may need to take decongestants and antibiotics for several weeks.   You may need nonprescription medicine for pain, such as acetaminophen or ibuprofen. Check with your healthcare provider before you give any medicine that contains aspirin or salicylates to a child or teen. This includes medicines like baby aspirin, some cold medicines, and Pepto Bismol. Children and teens who take aspirin are at risk for a serious illness called Reye's syndrome. Ibuprofen is an NSAID. Nonsteroidal anti-inflammatory medicines (NSAIDs) may cause stomach bleeding and other problems. These risks increase with age. Read the label and take as directed. Unless recommended by your healthcare provider, do not take NSAIDs for more than 10 days for any reason.   If you have chronic or repeated sinus infections, allergies may be the cause. Your healthcare provider may prescribe antihistamine tablets or prescription nasal sprays (steroids or cromolyn) to treat the allergies.   If you have chronic, severe sinusitis that does not respond to treatment with medicines, surgery may be done. The surgeon can create an extra or enlarged passageway in the wall of the sinus cavity. This allows the sinuses to drain more easily through the nasal  passages. This should help them stay free of infection.   How long will the effects last?   Symptoms may get better gradually over 3 to 10 days. Depending on what caused the sinusitis and how severe it is, it may last for days or weeks. The symptoms may come back if you do not finish all of your antibiotic.   How can I take care of myself?   Follow your healthcare provider's instructions.   If you are taking an antibiotic, take all of it as directed by your provider. If you stop taking the medicine when your symptoms are gone but before you have taken all of the medicine, symptoms may come back.   Avoid tobacco smoke.   If you have allergies, take care to avoid the things you are allergic to, such as animal dander.   Add moisture to the air with a humidifier or a vaporizer, unless you have mold allergy (mold may grow in your vaporizer).   Inhale steam from a basin of hot water or shower to help open your sinuses and relieve pain.   Use saline nasal sprays to help wash out nasal passages and clear some mucus from the airways.   Use decongestants as directed on the label or by your provider.   If you are using a nonprescription nasal-spray decongestant, generally you should not use it for more than 3 days. After 3 days it may cause your symptoms to get worse. Ask your healthcare provider if it is OK for you to use a nasal spray decongestant longer than this.   Get plenty of rest.   Drink more fluids to keep the mucus as thin as possible so your sinuses can drain more easily.   Put warm compresses on painful areas.   Take antibiotics as prescribed. Use all of the medicine, even after you feel better. Some sinus infections require 2 to 4 weeks of antibiotic treatment.   See your healthcare provider if the pain lasts for several days or gets worse.   If the sinus areas above or below your eyes are swollen or bulging, see your healthcare provider right away. This symptom may mean that the infection is spreading. A  "spreading infection can affect other parts of your body--even the brain--and needs to be treated promptly.   How can I help prevent sinusitis?   Treat your colds and allergies promptly. Use decongestants as soon as you start having symptoms.   Do not smoke and stay away from secondhand smoke.   Drink lots of fluids to keep the mucus thin.   Humidify your home if the air is particularly dry.   If you have sinus infections often, consider having allergy tests.   If sinusitis continues to be a problem despite treatment, you might need an exam by an ear, nose, and throat doctor (called an ENT or otolaryngologist). The specialist will check for polyps or a deformed bone that may be blocking your sinuses.     Published by [employer].  This content is reviewed periodically and is subject to change as new health information becomes available. The information is intended to inform and educate and is not a replacement for medical evaluation, advice, diagnosis or treatment by a healthcare professional.   Developed by [employer].   ? 2010 [employer] and/or its affiliates. All Rights Reserved.   Copyright   [employer] 2011  Adult Health Advisor                       Generalized Anxiety Disorder  What is generalized anxiety disorder?   Generalized anxiety disorder (GNEARO) is a condition in which a person worries excessively and unrealistically. They may also be jittery, restless, or dizzy. When these symptoms last for at least 6 months, a diagnosis of GENARO may be made.  GENARO may exist by itself, or with both anxiety and depression. It is estimated that almost 5% of people have had this disorder during their lives.  How does it occur?   The cause of GENARO is unknown. Genetic and environmental factors play a role. Women have GENARO about twice as often as men.  The worry in GENARO is not about panic attacks or being afraid in public places. It is typically \"free-floating\" anxiety out of proportion to any real life situation. " The worrying can interfere with normal day-to-day activities and work or school.  What are the symptoms?   Symptoms include excessive, unrealistic, and uncontrollable worrying about many things such as:  the state of the world   the economy   violence in society   your job   the bills   chores   family members  Physical symptoms such as muscle tension, sleep problems, or feeling on edge usually go along with anxiety. A person may be short-tempered and unable to focus or concentrate because of the worrying. Other symptoms include sweating, shaking, having a very fast heartbeat, feeling out of breath, needing to go to the bathroom often and feeling like fainting. People with GENARO may be uneasy in a group or in a waiting room.  How is it diagnosed?   There is no lab test for GENARO. Your healthcare provider or therapist will ask about your symptoms. He or she will make sure you do not have a medical illness or drug or alcohol problem that could cause the symptoms. Some medicines can cause anxiety or make it worse. These include asthma medicines, stimulants, and steroids such as prednisone.  If you have had the symptoms for at least 6 months, if you have had to cut back on your activities, and if you find it difficult to get things done, you may be diagnosed with generalized anxiety disorder.  How is it treated?   Different types of approaches have proven helpful in treating GENARO. These include medicine, behavior therapy, relaxation therapy, cognitive therapy, and stress management techniques. Which treatments your healthcare provider or therapist uses may depend upon how much the disorder interferes with your day-to-day life.  Several types of medicines can help treat GENARO. Your healthcare provider will work with you to carefully select the best one for you.  How long will the effects last?   GENARO can last many years and sometimes an entire lifetime.   How can I take care of myself?   Get support. Talk with family and  friends. Consider joining a support group in your area. Go to a stress management class in your local community.   Learn to manage stress. Ask for help at home and work when the load is too great to handle. Find ways to relax, for example take up a hobby, listen to music, watch movies, take walks. Try deep breathing exercises when you feel stressed.   Take care of your physical health. Try to get at least 7 to 9 hours of sleep each night. Eat a healthy diet. Limit caffeine. If you smoke, quit. Avoid alcohol and drugs, because they can make your symptoms worse. Exercise according to your healthcare provider's instructions.   Check your medicines. To help prevent problems, tell your healthcare provider and pharmacist about all the medicines, natural remedies, vitamins, and other supplements that you take.   Contact your healthcare provider or therapist if you have any questions or your symptoms seem to be getting worse.  You may also want to contact [employer] (formerly the National Mental Health Association or NM). [facility]'s toll-free Information Center number is 2-795-735-[facility]. Its web site address is http://[email or web address]                   Follow-ups after your visit        Follow-up notes from your care team     Return in about 1 month (around 9/13/2018).      Your next 10 appointments already scheduled     Aug 22, 2018  4:30 PM CDT   LAB with [facility] ([facility])    [address] [facility] [id number] [phone]           Please do not eat 10-12 hours before your appointment if you are coming in fasting for labs on lipids, cholesterol, or glucose (sugar). This does not apply to pregnant women. Water, hot tea and black coffee (with nothing added) are okay. Do not drink other fluids, diet soda or chew gum.            Nov 27, 2018  8:00 AM CST   Return Visit with Shari Fang MD   [facility] ([facility]    [address]  [facility] [id number] [phone]              Who to contact     If you have questions or need follow up information about today's clinic visit or your schedule please contact [facility] directly at [phone].  Normal or non-critical lab and imaging results will be communicated to you by MyChart, letter or phone within 4 business days after the clinic has received the results. If you do not hear from us within 7 days, please contact the clinic through [employer] or phone. If you have a critical or abnormal lab result, we will notify you by phone as soon as possible.  Submit refill requests through [employer] or call your pharmacy and they will forward the refill request to us. Please allow 3 business days for your refill to be completed.          Additional Information About Your Visit        MyChart Information     [employer] gives you secure access to your electronic health record. If you see a primary care provider, you can also send messages to your care team and make appointments. If you have questions, please call your primary care clinic.  If you do not have a primary care provider, please call [phone] and they will assist you.        Care EveryWhere ID     This is your Care EveryWhere ID. This could be used by other organizations to access your [place] medical records  [id number]        Your Vitals Were     Pulse Temperature Respirations Pulse Oximetry BMI (Body Mass Index)       90 97.8  F (36.6  C) (Temporal) 18 98% 29.57 kg/m2        Blood Pressure from Last 3 Encounters:   08/13/18 128/80   05/22/18 116/80   03/20/18 135/86    Weight from Last 3 Encounters:   08/13/18 191 lb 9.6 oz (86.9 kg)   05/22/18 193 lb (87.5 kg)   03/20/18 191 lb 8 oz (86.9 kg)              Today, you had the following     No orders found for display         Today's Medication Changes          These changes are accurate as of 8/13/18 10:00 AM.  If you have any questions, ask your nurse or  doctor.               Start taking these medicines.        Dose/Directions    amoxicillin-clavulanate 875-125 MG per tablet   Commonly known as:  AUGMENTIN   Used for:  Acute sinusitis with symptoms > 10 days   Started by:  Xiomara Moralez PA-C        Dose:  1 tablet   Take 1 tablet by mouth 2 times daily   Quantity:  20 tablet   Refills:  0       sertraline 50 MG tablet   Commonly known as:  ZOLOFT   Used for:  GENARO (generalized anxiety disorder)   Started by:  Xiomara Moralez PA-C        Take 1/2 tablet (25 mg) for 2 weeks, then increase to 1 tablet orally daily   Quantity:  30 tablet   Refills:  0            Where to get your medicines      These medications were sent to [facility] [address] [place] [address] [facility] [place] [address]  [address] [facility], [facility] [address]     Phone:  [phone]     amoxicillin-clavulanate 875-125 MG per tablet    sertraline 50 MG tablet                Primary Care Provider Office Phone # Fax #    Xiomara Moralez PA-C [phone]       [address] [facility] [address]  [facility] [address]        Equal Access to Services     JOHNIE SMITH AH: Hadii ines ku hadasho Soomaali, waaxda luqadaha, qaybta kaalmada adeegyada, waxay erniein haytimin marley vargas. So [facility] [phone].    ATENCIÓN: Si habla español, tiene a grissom disposición servicios gratuitos de asistencia lingüística. [facility] [phone].    We comply with applicable federal civil rights laws and [place] laws. We do not discriminate on the basis of race, color, national origin, age, disability, sex, sexual orientation, or gender identity.            Thank you!     Thank you for choosing [facility]  for your care. Our goal is always to provide you with excellent care. Hearing back from our patients is one way we can continue to improve our services. Please take a few minutes to complete the written survey that you may  receive in the mail after your visit with us. Thank you!             Your Updated Medication List - Protect others around you: Learn how to safely use, store and throw away your medicines at www.disposemymeds.org.          This list is accurate as of 8/13/18 10:00 AM.  Always use your most recent med list.                   Brand Name Dispense Instructions for use Diagnosis    amoxicillin-clavulanate 875-125 MG per tablet    AUGMENTIN    20 tablet    Take 1 tablet by mouth 2 times daily    Acute sinusitis with symptoms > 10 days       CHANTIX 1 MG tablet   Generic drug:  varenicline      Take 1 mg by mouth 2 times daily        RA NICOTINE GUM 2 MG gum   Generic drug:  nicotine polacrilex      Place 2 mg inside cheek as needed for smoking cessation        sertraline 50 MG tablet    ZOLOFT    30 tablet    Take 1/2 tablet (25 mg) for 2 weeks, then increase to 1 tablet orally daily    GENARO (generalized anxiety disorder)

## 2018-08-14 ASSESSMENT — PATIENT HEALTH QUESTIONNAIRE - PHQ9: SUM OF ALL RESPONSES TO PHQ QUESTIONS 1-9: 4

## 2018-08-14 ASSESSMENT — ANXIETY QUESTIONNAIRES: GAD7 TOTAL SCORE: 9

## 2018-08-22 DIAGNOSIS — D69.6 THROMBOCYTOPENIA (H): ICD-10-CM

## 2018-08-22 LAB
BASOPHILS # BLD AUTO: 0 10E9/L (ref 0–0.2)
BASOPHILS NFR BLD AUTO: 0.5 %
DIFFERENTIAL METHOD BLD: ABNORMAL
EOSINOPHIL # BLD AUTO: 0.1 10E9/L (ref 0–0.7)
EOSINOPHIL NFR BLD AUTO: 1.7 %
ERYTHROCYTE [DISTWIDTH] IN BLOOD BY AUTOMATED COUNT: 13.3 % (ref 10–15)
HCT VFR BLD AUTO: 41.8 % (ref 40–53)
HGB BLD-MCNC: 14.8 G/DL (ref 13.3–17.7)
LYMPHOCYTES # BLD AUTO: 2.4 10E9/L (ref 0.8–5.3)
LYMPHOCYTES NFR BLD AUTO: 29 %
MCH RBC QN AUTO: 30.2 PG (ref 26.5–33)
MCHC RBC AUTO-ENTMCNC: 35.4 G/DL (ref 31.5–36.5)
MCV RBC AUTO: 85 FL (ref 78–100)
MONOCYTES # BLD AUTO: 0.6 10E9/L (ref 0–1.3)
MONOCYTES NFR BLD AUTO: 7.6 %
NEUTROPHILS # BLD AUTO: 5.2 10E9/L (ref 1.6–8.3)
NEUTROPHILS NFR BLD AUTO: 61.2 %
PLATELET # BLD AUTO: 124 10E9/L (ref 150–450)
RBC # BLD AUTO: 4.9 10E12/L (ref 4.4–5.9)
WBC # BLD AUTO: 8.4 10E9/L (ref 4–11)

## 2018-08-22 PROCEDURE — 36415 COLL VENOUS BLD VENIPUNCTURE: CPT | Performed by: INTERNAL MEDICINE

## 2018-08-22 PROCEDURE — 85025 COMPLETE CBC W/AUTO DIFF WBC: CPT | Performed by: INTERNAL MEDICINE

## 2018-09-11 NOTE — PROGRESS NOTES
SUBJECTIVE:   Shlomo Nelson is a 28 year old male who presents to clinic today for the following health issues:    HPI     Depression and Anxiety Follow-Up    Status since last visit: No change    Other associated symptoms:None    Complicating factors:     Significant life event: No     Current substance abuse: None    - No side effects   - Doesn't really feel anything   - Did have lump in feeling return, went to ENT, was reassured nothing is wrong       PHQ-9 8/13/2018 9/14/2018   Total Score 4 2   Q9: Suicide Ideation Not at all Not at all     GENARO-7 SCORE 10/23/2014 8/13/2018 9/14/2018   Total Score 6 - -   Total Score - - 6 (mild anxiety)   Total Score - 9 6     In the past two weeks have you had thoughts of suicide or self-harm?  No.    Do you have concerns about your personal safety or the safety of others?   No      Plan from previous visit 8/13/18  2. GENARO   - New diagnosis, unclear if lifelong or situational   - Discussed SSRI therapies, use and side effects   - Start Zoloft - 1/2 tablet (25 mg) for 2 weeks, then increase to full tablet (50 mg)     Discussed use and side effects   - Offered counseling, patient declined   - Recheck 1 month   - Hand out given        Problem list and histories reviewed & adjusted, as indicated.  Additional history: as documented    ROS:  Constitutional, HEENT, cardiovascular, pulmonary, gi and gu systems are negative, except as otherwise noted.    OBJECTIVE:   /68  Pulse 80  Temp 97.8  F (36.6  C) (Temporal)  Resp 16  Wt 192 lb 3.2 oz (87.2 kg)  SpO2 97%  BMI 29.66 kg/m2  Body mass index is 29.66 kg/(m^2).  GENERAL APPEARANCE: healthy, alert and no distress  EYES: Eyes grossly normal to inspection, PERRLA, conjunctivae and sclerae without injection or discharge, EOM intact   MS: No musculoskeletal defects are noted and gait is age appropriate without ataxia   SKIN: No suspicious lesions or rashes, hydration status appears adeuqate with normal skin turgor   PSYCH:  Alert and oriented x3; speech- coherent , normal rate and volume; able to articulate logical thoughts, able to abstract reason, no tangential thoughts, no hallucinations or delusions, mentation appears normal, Mood is euthymic. Affect is appropriate for this mood state and bright. Thought content is free of suicidal ideation, hallucinations, and delusions. Dress is adequate and upkept. Eye contact is good during conversation.       Diagnostic Test Results:  none     ASSESSMENT/PLAN:       ICD-10-CM    1. GENARO (generalized anxiety disorder) F41.1 sertraline (ZOLOFT) 50 MG tablet     - Patient with moderate improvement since doing 2 weeks at 25 mg Sertraline followed by 2 weeks of 50 mg   - Discussed leaving dose and monitoring vs. Increasing dose   - Patient would like to continue at 50 mg   - Reviewed use and side effects, refilled   - Recheck 1-2 months     The patient indicates understanding of these issues and agrees with the plan.    Follow up: 1-2 months       Xiomara Moralez PA-C  [facility]

## 2018-09-13 DIAGNOSIS — F41.1 GAD (GENERALIZED ANXIETY DISORDER): ICD-10-CM

## 2018-09-13 NOTE — TELEPHONE ENCOUNTER
Called and spoke with patient regarding message below.  Patient verbalized understanding.  Lizbeth Jenkins CMA ([facility])

## 2018-09-13 NOTE — TELEPHONE ENCOUNTER
Odilia montejo sent, has appointment next week     Sandor Moralez PA-C  [facility]

## 2018-09-13 NOTE — TELEPHONE ENCOUNTER
Sertraline    Routing refill request to provider for review/approval because:  Update sig    Next 5 appointments (look out 90 days)     Sep 17, 2018  3:00 PM CDT   Office Visit with Xiomara Moralez PA-C   [facility])    [address] [place] [id number]   [phone]            Nov 27, 2018  8:00 AM CST   Return Visit with Shari Fang MD   [facility]    [address] [place] [id number]   [phone]                  Annetta Interiano, RN, BSN

## 2018-09-14 ASSESSMENT — PATIENT HEALTH QUESTIONNAIRE - PHQ9
SUM OF ALL RESPONSES TO PHQ QUESTIONS 1-9: 2
SUM OF ALL RESPONSES TO PHQ QUESTIONS 1-9: 2
10. IF YOU CHECKED OFF ANY PROBLEMS, HOW DIFFICULT HAVE THESE PROBLEMS MADE IT FOR YOU TO DO YOUR WORK, TAKE CARE OF THINGS AT HOME, OR GET ALONG WITH OTHER PEOPLE: SOMEWHAT DIFFICULT

## 2018-09-14 ASSESSMENT — ANXIETY QUESTIONNAIRES
GAD7 TOTAL SCORE: 6
7. FEELING AFRAID AS IF SOMETHING AWFUL MIGHT HAPPEN: MORE THAN HALF THE DAYS
4. TROUBLE RELAXING: NOT AT ALL
7. FEELING AFRAID AS IF SOMETHING AWFUL MIGHT HAPPEN: MORE THAN HALF THE DAYS
6. BECOMING EASILY ANNOYED OR IRRITABLE: NOT AT ALL
5. BEING SO RESTLESS THAT IT IS HARD TO SIT STILL: NOT AT ALL
1. FEELING NERVOUS, ANXIOUS, OR ON EDGE: SEVERAL DAYS
GAD7 TOTAL SCORE: 6
2. NOT BEING ABLE TO STOP OR CONTROL WORRYING: SEVERAL DAYS
3. WORRYING TOO MUCH ABOUT DIFFERENT THINGS: MORE THAN HALF THE DAYS

## 2018-09-15 ASSESSMENT — ANXIETY QUESTIONNAIRES: GAD7 TOTAL SCORE: 6

## 2018-09-15 ASSESSMENT — PATIENT HEALTH QUESTIONNAIRE - PHQ9: SUM OF ALL RESPONSES TO PHQ QUESTIONS 1-9: 2

## 2018-09-17 ENCOUNTER — OFFICE VISIT (OUTPATIENT)
Dept: FAMILY MEDICINE | Facility: OTHER | Age: 28
End: 2018-09-17
Payer: COMMERCIAL

## 2018-09-17 VITALS
BODY MASS INDEX: 29.66 KG/M2 | RESPIRATION RATE: 16 BRPM | HEART RATE: 80 BPM | WEIGHT: 192.2 LBS | TEMPERATURE: 97.8 F | SYSTOLIC BLOOD PRESSURE: 128 MMHG | DIASTOLIC BLOOD PRESSURE: 68 MMHG | OXYGEN SATURATION: 97 %

## 2018-09-17 DIAGNOSIS — F41.1 GAD (GENERALIZED ANXIETY DISORDER): Primary | ICD-10-CM

## 2018-09-17 PROCEDURE — 99214 OFFICE O/P EST MOD 30 MIN: CPT | Performed by: PHYSICIAN ASSISTANT

## 2018-09-17 ASSESSMENT — PAIN SCALES - GENERAL: PAINLEVEL: NO PAIN (0)

## 2018-09-17 NOTE — MR AVS SNAPSHOT
After Visit Summary   9/17/2018    Shlomo Nelson    MRN: [id number]           Patient Information     Date Of Birth          1990        Visit Information        Provider Department      9/17/2018 3:00 PM Xiomara Moralez PA-C [facility]        Today's Diagnoses     GENARO (generalized anxiety disorder)    -  1      Care Instructions       - Continue on Zoloft 50 mg     - Recheck 1-2 months           Follow-ups after your visit        Follow-up notes from your care team     Return in about 1 month (around 10/17/2018).      Your next 10 appointments already scheduled     Nov 27, 2018  8:00 AM CST   Return Visit with Shari Fang MD   [facility] ([facility]    [address] [place] [address] [phone]              Who to contact     If you have questions or need follow up information about today's clinic visit or your schedule please contact [facility] directly at [phone].  Normal or non-critical lab and imaging results will be communicated to you by CoachBasehart, letter or phone within 4 business days after the clinic has received the results. If you do not hear from us within 7 days, please contact the clinic through Aktivitot or phone. If you have a critical or abnormal lab result, we will notify you by phone as soon as possible.  Submit refill requests through Product World or call your pharmacy and they will forward the refill request to us. Please allow 3 business days for your refill to be completed.          Additional Information About Your Visit        CoachBasehart Information     [employer] gives you secure access to your electronic health record. If you see a primary care provider, you can also send messages to your care team and make appointments. If you have questions, please call your primary care clinic.  If you do not have a primary care provider, please call [phone] and they will assist you.         Care EveryWhere ID     This is your Care EveryWhere ID. This could be used by other organizations to access your [place] medical records  [id number]        Your Vitals Were     Pulse Temperature Respirations Pulse Oximetry BMI (Body Mass Index)       80 97.8  F (36.6  C) (Temporal) 16 97% 29.66 kg/m2        Blood Pressure from Last 3 Encounters:   09/17/18 128/68   08/13/18 128/80   05/22/18 116/80    Weight from Last 3 Encounters:   09/17/18 192 lb 3.2 oz (87.2 kg)   08/13/18 191 lb 9.6 oz (86.9 kg)   05/22/18 193 lb (87.5 kg)              Today, you had the following     No orders found for display         Today's Medication Changes          These changes are accurate as of 9/17/18  3:44 PM.  If you have any questions, ask your nurse or doctor.               These medicines have changed or have updated prescriptions.        Dose/Directions    sertraline 50 MG tablet   Commonly known as:  ZOLOFT   This may have changed:  additional instructions   Used for:  GENARO (generalized anxiety disorder)   Changed by:  Xiomara Moralez PA-C        Dose:  50 mg   Take 1 tablet (50 mg) by mouth daily   Quantity:  30 tablet   Refills:  3            Where to get your medicines      [facility] [address] [place] [address] [facility] AT [facility] OF [facility] [address]  [address] [facility], [facility] [address]     Phone:  [phone]     sertraline 50 MG tablet                Primary Care Provider Office Phone # Fax #    Xiomara Moralez PA-C [phone]       [address] [place] [address] [address]  [facility] [address]        Equal Access to Services     YOHAN SMITH AH: Fernando Lima, wabinda luqadaha, qaybta kaalmada april, berta vargas. So [facility] [phone].    ATENCIÓN: Si habla español, tiene a grissom disposición servicios gratuitos de asistencia lingüística. Llame al [phone].    We comply with applicable federal  civil rights laws and [place] laws. We do not discriminate on the basis of race, color, national origin, age, disability, sex, sexual orientation, or gender identity.            Thank you!     Thank you for choosing [facility]  for your care. Our goal is always to provide you with excellent care. Hearing back from our patients is one way we can continue to improve our services. Please take a few minutes to complete the written survey that you may receive in the mail after your visit with us. Thank you!             Your Updated Medication List - Protect others around you: Learn how to safely use, store and throw away your medicines at www.disposemymeds.org.          This list is accurate as of 9/17/18  3:44 PM.  Always use your most recent med list.                   Brand Name Dispense Instructions for use Diagnosis    CHANTIX 1 MG tablet   Generic drug:  varenicline      Take 1 mg by mouth 2 times daily        RA NICOTINE GUM 2 MG gum   Generic drug:  nicotine polacrilex      Place 2 mg inside cheek as needed for smoking cessation        sertraline 50 MG tablet    ZOLOFT    30 tablet    Take 1 tablet (50 mg) by mouth daily    GENARO (generalized anxiety disorder)

## 2018-11-27 ENCOUNTER — ONCOLOGY VISIT (OUTPATIENT)
Dept: ONCOLOGY | Facility: CLINIC | Age: 28
End: 2018-11-27
Payer: COMMERCIAL

## 2018-11-27 VITALS
TEMPERATURE: 97 F | BODY MASS INDEX: 28.78 KG/M2 | WEIGHT: 189.9 LBS | DIASTOLIC BLOOD PRESSURE: 70 MMHG | RESPIRATION RATE: 16 BRPM | HEART RATE: 80 BPM | HEIGHT: 68 IN | SYSTOLIC BLOOD PRESSURE: 126 MMHG | OXYGEN SATURATION: 97 %

## 2018-11-27 DIAGNOSIS — D69.6 THROMBOCYTOPENIA (H): Primary | ICD-10-CM

## 2018-11-27 DIAGNOSIS — F41.1 GAD (GENERALIZED ANXIETY DISORDER): ICD-10-CM

## 2018-11-27 LAB
BASOPHILS # BLD AUTO: 0.1 10E9/L (ref 0–0.2)
BASOPHILS NFR BLD AUTO: 0.7 %
DIFFERENTIAL METHOD BLD: ABNORMAL
EOSINOPHIL NFR BLD AUTO: 3 %
ERYTHROCYTE [DISTWIDTH] IN BLOOD BY AUTOMATED COUNT: 12.9 % (ref 10–15)
HCT VFR BLD AUTO: 41.7 % (ref 40–53)
HGB BLD-MCNC: 14.4 G/DL (ref 13.3–17.7)
IMM GRANULOCYTES # BLD: 0.1 10E9/L (ref 0–0.4)
IMM GRANULOCYTES NFR BLD: 1.2 %
LYMPHOCYTES # BLD AUTO: 2 10E9/L (ref 0.8–5.3)
LYMPHOCYTES NFR BLD AUTO: 26.5 %
MCH RBC QN AUTO: 30.1 PG (ref 26.5–33)
MCHC RBC AUTO-ENTMCNC: 34.5 G/DL (ref 31.5–36.5)
MCV RBC AUTO: 87 FL (ref 78–100)
MONOCYTES # BLD AUTO: 0.4 10E9/L (ref 0–1.3)
MONOCYTES NFR BLD AUTO: 5.8 %
NEUTROPHILS # BLD AUTO: 4.6 10E9/L (ref 1.6–8.3)
NEUTROPHILS NFR BLD AUTO: 62.8 %
NRBC # BLD AUTO: 0 10*3/UL
NRBC BLD AUTO-RTO: 0 /100
PLATELET # BLD AUTO: 120 10E9/L (ref 150–450)
RBC # BLD AUTO: 4.79 10E12/L (ref 4.4–5.9)
WBC # BLD AUTO: 7.4 10E9/L (ref 4–11)

## 2018-11-27 PROCEDURE — 85025 COMPLETE CBC W/AUTO DIFF WBC: CPT | Performed by: INTERNAL MEDICINE

## 2018-11-27 PROCEDURE — 99213 OFFICE O/P EST LOW 20 MIN: CPT | Performed by: INTERNAL MEDICINE

## 2018-11-27 PROCEDURE — 36415 COLL VENOUS BLD VENIPUNCTURE: CPT | Performed by: INTERNAL MEDICINE

## 2018-11-27 ASSESSMENT — PAIN SCALES - GENERAL: PAINLEVEL: NO PAIN (0)

## 2018-11-27 NOTE — LETTER
11/27/2018         RE: Shlomo Nelson  [address]  [place] [address]        Dear Colleague,    Thank you for referring your patient, Shlomo Nelson, to the [facility]. Please see a copy of my visit note below.    DATE OF VISIT: Nov 27, 2018    Shlomo Nelson is a 28 year old male is seen today for   Chief Complaint   Patient presents with     Hematology     6 month follow up-Thrombocytopenia     Results     labs completed today   .       (D69.6) Thrombocytopenia (H)  (primary encounter diagnosis)  I reviewed with the patient today most recent CBC.  Platelet count has been stable around 120,000.  Patient denies any bruising or bleeding.  I would recommend continue to monitor platelet count every few months.  I will see the patient in 1 years time or sooner if there are new developments or concerns.  I asked the patient to abstain from alcohol as well.    (F41.1) GENARO (generalized anxiety disorder)  Patient currently on Zoloft 50 mg orally daily.    The patient is ready to learn, no apparent learning barriers were identified.  Diagnosis and treatment plans were explained to the patient. The patient expressed understanding of the content. The patient asked appropriate questions. The patient questions were answered to his satisfaction.  Spent 15 minutes more than 50% of the time in counseling and coordination of care.  Chart documentation with Dragon Voice recognition Software. Although reviewed after completion, some words and grammatical errors may remain.    Again, thank you for allowing me to participate in the care of your patient.        Sincerely,        Shari Fang MD

## 2018-11-27 NOTE — NURSING NOTE
DISCHARGE PLAN:  Next appointments: See patient instruction section  Departure Mode: Ambulatory  Accompanied by: self  3 minutes for nursing discharge (face to face time)     Shlomo Nelson is here today for Hematology follow up.  Writing nurse seen patient after Medical Oncology appointment to address questions/concerns/coordinate care. Patient to have labs every 3 months and follow up in 1 year.  Patient ambulated by nurse to  to schedule follow up and/or lab appointments.  Imaging scheduled if ordered.  See patient instructions and Oncologist's Progress note for further details. Questions and concerns addressed to patient's satisfaction. Patient verbalized and demonstrated understanding of plan.  Contact information provided and patient is encouraged to call with any that arise in the interim of care.    Joel Monique, RN, BSN, OCN   Oncology Care Coordinator [facility]  [phone]  11/27/2018, 8:36 AM

## 2018-11-27 NOTE — MR AVS SNAPSHOT
After Visit Summary   11/27/2018    Shlomo Nelson    MRN: [id number]           Patient Information     Date Of Birth          1990        Visit Information        Provider Department      11/27/2018 8:15 AM Shari Fang MD [facility]        Today's Diagnoses     Thrombocytopenia (H)    -  1    GENARO (generalized anxiety disorder)          Care Instructions      Please follow up with Dr. Fang in 1 year.  Please schedule labs every 3 months.  You can come 20-30 minutes prior to follow up appointment for the last set of labs.    Lab Date/Time:    Lab Date/Time:    Lab Date/Time:    Office visit follow up with Dr. Fang Date/Time:   Come 20-30 minutes prior to follow up for labs    If you have any questions or concerns please feel free to call.    If you need to reschedule please call:  Clinic or Lab Appointment - [phone]  Infusion - [phone]  Imaging - [phone]    Joel Monique RN, BSN, OCN  M [facility]   Oncology/Hematology Care Coordinator RN  [facility]  [phone]                    Follow-ups after your visit        Future tests that were ordered for you today     Open Standing Orders        Priority Remaining Interval Expires Ordered    CBC with platelets differential Routine 4/4 3 month 11/27/2019 11/27/2018            Who to contact     If you have questions or need follow up information about today's clinic visit or your schedule please contact [facility] directly at [phone].  Normal or non-critical lab and imaging results will be communicated to you by MyChart, letter or phone within 4 business days after the clinic has received the results. If you do not hear from us within 7 days, please contact the clinic through MyChart or phone. If you have a critical or abnormal lab result, we will notify you by phone as soon as possible.  Submit refill requests through Oesia or call your pharmacy and they will forward  "the refill request to us. Please allow 3 business days for your refill to be completed.          Additional Information About Your Visit        "[employer]hart Information     [employer] gives you secure access to your electronic health record. If you see a primary care provider, you can also send messages to your care team and make appointments. If you have questions, please call your primary care clinic.  If you do not have a primary care provider, please call [phone] and they will assist you.        Care EveryWhere ID     This is your Care EveryWhere ID. This could be used by other organizations to access your [place] medical records  [id number]        Your Vitals Were     Pulse Temperature Respirations Height Pulse Oximetry BMI (Body Mass Index)    80 97  F (36.1  C) (Temporal) 16 1.715 m (5' 7.5\") 97% 29.3 kg/m2       Blood Pressure from Last 3 Encounters:   11/27/18 126/70   09/17/18 128/68   08/13/18 128/80    Weight from Last 3 Encounters:   11/27/18 86.1 kg (189 lb 14.4 oz)   09/17/18 87.2 kg (192 lb 3.2 oz)   08/13/18 86.9 kg (191 lb 9.6 oz)              We Performed the Following     CBC with platelets differential        Primary Care Provider Office Phone # Fax #    Xiomara ROBERTA Moralez PA-C [phone]       [address] [facility] [address]  [facility] [address]        Equal Access to Services     [facility]: Hadii aad ku hadasho Soomaali, waaxda luqadaha, qaybta kaalmada adeegyada, berta last . So [facility] [phone].    ATENCIÓN: Si habla español, tiene a grissom disposición servicios gratuitos de asistencia lingüística. Llame al [phone].    We comply with applicable federal civil rights laws and [place] laws. We do not discriminate on the basis of race, color, national origin, age, disability, sex, sexual orientation, or gender identity.            Thank you!     Thank you for choosing [facility]  for your care. Our goal is always to provide " you with excellent care. Hearing back from our patients is one way we can continue to improve our services. Please take a few minutes to complete the written survey that you may receive in the mail after your visit with us. Thank you!             Your Updated Medication List - Protect others around you: Learn how to safely use, store and throw away your medicines at www.disposemymeds.org.          This list is accurate as of 11/27/18  8:32 AM.  Always use your most recent med list.                   Brand Name Dispense Instructions for use Diagnosis    CHANTIX 1 MG tablet   Generic drug:  varenicline      Take 1 mg by mouth 2 times daily        RA NICOTINE GUM 2 MG gum   Generic drug:  nicotine      Place 2 mg inside cheek as needed for smoking cessation        sertraline 50 MG tablet    ZOLOFT    30 tablet    Take 1 tablet (50 mg) by mouth daily    GENARO (generalized anxiety disorder)

## 2018-11-27 NOTE — PROGRESS NOTES
DATE OF VISIT: Nov 27, 2018    Shlomo Nelson is a 28 year old male is seen today for   Chief Complaint   Patient presents with     Hematology     6 month follow up-Thrombocytopenia     Results     labs completed today   .       (D69.6) Thrombocytopenia (H)  (primary encounter diagnosis)  I reviewed with the patient today most recent CBC.  Platelet count has been stable around 120,000.  Patient denies any bruising or bleeding.  I would recommend continue to monitor platelet count every few months.  I will see the patient in 1 years time or sooner if there are new developments or concerns.  I asked the patient to abstain from alcohol as well.    (F41.1) GENARO (generalized anxiety disorder)  Patient currently on Zoloft 50 mg orally daily.    The patient is ready to learn, no apparent learning barriers were identified.  Diagnosis and treatment plans were explained to the patient. The patient expressed understanding of the content. The patient asked appropriate questions. The patient questions were answered to his satisfaction.  Spent 15 minutes more than 50% of the time in counseling and coordination of care.  Chart documentation with Dragon Voice recognition Software. Although reviewed after completion, some words and grammatical errors may remain.

## 2018-11-27 NOTE — NURSING NOTE
"Oncology Rooming Note    November 27, 2018 8:20 AM   Shlomo Nelson is a 28 year old male who presents for:    Chief Complaint   Patient presents with     Hematology     6 month follow up-Thrombocytopenia     Results     labs completed today     Initial Vitals: /70 (BP Location: Right arm, Patient Position: Chair, Cuff Size: Adult Regular)  Pulse 80  Temp 97  F (36.1  C) (Temporal)  Resp 16  Ht 1.715 m (5' 7.5\")  Wt 86.1 kg (189 lb 14.4 oz)  SpO2 97%  BMI 29.3 kg/m2 Estimated body mass index is 29.3 kg/(m^2) as calculated from the following:    Height as of this encounter: 1.715 m (5' 7.5\").    Weight as of this encounter: 86.1 kg (189 lb 14.4 oz). Body surface area is 2.02 meters squared.  No Pain (0) Comment: Data Unavailable   No LMP for male patient.  Allergies reviewed: Yes  Medications reviewed: Yes    Medications: Medication refills not needed today.  Pharmacy name entered into [facility]:    [facility] - [place], [place] - [address] [facility] [address] AT [facility] [address]  [facility] [place] [address] [facility]      6 minutes for nursing intake (face to face time)     Kasey SHELBY CMA              "

## 2018-11-27 NOTE — PATIENT INSTRUCTIONS
Please follow up with Dr. Fang in 1 year.  Please schedule labs every 3 months.  You can come 20-30 minutes prior to follow up appointment for the last set of labs.    Lab Date/Time:    Lab Date/Time:    Lab Date/Time:    Office visit follow up with Dr. Fang Date/Time:   Come 20-30 minutes prior to follow up for labs    If you have any questions or concerns please feel free to call.    If you need to reschedule please call:  Clinic or Lab Appointment - [phone]  Infusion - [phone]  Imaging - [phone]    Joel Monique, RN, BSN, OCN  [facility]   Oncology/Hematology Care Coordinator RN  [facility]  [phone]

## 2019-02-11 NOTE — PROGRESS NOTES
"  SUBJECTIVE:   Shlomo Nelson is a 28 year old male who presents to clinic today for the following health issues:      History of Present Illness     Depression & Anxiety Follow-up:     Depression/Anxiety:  Anxiety only    Status since last visit::  Stable    Other associated symptoms of anxiety::  None    Significant life event::  No    Current substance use::  None    Depression symptoms::  None       Today's PHQ-9         PHQ-9 Total Score:     (P) 2   PHQ-9 Q9 Suicidal ideation:   (P) Not at all   Thoughts of suicide or self harm:      Self-harm Plan:        Self-harm Action:          Safety concerns for self or others:       GENARO-7 Total Score: (P) 6    Diet:  Regular (no restrictions)  Frequency of exercise:  None  Taking medications regularly:  Yes  Medication side effects:  None  Additional concerns today:  Yes  Answers for HPI/ROS submitted by the patient on 2/13/2019   Chronic problems general questions HPI Form  If you checked off any problems, how difficult have these problems made it for you to do your work, take care of things at home, or get along with other people?: Not difficult at all  PHQ9 TOTAL SCORE: 2  GENARO 7 TOTAL SCORE: 6    Joint Pain    Onset: about a month    Description:   Location: diffuse throughout entire body, mostly in left knee and leg  Character: Cramping and tightness    Intensity: moderate    Progression of Symptoms: worse    Accompanying Signs & Symptoms:  Other symptoms: weakness of muscles    History:   Previous similar pain: YES- but not to this extent       Precipitating factors:   Trauma or overuse: no     Alleviating factors:  Improved by: nothing    Therapies Tried and outcome: stretching, ibuprofen   \"He does road construction. 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 Steady symptoms. I feel it all my joints. Its like they are thick . I have had panic attacks in the past but seems to be fairly controlled. Pain mostly in the left leg. It feels like its gets fatigued way more early " "than they should. I have had trouble pushing the cart at [employer] in the parking lot\"    Problem list and histories reviewed & adjusted, as indicated.  Additional history: as documented        Patient Active Problem List   Diagnosis     Muscle weakness (generalized)     Tobacco use disorder     Suprapubic abdominal pain     Thrombocytopenia (H)     Spleen enlargement     Urinary frequency     GENARO (generalized anxiety disorder)     Past Surgical History:   Procedure Laterality Date     FINGER SURGERY         Social History     Tobacco Use     Smoking status: Former Smoker     Smokeless tobacco: Former User     Types: Chew   Substance Use Topics     Alcohol use: Yes     Alcohol/week: 0.0 oz     Comment: twice a week; few drinks     Family History   Problem Relation Age of Onset     Lipids Father      Hypertension Maternal Grandmother      Hypertension Maternal Grandfather      Diabetes Paternal Grandmother      Hypertension Paternal Grandmother      Arthritis Paternal Grandmother      Diabetes Paternal Grandfather      Hypertension Paternal Grandfather      Prostate Cancer Paternal Grandfather      Cancer - colorectal Paternal Grandfather      Cancer Paternal Grandfather         kidney     Gastrointestinal Disease Brother         IBS     Asthma No family hx of      C.A.D. No family hx of      Cerebrovascular Disease No family hx of      Alcohol/Drug No family hx of      Allergies No family hx of      Alzheimer Disease No family hx of      Anesthesia Reaction No family hx of      Blood Disease No family hx of      Cardiovascular No family hx of      Circulatory No family hx of      Congenital Anomalies No family hx of      Connective Tissue Disorder No family hx of      Depression No family hx of      Eye Disorder No family hx of      Genetic Disorder No family hx of      Genitourinary Problems No family hx of      Gynecology No family hx of      Heart Disease No family hx of      Musculoskeletal Disorder No family hx " "of      Neurologic Disorder No family hx of      Obesity No family hx of      Osteoporosis No family hx of      Psychotic Disorder No family hx of      Respiratory No family hx of      Thyroid Disease No family hx of      Hearing Loss No family hx of          Current Outpatient Medications   Medication Sig Dispense Refill     nicotine polacrilex (RA NICOTINE GUM) 2 MG gum Place 2 mg inside cheek as needed for smoking cessation       sertraline (ZOLOFT) 100 MG tablet Take 1 tablet (100 mg) by mouth daily       varenicline (CHANTIX) 1 MG tablet Take 1 mg by mouth 2 times daily       No Known Allergies  Recent Labs   Lab Test 03/20/18  1228 02/09/18  1044 07/11/17  1633 10/21/14  1528   LDL  --   --   --  150*   HDL  --   --   --  60   TRIG  --   --   --  103   ALT 36 37 42 28   CR 0.78 0.95 0.95 0.88   GFRESTIMATED >90 >90 >90  Non  GFR Calc   >90  Non  GFR Calc     GFRESTBLACK >90 >90 >90   GFR Calc   >90   GFR Calc     POTASSIUM 4.0 4.4 3.8 4.0   TSH  --   --  1.99 1.94      BP Readings from Last 3 Encounters:   02/27/19 124/78   02/13/19 118/74   11/27/18 126/70    Wt Readings from Last 3 Encounters:   02/27/19 86.6 kg (191 lb)   02/13/19 89.8 kg (198 lb)   11/27/18 86.1 kg (189 lb 14.4 oz)                  Labs reviewed in EPIC    ROS:  Constitutional, HEENT, cardiovascular, pulmonary, GI, , musculoskeletal, neuro, skin, endocrine and psych systems are negative, except as otherwise noted.    OBJECTIVE:     /74 (BP Location: Left arm, Patient Position: Chair, Cuff Size: Adult Large)   Pulse 89   Temp 98.4  F (36.9  C) (Temporal)   Resp 16   Ht 1.753 m (5' 9\")   Wt 89.8 kg (198 lb)   SpO2 97%   BMI 29.24 kg/m    Body mass index is 29.24 kg/m .   Physical Exam   Constitutional: He is oriented to person, place, and time. He appears well-developed and well-nourished.   HENT:   Head: Normocephalic and atraumatic.   Cardiovascular: Normal " rate, regular rhythm, normal heart sounds and intact distal pulses. Exam reveals no gallop and no friction rub.   No murmur heard.  Pulmonary/Chest: Effort normal and breath sounds normal. No stridor. No respiratory distress. He has no wheezes. He has no rales. He exhibits no tenderness.   Neurological: He is alert and oriented to person, place, and time. No cranial nerve deficit or sensory deficit. He exhibits normal muscle tone. Coordination normal.    clonus noted in the right lower extremity   Psychiatric: He has a normal mood and affect. His behavior is normal. Judgment and thought content normal.         Diagnostic Test Results:  none     ASSESSMENT/PLAN:     Problem List Items Addressed This Visit     GENARO (generalized anxiety disorder)    Relevant Orders    CBC with platelets (Completed)      Other Visit Diagnoses     Myalgia    -  Primary    Relevant Orders    CRP, inflammation (Completed)    Anti Nuclear Hillary IgG by IFA with Reflex (Completed)    Erythrocyte sedimentation rate auto (Completed)    Cyclic Citrullinated Peptide Antibody IgG (Completed)    DNA double stranded antibodies (Completed)    Rheumatoid factor (Completed)    CLAY antibody panel (Completed)    Fatigue, unspecified type             Will get labs as ordered to rule out autoimmune conditions for myalgias.  Also get EMG to look for neurological causes of clonus.  Follow results and treat accordingly.    Trial this small small dose of Zoloft to help with anxiety as patient has had an anxiety attack during the exam in the clinic today.    Catalina Anderson MD  [facility]  Answers for HPI/ROS submitted by the patient on 2/13/2019   Chronic problems general questions HPI Form  If you checked off any problems, how difficult have these problems made it for you to do your work, take care of things at home, or get along with other people?: Not difficult at all  PHQ9 TOTAL SCORE: 2  GENARO 7 TOTAL SCORE: 6

## 2019-02-13 ENCOUNTER — OFFICE VISIT (OUTPATIENT)
Dept: FAMILY MEDICINE | Facility: OTHER | Age: 29
End: 2019-02-13
Payer: COMMERCIAL

## 2019-02-13 VITALS
TEMPERATURE: 98.4 F | OXYGEN SATURATION: 97 % | SYSTOLIC BLOOD PRESSURE: 118 MMHG | DIASTOLIC BLOOD PRESSURE: 74 MMHG | HEART RATE: 89 BPM | RESPIRATION RATE: 16 BRPM | HEIGHT: 69 IN | BODY MASS INDEX: 29.33 KG/M2 | WEIGHT: 198 LBS

## 2019-02-13 DIAGNOSIS — R53.83 FATIGUE, UNSPECIFIED TYPE: ICD-10-CM

## 2019-02-13 DIAGNOSIS — F41.1 GAD (GENERALIZED ANXIETY DISORDER): ICD-10-CM

## 2019-02-13 DIAGNOSIS — M79.10 MYALGIA: Primary | ICD-10-CM

## 2019-02-13 LAB
CRP SERPL-MCNC: <2.9 MG/L (ref 0–8)
ERYTHROCYTE [DISTWIDTH] IN BLOOD BY AUTOMATED COUNT: 13.7 % (ref 10–15)
ERYTHROCYTE [SEDIMENTATION RATE] IN BLOOD BY WESTERGREN METHOD: 4 MM/H (ref 0–15)
HCT VFR BLD AUTO: 42.3 % (ref 40–53)
HGB BLD-MCNC: 14.8 G/DL (ref 13.3–17.7)
MCH RBC QN AUTO: 30.3 PG (ref 26.5–33)
MCHC RBC AUTO-ENTMCNC: 35 G/DL (ref 31.5–36.5)
MCV RBC AUTO: 87 FL (ref 78–100)
PLATELET # BLD AUTO: 136 10E9/L (ref 150–450)
RBC # BLD AUTO: 4.88 10E12/L (ref 4.4–5.9)
WBC # BLD AUTO: 8.4 10E9/L (ref 4–11)

## 2019-02-13 PROCEDURE — 86200 CCP ANTIBODY: CPT | Performed by: FAMILY MEDICINE

## 2019-02-13 PROCEDURE — 86225 DNA ANTIBODY NATIVE: CPT | Performed by: FAMILY MEDICINE

## 2019-02-13 PROCEDURE — 86038 ANTINUCLEAR ANTIBODIES: CPT | Performed by: FAMILY MEDICINE

## 2019-02-13 PROCEDURE — 86140 C-REACTIVE PROTEIN: CPT | Performed by: FAMILY MEDICINE

## 2019-02-13 PROCEDURE — 86235 NUCLEAR ANTIGEN ANTIBODY: CPT | Performed by: FAMILY MEDICINE

## 2019-02-13 PROCEDURE — 86431 RHEUMATOID FACTOR QUANT: CPT | Performed by: FAMILY MEDICINE

## 2019-02-13 PROCEDURE — 85027 COMPLETE CBC AUTOMATED: CPT | Performed by: FAMILY MEDICINE

## 2019-02-13 PROCEDURE — 36415 COLL VENOUS BLD VENIPUNCTURE: CPT | Performed by: FAMILY MEDICINE

## 2019-02-13 PROCEDURE — 99214 OFFICE O/P EST MOD 30 MIN: CPT | Performed by: FAMILY MEDICINE

## 2019-02-13 PROCEDURE — 85652 RBC SED RATE AUTOMATED: CPT | Performed by: FAMILY MEDICINE

## 2019-02-13 ASSESSMENT — ANXIETY QUESTIONNAIRES
4. TROUBLE RELAXING: NOT AT ALL
5. BEING SO RESTLESS THAT IT IS HARD TO SIT STILL: SEVERAL DAYS
1. FEELING NERVOUS, ANXIOUS, OR ON EDGE: MORE THAN HALF THE DAYS
GAD7 TOTAL SCORE: 6
3. WORRYING TOO MUCH ABOUT DIFFERENT THINGS: SEVERAL DAYS
7. FEELING AFRAID AS IF SOMETHING AWFUL MIGHT HAPPEN: SEVERAL DAYS
GAD7 TOTAL SCORE: 6
7. FEELING AFRAID AS IF SOMETHING AWFUL MIGHT HAPPEN: SEVERAL DAYS
GAD7 TOTAL SCORE: 6
6. BECOMING EASILY ANNOYED OR IRRITABLE: NOT AT ALL
2. NOT BEING ABLE TO STOP OR CONTROL WORRYING: SEVERAL DAYS

## 2019-02-13 ASSESSMENT — PATIENT HEALTH QUESTIONNAIRE - PHQ9
SUM OF ALL RESPONSES TO PHQ QUESTIONS 1-9: 2
10. IF YOU CHECKED OFF ANY PROBLEMS, HOW DIFFICULT HAVE THESE PROBLEMS MADE IT FOR YOU TO DO YOUR WORK, TAKE CARE OF THINGS AT HOME, OR GET ALONG WITH OTHER PEOPLE: NOT DIFFICULT AT ALL
SUM OF ALL RESPONSES TO PHQ QUESTIONS 1-9: 2

## 2019-02-13 ASSESSMENT — MIFFLIN-ST. JEOR: SCORE: 1848.5

## 2019-02-14 LAB
ANA SER QL IF: NEGATIVE
CCP AB SER IA-ACNC: 1 U/ML
DSDNA AB SER-ACNC: 1 IU/ML
ENA RNP IGG SER IA-ACNC: <0.2 AI (ref 0–0.9)
ENA SCL70 IGG SER IA-ACNC: <0.2 AI (ref 0–0.9)
ENA SM IGG SER-ACNC: <0.2 AI (ref 0–0.9)
ENA SS-A IGG SER IA-ACNC: <0.2 AI (ref 0–0.9)
ENA SS-B IGG SER IA-ACNC: <0.2 AI (ref 0–0.9)
RHEUMATOID FACT SER NEPH-ACNC: <20 IU/ML (ref 0–20)

## 2019-02-14 ASSESSMENT — PATIENT HEALTH QUESTIONNAIRE - PHQ9: SUM OF ALL RESPONSES TO PHQ QUESTIONS 1-9: 2

## 2019-02-14 ASSESSMENT — ANXIETY QUESTIONNAIRES: GAD7 TOTAL SCORE: 6

## 2019-02-15 ENCOUNTER — TELEPHONE (OUTPATIENT)
Dept: FAMILY MEDICINE | Facility: OTHER | Age: 29
End: 2019-02-15

## 2019-02-15 DIAGNOSIS — R25.3 MUSCLE FASCICULATION: ICD-10-CM

## 2019-02-15 DIAGNOSIS — M79.605 PAIN OF LEFT LOWER EXTREMITY: Primary | ICD-10-CM

## 2019-02-15 NOTE — TELEPHONE ENCOUNTER
Patient notified of results, he would like to move forward with an EMG. I can help schedule when order has been placed. Thank you

## 2019-02-15 NOTE — TELEPHONE ENCOUNTER
Catalina Anderson MD  P [facility]             Please inform pt that all his inflammatory markers are normal. If he has persistent symptoms , I would recommend getting an EMG for further evaluation. If agreeable, I can place the orders

## 2019-02-15 NOTE — TELEPHONE ENCOUNTER
Lm for patient to call us back EMG has been ordered and this is done at our [place] location and he can call to schedule [phone]

## 2019-02-18 ENCOUNTER — TELEPHONE (OUTPATIENT)
Dept: FAMILY MEDICINE | Facility: OTHER | Age: 29
End: 2019-02-18

## 2019-02-18 NOTE — TELEPHONE ENCOUNTER
Reason for Call:  Other call back    Detailed comments: Patient is suppose to schedule with ENT and he can't get in until 3/15 and he would like to get in sooner some place else.  Please call    Phone Number Patient can be reached at: Home number on file [phone] (home)    Best Time: any    Can we leave a detailed message on this number? YES    Call taken on 2/18/2019 at 10:00 AM by Alma Velasco

## 2019-02-18 NOTE — TELEPHONE ENCOUNTER
Spoke to patient and it's an EMG he is looking for of getting in sooner. I told him that I will fax the order to [facility] they have a of locations to see if they can get him in for an EMG.   Fax is [phone].  I told  Him that I will fax to them and they will call him to schedule.

## 2019-02-19 ENCOUNTER — TELEPHONE (OUTPATIENT)
Dept: FAMILY MEDICINE | Facility: OTHER | Age: 29
End: 2019-02-19

## 2019-02-19 NOTE — TELEPHONE ENCOUNTER
Called back and per Dr. CRAVEN it should be only his left leg. Which I called and told [facility] that. Questions they will call back .

## 2019-02-19 NOTE — TELEPHONE ENCOUNTER
Reason for Call: Request for an order or referral:    Order or referral being requested: [facility].    Date needed: as soon as possible    Has the patient been seen by the PCP for this problem? YES    Additional comments: please call [facility]. They need to clarify which extremities for his appt. It only has demographics and associated diagnosis. He is scheduled for 2-21 at 2:15 in [place]    Phone number Patient can be reached at:  Other phone number:  kelly [phone]    Best Time:  any    Can we leave a detailed message on this number?  YES    Call taken on 2/19/2019 at 1:52 PM by Amarilis Drake

## 2019-02-20 NOTE — PROGRESS NOTES
SUBJECTIVE:   Shlomo Nelson is a 28 year old male who presents to clinic today for the following health issues:      History of Present Illness     Diet:  Regular (no restrictions)  Frequency of exercise:  2-3 days/week  Duration of exercise:  30-45 minutes  Taking medications regularly:  Yes  Medication side effects:  None  Additional concerns today:  No    Anxiety Follow-Up    Status since last visit: Improved     Other associated symptoms:None    Complicating factors:   Significant life event: No   Current substance abuse: None  Depression symptoms: No    Answers for HPI/ROS submitted by the patient on 2/27/2019   Chronic problems general questions HPI Form  GENARO 7 TOTAL SCORE: 4  If you checked off any problems, how difficult have these problems made it for you to do your work, take care of things at home, or get along with other people?: Not difficult at all  PHQ9 TOTAL SCORE: 2    GENARO-7 SCORE 9/14/2018 2/13/2019 2/27/2019   Total Score - - -   Total Score 6 (mild anxiety) 6 (mild anxiety) 4 (minimal anxiety)   Total Score 6 6 4       GENARO-7  Problem list and histories reviewed & adjusted, as indicated.  Additional history: as documented        Patient Active Problem List   Diagnosis     Muscle weakness (generalized)     Tobacco use disorder     Suprapubic abdominal pain     Thrombocytopenia (H)     Spleen enlargement     Urinary frequency     GENARO (generalized anxiety disorder)     Past Surgical History:   Procedure Laterality Date     FINGER SURGERY         Social History     Tobacco Use     Smoking status: Former Smoker     Smokeless tobacco: Former User     Types: Chew   Substance Use Topics     Alcohol use: Yes     Alcohol/week: 0.0 oz     Comment: twice a week; few drinks     Family History   Problem Relation Age of Onset     Lipids Father      Hypertension Maternal Grandmother      Hypertension Maternal Grandfather      Diabetes Paternal Grandmother      Hypertension Paternal Grandmother      Arthritis  Paternal Grandmother      Diabetes Paternal Grandfather      Hypertension Paternal Grandfather      Prostate Cancer Paternal Grandfather      Cancer - colorectal Paternal Grandfather      Cancer Paternal Grandfather         kidney     Gastrointestinal Disease Brother         IBS     Asthma No family hx of      C.A.D. No family hx of      Cerebrovascular Disease No family hx of      Alcohol/Drug No family hx of      Allergies No family hx of      Alzheimer Disease No family hx of      Anesthesia Reaction No family hx of      Blood Disease No family hx of      Cardiovascular No family hx of      Circulatory No family hx of      Congenital Anomalies No family hx of      Connective Tissue Disorder No family hx of      Depression No family hx of      Eye Disorder No family hx of      Genetic Disorder No family hx of      Genitourinary Problems No family hx of      Gynecology No family hx of      Heart Disease No family hx of      Musculoskeletal Disorder No family hx of      Neurologic Disorder No family hx of      Obesity No family hx of      Osteoporosis No family hx of      Psychotic Disorder No family hx of      Respiratory No family hx of      Thyroid Disease No family hx of      Hearing Loss No family hx of          Current Outpatient Medications   Medication Sig Dispense Refill     nicotine polacrilex (RA NICOTINE GUM) 2 MG gum Place 2 mg inside cheek as needed for smoking cessation       sertraline (ZOLOFT) 100 MG tablet Take 1 tablet (100 mg) by mouth daily       varenicline (CHANTIX) 1 MG tablet Take 1 mg by mouth 2 times daily       No Known Allergies  Recent Labs   Lab Test 03/20/18  1228 02/09/18  1044 07/11/17  1633 10/21/14  1528   LDL  --   --   --  150*   HDL  --   --   --  60   TRIG  --   --   --  103   ALT 36 37 42 28   CR 0.78 0.95 0.95 0.88   GFRESTIMATED >90 >90 >90  Non  GFR Calc   >90  Non  GFR Calc     GFRESTBLACK >90 >90 >90  African American GFR Calc    ">90   GFR Calc     POTASSIUM 4.0 4.4 3.8 4.0   TSH  --   --  1.99 1.94      BP Readings from Last 3 Encounters:   02/27/19 124/78   02/13/19 118/74   11/27/18 126/70    Wt Readings from Last 3 Encounters:   02/27/19 86.6 kg (191 lb)   02/13/19 89.8 kg (198 lb)   11/27/18 86.1 kg (189 lb 14.4 oz)                  Labs reviewed in EPIC    ROS:  Constitutional, HEENT, cardiovascular, pulmonary, GI, , musculoskeletal, neuro, skin, endocrine and psych systems are negative, except as otherwise noted.    OBJECTIVE:     /78 (BP Location: Left arm, Patient Position: Chair, Cuff Size: Adult Regular)   Pulse 76   Temp 98  F (36.7  C) (Temporal)   Resp 16   Ht 1.753 m (5' 9\")   Wt 86.6 kg (191 lb)   SpO2 98%   BMI 28.21 kg/m    Body mass index is 28.21 kg/m .   Physical Exam   Constitutional: He appears well-developed and well-nourished.   HENT:   Head: Normocephalic and atraumatic.   Eyes: EOM are normal.   Cardiovascular: Normal rate, regular rhythm and normal heart sounds.   Pulmonary/Chest: Effort normal and breath sounds normal. No stridor. No respiratory distress. He has no wheezes. He has no rales. He exhibits no tenderness.   Abdominal: Soft. Bowel sounds are normal.   Psychiatric: He has a normal mood and affect. His behavior is normal. Judgment and thought content normal.         Diagnostic Test Results:  none     ASSESSMENT/PLAN:     Problem List Items Addressed This Visit     Muscle weakness (generalized)     He continues to have mild pain and fasciculation in the left lower extremity but this seems to be significantly imporved since the last visit. He did have EMG completed but the results are not available. Will follow results and treat accordingly.          Thrombocytopenia (H)    GENARO (generalized anxiety disorder) - Primary     Improved but not at goal. Increase dose of zoloft to 100mg.          Relevant Medications    sertraline (ZOLOFT) 100 MG tablet           Catalina Anderson, " MD  [facility]

## 2019-02-21 ENCOUNTER — TRANSFERRED RECORDS (OUTPATIENT)
Dept: HEALTH INFORMATION MANAGEMENT | Facility: CLINIC | Age: 29
End: 2019-02-21

## 2019-02-27 ENCOUNTER — OFFICE VISIT (OUTPATIENT)
Dept: FAMILY MEDICINE | Facility: OTHER | Age: 29
End: 2019-02-27
Payer: COMMERCIAL

## 2019-02-27 VITALS
TEMPERATURE: 98 F | RESPIRATION RATE: 16 BRPM | DIASTOLIC BLOOD PRESSURE: 78 MMHG | HEIGHT: 69 IN | WEIGHT: 191 LBS | BODY MASS INDEX: 28.29 KG/M2 | HEART RATE: 76 BPM | SYSTOLIC BLOOD PRESSURE: 124 MMHG | OXYGEN SATURATION: 98 %

## 2019-02-27 DIAGNOSIS — D69.6 THROMBOCYTOPENIA (H): ICD-10-CM

## 2019-02-27 DIAGNOSIS — M62.81 MUSCLE WEAKNESS (GENERALIZED): ICD-10-CM

## 2019-02-27 DIAGNOSIS — F41.1 GAD (GENERALIZED ANXIETY DISORDER): Primary | ICD-10-CM

## 2019-02-27 LAB
BASOPHILS # BLD AUTO: 0 10E9/L (ref 0–0.2)
BASOPHILS NFR BLD AUTO: 0.4 %
DIFFERENTIAL METHOD BLD: ABNORMAL
EOSINOPHIL # BLD AUTO: 0.2 10E9/L (ref 0–0.7)
EOSINOPHIL NFR BLD AUTO: 2.3 %
ERYTHROCYTE [DISTWIDTH] IN BLOOD BY AUTOMATED COUNT: 13.7 % (ref 10–15)
HCT VFR BLD AUTO: 42.7 % (ref 40–53)
HGB BLD-MCNC: 14.8 G/DL (ref 13.3–17.7)
LYMPHOCYTES # BLD AUTO: 2.2 10E9/L (ref 0.8–5.3)
LYMPHOCYTES NFR BLD AUTO: 27.8 %
MCH RBC QN AUTO: 30.3 PG (ref 26.5–33)
MCHC RBC AUTO-ENTMCNC: 34.7 G/DL (ref 31.5–36.5)
MCV RBC AUTO: 87 FL (ref 78–100)
MONOCYTES # BLD AUTO: 0.7 10E9/L (ref 0–1.3)
MONOCYTES NFR BLD AUTO: 8.5 %
NEUTROPHILS # BLD AUTO: 4.7 10E9/L (ref 1.6–8.3)
NEUTROPHILS NFR BLD AUTO: 61 %
PLATELET # BLD AUTO: 108 10E9/L (ref 150–450)
RBC # BLD AUTO: 4.89 10E12/L (ref 4.4–5.9)
WBC # BLD AUTO: 7.7 10E9/L (ref 4–11)

## 2019-02-27 PROCEDURE — 85025 COMPLETE CBC W/AUTO DIFF WBC: CPT | Performed by: INTERNAL MEDICINE

## 2019-02-27 PROCEDURE — 36415 COLL VENOUS BLD VENIPUNCTURE: CPT | Performed by: INTERNAL MEDICINE

## 2019-02-27 PROCEDURE — 99214 OFFICE O/P EST MOD 30 MIN: CPT | Performed by: FAMILY MEDICINE

## 2019-02-27 RX ORDER — SERTRALINE HYDROCHLORIDE 100 MG/1
100 TABLET, FILM COATED ORAL DAILY
Start: 2019-02-27 | End: 2019-03-26

## 2019-02-27 ASSESSMENT — ANXIETY QUESTIONNAIRES
GAD7 TOTAL SCORE: 4
3. WORRYING TOO MUCH ABOUT DIFFERENT THINGS: SEVERAL DAYS
GAD7 TOTAL SCORE: 4
1. FEELING NERVOUS, ANXIOUS, OR ON EDGE: SEVERAL DAYS
5. BEING SO RESTLESS THAT IT IS HARD TO SIT STILL: NOT AT ALL
6. BECOMING EASILY ANNOYED OR IRRITABLE: NOT AT ALL
7. FEELING AFRAID AS IF SOMETHING AWFUL MIGHT HAPPEN: SEVERAL DAYS
GAD7 TOTAL SCORE: 4
2. NOT BEING ABLE TO STOP OR CONTROL WORRYING: SEVERAL DAYS
7. FEELING AFRAID AS IF SOMETHING AWFUL MIGHT HAPPEN: SEVERAL DAYS
4. TROUBLE RELAXING: NOT AT ALL

## 2019-02-27 ASSESSMENT — PATIENT HEALTH QUESTIONNAIRE - PHQ9
10. IF YOU CHECKED OFF ANY PROBLEMS, HOW DIFFICULT HAVE THESE PROBLEMS MADE IT FOR YOU TO DO YOUR WORK, TAKE CARE OF THINGS AT HOME, OR GET ALONG WITH OTHER PEOPLE: NOT DIFFICULT AT ALL
SUM OF ALL RESPONSES TO PHQ QUESTIONS 1-9: 2
SUM OF ALL RESPONSES TO PHQ QUESTIONS 1-9: 2

## 2019-02-27 ASSESSMENT — PAIN SCALES - GENERAL: PAINLEVEL: MILD PAIN (2)

## 2019-02-27 ASSESSMENT — MIFFLIN-ST. JEOR: SCORE: 1816.75

## 2019-02-28 ASSESSMENT — PATIENT HEALTH QUESTIONNAIRE - PHQ9: SUM OF ALL RESPONSES TO PHQ QUESTIONS 1-9: 2

## 2019-02-28 ASSESSMENT — ANXIETY QUESTIONNAIRES: GAD7 TOTAL SCORE: 4

## 2019-03-03 NOTE — ASSESSMENT & PLAN NOTE
He continues to have mild pain and fasciculation in the left lower extremity but this seems to be significantly imporved since the last visit. He did have EMG completed but the results are not available. Will follow results and treat accordingly.

## 2019-03-15 NOTE — PROGRESS NOTES
SUBJECTIVE:   Shlomo Nelson is a 29 year old male who presents to clinic today for the following health issues:      History of Present Illness     Depression & Anxiety Follow-up:     Depression/Anxiety:  Depression & Anxiety    Status since last visit::  Improved    Other associated symptoms of depression and anxiety::  None    Significant life event::  No    Current substance use::  None       Today's PHQ-9         PHQ-9 Total Score:     (P) 2   PHQ-9 Q9 Suicidal ideation:   (P) Not at all   Thoughts of suicide or self harm:      Self-harm Plan:        Self-harm Action:          Safety concerns for self or others:       GENARO-7 Total Score: (P) 2    Diet:  Other  Frequency of exercise:  1 day/week  Duration of exercise:  Less than 15 minutes  Taking medications regularly:  Yes  Medication side effects:  None  Additional concerns today:  No    Answers for HPI/ROS submitted by the patient on 3/26/2019   Chronic problems general questions HPI Form  GENARO 7 TOTAL SCORE: 2  If you checked off any problems, how difficult have these problems made it for you to do your work, take care of things at home, or get along with other people?: Not difficult at all  PHQ9 TOTAL SCORE: 2    -------------------------------------  Problem list and histories reviewed & adjusted, as indicated.  Additional history: as documented        Patient Active Problem List   Diagnosis     Muscle weakness (generalized)     Tobacco use disorder     Suprapubic abdominal pain     Thrombocytopenia (H)     Spleen enlargement     Urinary frequency     GENARO (generalized anxiety disorder)     Past Surgical History:   Procedure Laterality Date     FINGER SURGERY         Social History     Tobacco Use     Smoking status: Former Smoker     Smokeless tobacco: Former User     Types: Chew   Substance Use Topics     Alcohol use: Yes     Alcohol/week: 0.0 oz     Comment: twice a week; few drinks     Family History   Problem Relation Age of Onset     Lipids Father       Hypertension Maternal Grandmother      Hypertension Maternal Grandfather      Diabetes Paternal Grandmother      Hypertension Paternal Grandmother      Arthritis Paternal Grandmother      Diabetes Paternal Grandfather      Hypertension Paternal Grandfather      Prostate Cancer Paternal Grandfather      Cancer - colorectal Paternal Grandfather      Cancer Paternal Grandfather         kidney     Gastrointestinal Disease Brother         IBS     Asthma No family hx of      C.A.D. No family hx of      Cerebrovascular Disease No family hx of      Alcohol/Drug No family hx of      Allergies No family hx of      Alzheimer Disease No family hx of      Anesthesia Reaction No family hx of      Blood Disease No family hx of      Cardiovascular No family hx of      Circulatory No family hx of      Congenital Anomalies No family hx of      Connective Tissue Disorder No family hx of      Depression No family hx of      Eye Disorder No family hx of      Genetic Disorder No family hx of      Genitourinary Problems No family hx of      Gynecology No family hx of      Heart Disease No family hx of      Musculoskeletal Disorder No family hx of      Neurologic Disorder No family hx of      Obesity No family hx of      Osteoporosis No family hx of      Psychotic Disorder No family hx of      Respiratory No family hx of      Thyroid Disease No family hx of      Hearing Loss No family hx of          Current Outpatient Medications   Medication Sig Dispense Refill     nicotine polacrilex (RA NICOTINE GUM) 2 MG gum Place 2 mg inside cheek as needed for smoking cessation       sertraline (ZOLOFT) 50 MG tablet Take 1.5 tablets (75 mg) by mouth daily 135 tablet 1     varenicline (CHANTIX) 1 MG tablet Take 1 mg by mouth 2 times daily       No Known Allergies  Recent Labs   Lab Test 03/20/18  1228 02/09/18  1044 07/11/17  1633 10/21/14  1528   LDL  --   --   --  150*   HDL  --   --   --  60   TRIG  --   --   --  103   ALT 36 37 42 28   CR 0.78  "0.95 0.95 0.88   GFRESTIMATED >90 >90 >90  Non  GFR Calc   >90  Non  GFR Calc     GFRESTBLACK >90 >90 >90   GFR Calc   >90   GFR Calc     POTASSIUM 4.0 4.4 3.8 4.0   TSH  --   --  1.99 1.94      BP Readings from Last 3 Encounters:   03/26/19 122/72   02/27/19 124/78   02/13/19 118/74    Wt Readings from Last 3 Encounters:   03/26/19 90.3 kg (199 lb)   02/27/19 86.6 kg (191 lb)   02/13/19 89.8 kg (198 lb)                  Labs reviewed in EPIC    ROS:  Constitutional, HEENT, cardiovascular, pulmonary, GI, , musculoskeletal, neuro, skin, endocrine and psych systems are negative, except as otherwise noted.    OBJECTIVE:     /72 (BP Location: Right arm, Patient Position: Chair, Cuff Size: Adult Regular)   Pulse 81   Temp 97.8  F (36.6  C) (Temporal)   Resp 16   Ht 1.753 m (5' 9\")   Wt 90.3 kg (199 lb)   SpO2 98%   BMI 29.39 kg/m    Body mass index is 29.39 kg/m .   Physical Exam   Constitutional: He appears well-developed and well-nourished.   HENT:   Head: Normocephalic and atraumatic.   Cardiovascular: Normal rate, regular rhythm, normal heart sounds and intact distal pulses. Exam reveals no gallop and no friction rub.   No murmur heard.  Pulmonary/Chest: Effort normal and breath sounds normal.   Psychiatric: He has a normal mood and affect. His behavior is normal. Judgment and thought content normal.         Diagnostic Test Results:  none     ASSESSMENT/PLAN:     Problem List Items Addressed This Visit     Muscle weakness (generalized)     Reviewed results of EMG from the neurology clinic which showed normal conduction studies.  The weakness in his left lower extremity has improved.  Advised him watchful monitoring for now.  Given normal EMG I do not see the need to pursue further evaluation at this point.  Patient agreeable to this plan.         GENARO (generalized anxiety disorder) - Primary     Anxiety and depression are well controlled on " the current dose of Zoloft.  Continue Zaroxolyn 5 mg daily.  Recheck in 6 months for follow-up.         Relevant Medications    sertraline (ZOLOFT) 50 MG tablet             Catalina Anderson MD  [facility]  Answers for HPI/ROS submitted by the patient on 3/26/2019   Chronic problems general questions HPI Form  GENARO 7 TOTAL SCORE: 2  If you checked off any problems, how difficult have these problems made it for you to do your work, take care of things at home, or get along with other people?: Not difficult at all  PHQ9 TOTAL SCORE: 2

## 2019-03-26 ENCOUNTER — OFFICE VISIT (OUTPATIENT)
Dept: FAMILY MEDICINE | Facility: OTHER | Age: 29
End: 2019-03-26
Payer: COMMERCIAL

## 2019-03-26 VITALS
SYSTOLIC BLOOD PRESSURE: 122 MMHG | RESPIRATION RATE: 16 BRPM | WEIGHT: 199 LBS | OXYGEN SATURATION: 98 % | TEMPERATURE: 97.8 F | BODY MASS INDEX: 29.47 KG/M2 | HEART RATE: 81 BPM | DIASTOLIC BLOOD PRESSURE: 72 MMHG | HEIGHT: 69 IN

## 2019-03-26 DIAGNOSIS — M62.81 MUSCLE WEAKNESS (GENERALIZED): ICD-10-CM

## 2019-03-26 DIAGNOSIS — F41.1 GAD (GENERALIZED ANXIETY DISORDER): Primary | ICD-10-CM

## 2019-03-26 PROCEDURE — 99214 OFFICE O/P EST MOD 30 MIN: CPT | Performed by: FAMILY MEDICINE

## 2019-03-26 ASSESSMENT — ANXIETY QUESTIONNAIRES
2. NOT BEING ABLE TO STOP OR CONTROL WORRYING: NOT AT ALL
GAD7 TOTAL SCORE: 2
6. BECOMING EASILY ANNOYED OR IRRITABLE: NOT AT ALL
4. TROUBLE RELAXING: NOT AT ALL
GAD7 TOTAL SCORE: 2
7. FEELING AFRAID AS IF SOMETHING AWFUL MIGHT HAPPEN: SEVERAL DAYS
7. FEELING AFRAID AS IF SOMETHING AWFUL MIGHT HAPPEN: SEVERAL DAYS
3. WORRYING TOO MUCH ABOUT DIFFERENT THINGS: SEVERAL DAYS
GAD7 TOTAL SCORE: 2
1. FEELING NERVOUS, ANXIOUS, OR ON EDGE: NOT AT ALL
5. BEING SO RESTLESS THAT IT IS HARD TO SIT STILL: NOT AT ALL

## 2019-03-26 ASSESSMENT — PAIN SCALES - GENERAL: PAINLEVEL: NO PAIN (0)

## 2019-03-26 ASSESSMENT — MIFFLIN-ST. JEOR: SCORE: 1848.04

## 2019-03-26 NOTE — ASSESSMENT & PLAN NOTE
Reviewed results of EMG from the neurology clinic which showed normal conduction studies.  The weakness in his left lower extremity has improved.  Advised him watchful monitoring for now.  Given normal EMG I do not see the need to pursue further evaluation at this point.  Patient agreeable to this plan.

## 2019-03-26 NOTE — ASSESSMENT & PLAN NOTE
Anxiety and depression are well controlled on the current dose of Zoloft.  Continue Zaroxolyn 5 mg daily.  Recheck in 6 months for follow-up.

## 2019-03-27 ASSESSMENT — ANXIETY QUESTIONNAIRES: GAD7 TOTAL SCORE: 2

## 2019-03-27 ASSESSMENT — PATIENT HEALTH QUESTIONNAIRE - PHQ9: SUM OF ALL RESPONSES TO PHQ QUESTIONS 1-9: 2

## 2019-03-28 ENCOUNTER — TELEPHONE (OUTPATIENT)
Dept: FAMILY MEDICINE | Facility: OTHER | Age: 29
End: 2019-03-28

## 2019-03-28 NOTE — TELEPHONE ENCOUNTER
Prior Authorization Retail Medication Request    Medication/Dose: sertraline 50 mg tablets  ICD code (if different than what is on RX):    Previously Tried and Failed:    Rationale:      Insurance Name:    Insurance ID:        Pharmacy Information (if different than what is on RX)  Name:    Phone:

## 2019-04-02 NOTE — TELEPHONE ENCOUNTER
PA Initiation    Medication: sertraline 50 mg tablets- INITIATED  Insurance Company: [employer] - Phone [phone] Fax [phone]  Pharmacy Filling the Rx: [facility] [address] [place] [address] - [id number] AT [facility] OF  [address] MA[address]  Filling Pharmacy Phone: [phone]  Filling Pharmacy Fax:    Start Date: 4/2/2019

## 2019-04-03 NOTE — TELEPHONE ENCOUNTER
Prior Authorization Approval    Authorization Effective Date: 3/30/2019  Authorization Expiration Date: 3/30/2020  Medication: sertraline 50 mg tablets- APPROVED  Approved Dose/Quantity: 135 PER 90 DAYS  Reference #: [id number]   Insurance Company: [employer] - Phone [phone] Fax [phone]  Expected CoPay:       CoPay Card Available:      Foundation Assistance Needed:    Which Pharmacy is filling the prescription (Not needed for infusion/clinic administered): [facility] [address] [place] [address] [id number] AT [facility] OF [facility] [address]  Pharmacy Notified: Yes  Patient Notified:

## 2019-08-28 DIAGNOSIS — D69.6 THROMBOCYTOPENIA (H): ICD-10-CM

## 2019-08-28 LAB
BASOPHILS # BLD AUTO: 0 10E9/L (ref 0–0.2)
BASOPHILS NFR BLD AUTO: 0.5 %
DIFFERENTIAL METHOD BLD: ABNORMAL
EOSINOPHIL # BLD AUTO: 0.1 10E9/L (ref 0–0.7)
EOSINOPHIL NFR BLD AUTO: 1.8 %
ERYTHROCYTE [DISTWIDTH] IN BLOOD BY AUTOMATED COUNT: 13.1 % (ref 10–15)
HCT VFR BLD AUTO: 38.8 % (ref 40–53)
HGB BLD-MCNC: 13.7 G/DL (ref 13.3–17.7)
LYMPHOCYTES # BLD AUTO: 2.2 10E9/L (ref 0.8–5.3)
LYMPHOCYTES NFR BLD AUTO: 28.8 %
MCH RBC QN AUTO: 30.2 PG (ref 26.5–33)
MCHC RBC AUTO-ENTMCNC: 35.3 G/DL (ref 31.5–36.5)
MCV RBC AUTO: 86 FL (ref 78–100)
MONOCYTES # BLD AUTO: 0.6 10E9/L (ref 0–1.3)
MONOCYTES NFR BLD AUTO: 8.4 %
NEUTROPHILS # BLD AUTO: 4.6 10E9/L (ref 1.6–8.3)
NEUTROPHILS NFR BLD AUTO: 60.5 %
PLATELET # BLD AUTO: 119 10E9/L (ref 150–450)
RBC # BLD AUTO: 4.54 10E12/L (ref 4.4–5.9)
WBC # BLD AUTO: 7.7 10E9/L (ref 4–11)

## 2019-08-28 PROCEDURE — 36415 COLL VENOUS BLD VENIPUNCTURE: CPT | Performed by: INTERNAL MEDICINE

## 2019-08-28 PROCEDURE — 85025 COMPLETE CBC W/AUTO DIFF WBC: CPT | Performed by: INTERNAL MEDICINE

## 2019-11-06 ENCOUNTER — HEALTH MAINTENANCE LETTER (OUTPATIENT)
Age: 29
End: 2019-11-06

## 2019-11-26 ENCOUNTER — ONCOLOGY VISIT (OUTPATIENT)
Dept: ONCOLOGY | Facility: CLINIC | Age: 29
End: 2019-11-26
Payer: COMMERCIAL

## 2019-11-26 VITALS
DIASTOLIC BLOOD PRESSURE: 68 MMHG | BODY MASS INDEX: 28.81 KG/M2 | RESPIRATION RATE: 18 BRPM | HEIGHT: 69 IN | OXYGEN SATURATION: 99 % | TEMPERATURE: 97.2 F | SYSTOLIC BLOOD PRESSURE: 120 MMHG | WEIGHT: 194.5 LBS | HEART RATE: 73 BPM

## 2019-11-26 DIAGNOSIS — D69.6 THROMBOCYTOPENIA (H): Primary | ICD-10-CM

## 2019-11-26 DIAGNOSIS — R16.1 SPLEEN ENLARGEMENT: ICD-10-CM

## 2019-11-26 LAB
BASOPHILS # BLD AUTO: 0 10E9/L (ref 0–0.2)
BASOPHILS NFR BLD AUTO: 0.6 %
DIFFERENTIAL METHOD BLD: ABNORMAL
EOSINOPHIL NFR BLD AUTO: 2 %
ERYTHROCYTE [DISTWIDTH] IN BLOOD BY AUTOMATED COUNT: 12.7 % (ref 10–15)
HCT VFR BLD AUTO: 41.6 % (ref 40–53)
HGB BLD-MCNC: 14.5 G/DL (ref 13.3–17.7)
IMM GRANULOCYTES # BLD: 0.1 10E9/L (ref 0–0.4)
IMM GRANULOCYTES NFR BLD: 1.7 %
LYMPHOCYTES # BLD AUTO: 2 10E9/L (ref 0.8–5.3)
LYMPHOCYTES NFR BLD AUTO: 29.9 %
MCH RBC QN AUTO: 30 PG (ref 26.5–33)
MCHC RBC AUTO-ENTMCNC: 34.9 G/DL (ref 31.5–36.5)
MCV RBC AUTO: 86 FL (ref 78–100)
MONOCYTES # BLD AUTO: 0.6 10E9/L (ref 0–1.3)
MONOCYTES NFR BLD AUTO: 9.7 %
NEUTROPHILS # BLD AUTO: 3.7 10E9/L (ref 1.6–8.3)
NEUTROPHILS NFR BLD AUTO: 56.1 %
NRBC # BLD AUTO: 0 10*3/UL
NRBC BLD AUTO-RTO: 0 /100
PLATELET # BLD AUTO: 113 10E9/L (ref 150–450)
RBC # BLD AUTO: 4.83 10E12/L (ref 4.4–5.9)
WBC # BLD AUTO: 6.6 10E9/L (ref 4–11)

## 2019-11-26 PROCEDURE — 99213 OFFICE O/P EST LOW 20 MIN: CPT | Performed by: INTERNAL MEDICINE

## 2019-11-26 PROCEDURE — 85025 COMPLETE CBC W/AUTO DIFF WBC: CPT | Performed by: INTERNAL MEDICINE

## 2019-11-26 PROCEDURE — 36415 COLL VENOUS BLD VENIPUNCTURE: CPT | Performed by: INTERNAL MEDICINE

## 2019-11-26 ASSESSMENT — MIFFLIN-ST. JEOR: SCORE: 1827.63

## 2019-11-26 NOTE — NURSING NOTE
"Oncology Rooming Note    November 26, 2019 8:35 AM   Shlomo Nelson is a 29 year old male who presents for:    Chief Complaint   Patient presents with     Hematology     1 year follow up-Thrombocytopenia     Results     labs today     Initial Vitals: /68   Pulse 73   Temp 97.2  F (36.2  C) (Temporal)   Resp 18   Ht 1.753 m (5' 9\")   Wt 88.2 kg (194 lb 8 oz)   SpO2 99%   BMI 28.72 kg/m   Estimated body mass index is 28.72 kg/m  as calculated from the following:    Height as of this encounter: 1.753 m (5' 9\").    Weight as of this encounter: 88.2 kg (194 lb 8 oz). Body surface area is 2.07 meters squared.  Data Unavailable Comment: Data Unavailable   No LMP for male patient.  Allergies reviewed: Yes  Medications reviewed: Yes    Medications: Medication refills not needed today.  Pharmacy name entered into [facility]:    [id number] - [place], [place] - [address] [address] [facility] AT [facility] OF [facility] [address]  [facility] [place], [place] - [address] [facility]    Clinical concerns: none.      5 minutes for nursing intake (face to face time)     Kasey SHELBY CMA              "

## 2019-11-26 NOTE — NURSING NOTE
DISCHARGE PLAN:  Next appointments: See patient instruction section  Departure Mode: Ambulatory  Accompanied by: self  0 minutes for nursing discharge (face to face time)     Shlomo Nelson is here today for 1 year follow up for Thrombocytopenia.  Patient was not seen by writing nurse at time of appointment. Patient to follow up with primary care provider. Follow up with Dr. Fang as needed. Patient to schedule appointments.  See patient instructions and Oncologist's Progress note for further details. Questions and concerns addressed to patient's satisfaction. Patient verbalized and demonstrated understanding of plan.  Contact information provided and patient is encouraged to call with any that arise in the interim of care.    Naz Hernandez RN   Oncology Care Coordinator RN  [facility]  [phone]  11/26/2019, 8:39 AM

## 2019-11-26 NOTE — PROGRESS NOTES
DATE OF VISIT: Nov 26, 2019    Shlomo Nelson is a 29 year old male is seen today for   Chief Complaint   Patient presents with     Hematology     1 year follow up-Thrombocytopenia     Results     labs today   .       (D69.6) Thrombocytopenia (H)  (primary encounter diagnosis)  Reviewed with the patient today most recent laboratory test.  Platelet count has been stable.  Patient denies any bruising or bleeding.  Patient remains a symptomatic without any weight loss or night sweats or fever or chills.  Denies any skin rash.  I recommend patient to abstain from alcohol use.  Patient will continue to monitor platelet count with primary care physician.  I will see the patient again if there is new developments or concerns.    The patient is ready to learn, no apparent learning barriers were identified.  Diagnosis and treatment plans were explained to the patient. The patient expressed understanding of the content. The patient asked appropriate questions. The patient questions were answered to his satisfaction.  Time spent 15 minutes more than 50% of the time in counseling and coordination of care including discussion of management of thrombocytopenia, follow-up and prognosis.  Chart documentation with Dragon Voice recognition Software. Although reviewed after completion, some words and grammatical errors may remain.

## 2019-11-26 NOTE — PATIENT INSTRUCTIONS
Today:  Labs!    Please follow up with your Primary care provider.    Follow up with Dr. Fang as needed.      If you have any questions or concerns please feel free to call.    If you need to reschedule please call:  Clinic or Lab Appointment - [phone]  Infusion - [phone]  Imaging - [phone]    Naz Hernandez RN  [facility]   Oncology/Hematology Care Coordinator RN  [facility]  [phone]

## 2019-11-26 NOTE — LETTER
11/26/2019         RE: Shlomo Nelson  [address]  [place] [address]        Dear Colleague,    Thank you for referring your patient, Shlomo Nelson, to the [facility]. Please see a copy of my visit note below.    DATE OF VISIT: Nov 26, 2019    Shlomo Nelson is a 29 year old male is seen today for   Chief Complaint   Patient presents with     Hematology     1 year follow up-Thrombocytopenia     Results     labs today   .       (D69.6) Thrombocytopenia (H)  (primary encounter diagnosis)  Reviewed with the patient today most recent laboratory test.  Platelet count has been stable.  Patient denies any bruising or bleeding.  Patient remains a symptomatic without any weight loss or night sweats or fever or chills.  Denies any skin rash.  I recommend patient to abstain from alcohol use.  Patient will continue to monitor platelet count with primary care physician.  I will see the patient again if there is new developments or concerns.    The patient is ready to learn, no apparent learning barriers were identified.  Diagnosis and treatment plans were explained to the patient. The patient expressed understanding of the content. The patient asked appropriate questions. The patient questions were answered to his satisfaction.  Time spent 15 minutes more than 50% of the time in counseling and coordination of care including discussion of management of thrombocytopenia, follow-up and prognosis.  Chart documentation with Dragon Voice recognition Software. Although reviewed after completion, some words and grammatical errors may remain.    Again, thank you for allowing me to participate in the care of your patient.        Sincerely,        Shari Fang MD

## 2020-11-29 ENCOUNTER — HEALTH MAINTENANCE LETTER (OUTPATIENT)
Age: 30
End: 2020-11-29

## 2021-02-23 ENCOUNTER — OFFICE VISIT (OUTPATIENT)
Dept: FAMILY MEDICINE | Facility: OTHER | Age: 31
End: 2021-02-23
Payer: COMMERCIAL

## 2021-02-23 VITALS
WEIGHT: 195.6 LBS | DIASTOLIC BLOOD PRESSURE: 90 MMHG | HEART RATE: 70 BPM | OXYGEN SATURATION: 99 % | TEMPERATURE: 97.9 F | RESPIRATION RATE: 16 BRPM | BODY MASS INDEX: 28.89 KG/M2 | SYSTOLIC BLOOD PRESSURE: 120 MMHG

## 2021-02-23 DIAGNOSIS — D69.6 THROMBOCYTOPENIA (H): ICD-10-CM

## 2021-02-23 DIAGNOSIS — F41.1 GAD (GENERALIZED ANXIETY DISORDER): ICD-10-CM

## 2021-02-23 DIAGNOSIS — Z13.220 SCREENING FOR LIPOID DISORDERS: ICD-10-CM

## 2021-02-23 DIAGNOSIS — R59.1 LYMPHADENOPATHY: Primary | ICD-10-CM

## 2021-02-23 LAB
ANION GAP SERPL CALCULATED.3IONS-SCNC: 6 MMOL/L (ref 3–14)
BUN SERPL-MCNC: 13 MG/DL (ref 7–30)
CALCIUM SERPL-MCNC: 9 MG/DL (ref 8.5–10.1)
CHLORIDE SERPL-SCNC: 104 MMOL/L (ref 94–109)
CHOLEST SERPL-MCNC: 228 MG/DL
CO2 SERPL-SCNC: 28 MMOL/L (ref 20–32)
CREAT SERPL-MCNC: 0.81 MG/DL (ref 0.66–1.25)
ERYTHROCYTE [DISTWIDTH] IN BLOOD BY AUTOMATED COUNT: 13.8 % (ref 10–15)
GFR SERPL CREATININE-BSD FRML MDRD: >90 ML/MIN/{1.73_M2}
GLUCOSE SERPL-MCNC: 105 MG/DL (ref 70–99)
HCT VFR BLD AUTO: 38.4 % (ref 40–53)
HDLC SERPL-MCNC: 47 MG/DL
HGB BLD-MCNC: 13.5 G/DL (ref 13.3–17.7)
LDLC SERPL CALC-MCNC: 130 MG/DL
MCH RBC QN AUTO: 30.1 PG (ref 26.5–33)
MCHC RBC AUTO-ENTMCNC: 35.2 G/DL (ref 31.5–36.5)
MCV RBC AUTO: 86 FL (ref 78–100)
NONHDLC SERPL-MCNC: 181 MG/DL
PLATELET # BLD AUTO: 115 10E9/L (ref 150–450)
POTASSIUM SERPL-SCNC: 4.2 MMOL/L (ref 3.4–5.3)
RBC # BLD AUTO: 4.49 10E12/L (ref 4.4–5.9)
SODIUM SERPL-SCNC: 138 MMOL/L (ref 133–144)
TRIGL SERPL-MCNC: 257 MG/DL
TSH SERPL DL<=0.005 MIU/L-ACNC: 1.48 MU/L (ref 0.4–4)
WBC # BLD AUTO: 6.1 10E9/L (ref 4–11)

## 2021-02-23 PROCEDURE — 84443 ASSAY THYROID STIM HORMONE: CPT | Performed by: PHYSICIAN ASSISTANT

## 2021-02-23 PROCEDURE — 80061 LIPID PANEL: CPT | Performed by: PHYSICIAN ASSISTANT

## 2021-02-23 PROCEDURE — 80048 BASIC METABOLIC PNL TOTAL CA: CPT | Performed by: PHYSICIAN ASSISTANT

## 2021-02-23 PROCEDURE — 85027 COMPLETE CBC AUTOMATED: CPT | Performed by: PHYSICIAN ASSISTANT

## 2021-02-23 PROCEDURE — 36415 COLL VENOUS BLD VENIPUNCTURE: CPT | Performed by: PHYSICIAN ASSISTANT

## 2021-02-23 PROCEDURE — 99214 OFFICE O/P EST MOD 30 MIN: CPT | Performed by: PHYSICIAN ASSISTANT

## 2021-02-23 ASSESSMENT — PAIN SCALES - GENERAL: PAINLEVEL: MILD PAIN (2)

## 2021-02-23 NOTE — PATIENT INSTRUCTIONS
1.  Start Sertraline 50 mg once a day     2. Recheck 1-2 months     3. Labs today, await results     4. Monitor for 1-2 weeks, if persists, call Sandor and will get ultrasound of neck     5. Neck stretches - 2-3x/day     6. Ibuprofen 600 mg (3 tablets) three times a day for 5-7 days

## 2021-02-23 NOTE — PROGRESS NOTES
Assessment & Plan     ICD-10-CM    1. Lymphadenopathy  R59.1 TSH with free T4 reflex     CBC with platelets     Basic metabolic panel  (Ca, Cl, CO2, Creat, Gluc, K, Na, BUN)   2. Thrombocytopenia (H)  D69.6 CBC with platelets   3. GENARO (generalized anxiety disorder)  F41.1 sertraline (ZOLOFT) 50 MG tablet   4. Screening for lipoid disorders  Z13.220 Lipid panel reflex to direct LDL Fasting     1.   - Patient reporting mass in neck that hurts with yelling and sometimes swallowing   - On exam small lymph node palpated, seems to be superior to thyroid      Likely superficial cervical   - Will get labs to rule out other etiology or infection   - Await results   - Recommend continued monitoring   - If persists, will get ultrasound     2. Due for yearly recheck, await results     3. Anxiety   - Stable on current regimen of Sertraline, reviewed use and side effects, refilled     4. Due for routine check      Review of the result(s) of each unique test - See list        11/26/19 - CBC        8/28/19 - CBC   Diagnosis or treatment significantly limited by social determinants of health - tobacco use     30 minutes spent on the date of the encounter doing chart review, history and exam, documentation and further activities as noted above    The patient indicates understanding of these issues and agrees with the plan.    Return in about 1 month (around 3/23/2021) for Recheck.    JUAN Sullivan [facility]        Mariela Keenan is a 30 year old who presents for the following health issues     HPI   Neck Pain  Onset/Duration: 10 days   Description:   Location: right side   Radiation: to the forehead and jaw  Intensity: 2/10 a worst 5/10  Progression of Symptoms:  worsening and intermittent  Accompanying Signs & Symptoms:  Burning, tingling, prickly sensation in arm(s): no  Numbness in arm(s): no  Weakness in arm(s):  no  Fever: no  Headache: no  Nausea and/or vomiting:  no  History:   Trauma: no  Previous neck pain: no  Previous surgery or injections: no  Previous Imaging (MRI,X ray): no  Precipitating or alleviating factors: None  Does movement impact the pain:  YES  Therapies tried and outcome: nothing- states its deep    - Hurts to yell and swallow   - To the side of windpipe   - Isn't in a throat, feels on the outside       Review of Systems   Constitutional, HEENT, cardiovascular, pulmonary, gi and gu systems are negative, except as otherwise noted.      Objective    BP (!) 120/90   Pulse 70   Temp 97.9  F (36.6  C) (Temporal)   Resp 16   Wt 88.7 kg (195 lb 9.6 oz)   SpO2 99%   BMI 28.89 kg/m    Body mass index is 28.89 kg/m .  Physical Exam   GENERAL APPEARANCE: healthy, alert and no distress  EYES: Eyes grossly normal to inspection, PERRLA, conjunctivae and sclerae without injection or discharge, EOM intact   HENT: Bilateral ear canals without erythema or cerumen, bilateral TM's pearly grey with normal light reflex, no effusion, injection, or bulging, nasal turbinates without swelling, erythema, or discharge, mouth without ulcers or lesions, oropharynx clear and oral mucous membranes moist, no sinus tenderness   NECK: There is a small mass located to the right side of trachea and laterally to thyroid cartilage, soft, not tender, movable    No adenopathy in cervical, supraclavicular, or axillary regions, no asymmetry, masses, or scars, and thyroid normal to palpation, no JVD   RESP: Lungs clear to auscultation - no rales, rhonchi or wheezes   CV: Regular rates and rhythm, normal S1 S2, no S3 or S4, no murmur, click or rub, no peripheral edema and peripheral pulses strong and symmetric bilaterally   MS: No musculoskeletal defects are noted and gait is age appropriate without ataxia   SKIN: No suspicious lesions or rashes, hydration status appears adeuqate with normal skin turgor   PSYCH: Alert and oriented x3; speech- coherent , normal rate and volume; able to articulate  logical thoughts, able to abstract reason, no tangential thoughts, no hallucinations or delusions, mentation appears normal, Mood is euthymic. Affect is appropriate for this mood state and bright. Thought content is free of suicidal ideation, hallucinations, and delusions. Dress is adequate and upkept. Eye contact is good during conversation.     Diagnostics  Reviewed in Epic  See orders pending in Epic

## 2021-02-24 ENCOUNTER — MYC MEDICAL ADVICE (OUTPATIENT)
Dept: FAMILY MEDICINE | Facility: OTHER | Age: 31
End: 2021-02-24

## 2021-02-24 DIAGNOSIS — R59.1 LYMPHADENOPATHY: Primary | ICD-10-CM

## 2021-02-24 NOTE — RESULT ENCOUNTER NOTE
Helnanci Shlomo    Your results were normal. Cholesterol actually better than last checked, still in that borderline range so we should check it yearly.     The results are attached for your review.       Sandor Moralez PA-C

## 2021-02-24 NOTE — TELEPHONE ENCOUNTER
Patient requesting US, but note stated reach out in 1-2 weeks. No order placed as of yet.   Will route to CDL to review.   Susan Singh MA

## 2021-02-25 ENCOUNTER — MYC MEDICAL ADVICE (OUTPATIENT)
Dept: FAMILY MEDICINE | Facility: OTHER | Age: 31
End: 2021-02-25

## 2021-02-25 ENCOUNTER — ANCILLARY PROCEDURE (OUTPATIENT)
Dept: ULTRASOUND IMAGING | Facility: CLINIC | Age: 31
End: 2021-02-25
Attending: PHYSICIAN ASSISTANT
Payer: COMMERCIAL

## 2021-02-25 DIAGNOSIS — R59.1 LYMPHADENOPATHY: ICD-10-CM

## 2021-02-25 PROCEDURE — 76536 US EXAM OF HEAD AND NECK: CPT | Performed by: RADIOLOGY

## 2021-02-25 NOTE — RESULT ENCOUNTER NOTE
Kirti Keenan    Your results were normal. Mass you feel is consistent with lymph node.     The results are attached for your review.       Sandor Moralez PA-C

## 2021-05-21 ENCOUNTER — IMMUNIZATION (OUTPATIENT)
Dept: LAB | Facility: OTHER | Age: 31
End: 2021-05-21
Payer: COMMERCIAL

## 2021-09-19 ENCOUNTER — HEALTH MAINTENANCE LETTER (OUTPATIENT)
Age: 31
End: 2021-09-19

## 2022-01-09 ENCOUNTER — HEALTH MAINTENANCE LETTER (OUTPATIENT)
Age: 32
End: 2022-01-09

## 2022-04-01 ASSESSMENT — ANXIETY QUESTIONNAIRES
7. FEELING AFRAID AS IF SOMETHING AWFUL MIGHT HAPPEN: MORE THAN HALF THE DAYS
7. FEELING AFRAID AS IF SOMETHING AWFUL MIGHT HAPPEN: MORE THAN HALF THE DAYS
GAD7 TOTAL SCORE: 7
2. NOT BEING ABLE TO STOP OR CONTROL WORRYING: SEVERAL DAYS
GAD7 TOTAL SCORE: 7
5. BEING SO RESTLESS THAT IT IS HARD TO SIT STILL: NOT AT ALL
1. FEELING NERVOUS, ANXIOUS, OR ON EDGE: SEVERAL DAYS
3. WORRYING TOO MUCH ABOUT DIFFERENT THINGS: SEVERAL DAYS
6. BECOMING EASILY ANNOYED OR IRRITABLE: SEVERAL DAYS
4. TROUBLE RELAXING: SEVERAL DAYS
GAD7 TOTAL SCORE: 7

## 2022-04-02 ASSESSMENT — ANXIETY QUESTIONNAIRES: GAD7 TOTAL SCORE: 7

## 2022-04-04 ENCOUNTER — OFFICE VISIT (OUTPATIENT)
Dept: FAMILY MEDICINE | Facility: OTHER | Age: 32
End: 2022-04-04
Payer: COMMERCIAL

## 2022-04-04 VITALS
TEMPERATURE: 97 F | BODY MASS INDEX: 29.83 KG/M2 | DIASTOLIC BLOOD PRESSURE: 100 MMHG | OXYGEN SATURATION: 98 % | WEIGHT: 202 LBS | SYSTOLIC BLOOD PRESSURE: 150 MMHG | HEART RATE: 78 BPM | RESPIRATION RATE: 18 BRPM

## 2022-04-04 DIAGNOSIS — I10 HYPERTENSION GOAL BP (BLOOD PRESSURE) < 130/80: ICD-10-CM

## 2022-04-04 DIAGNOSIS — D69.6 THROMBOCYTOPENIA (H): ICD-10-CM

## 2022-04-04 DIAGNOSIS — R73.09 ELEVATED GLUCOSE: ICD-10-CM

## 2022-04-04 DIAGNOSIS — F41.1 GAD (GENERALIZED ANXIETY DISORDER): Primary | ICD-10-CM

## 2022-04-04 DIAGNOSIS — F10.10 ALCOHOL ABUSE, EPISODIC DRINKING BEHAVIOR: ICD-10-CM

## 2022-04-04 DIAGNOSIS — E78.5 HYPERLIPIDEMIA LDL GOAL <100: ICD-10-CM

## 2022-04-04 DIAGNOSIS — R03.0 ELEVATED BP WITHOUT DIAGNOSIS OF HYPERTENSION: ICD-10-CM

## 2022-04-04 DIAGNOSIS — Z72.0 TOBACCO USE: ICD-10-CM

## 2022-04-04 LAB
ALBUMIN SERPL-MCNC: 4.5 G/DL (ref 3.4–5)
ALP SERPL-CCNC: 71 U/L (ref 40–150)
ALT SERPL W P-5'-P-CCNC: 37 U/L (ref 0–70)
ANION GAP SERPL CALCULATED.3IONS-SCNC: 5 MMOL/L (ref 3–14)
AST SERPL W P-5'-P-CCNC: 18 U/L (ref 0–45)
BILIRUB SERPL-MCNC: 0.4 MG/DL (ref 0.2–1.3)
BUN SERPL-MCNC: 12 MG/DL (ref 7–30)
CALCIUM SERPL-MCNC: 8.5 MG/DL (ref 8.5–10.1)
CHLORIDE BLD-SCNC: 106 MMOL/L (ref 94–109)
CHOLEST SERPL-MCNC: 219 MG/DL
CO2 SERPL-SCNC: 27 MMOL/L (ref 20–32)
CREAT SERPL-MCNC: 0.72 MG/DL (ref 0.66–1.25)
ERYTHROCYTE [DISTWIDTH] IN BLOOD BY AUTOMATED COUNT: 13.5 % (ref 10–15)
FASTING STATUS PATIENT QL REPORTED: NO
GFR SERPL CREATININE-BSD FRML MDRD: >90 ML/MIN/1.73M2
GLUCOSE BLD-MCNC: 93 MG/DL (ref 70–99)
HBA1C MFR BLD: 4.8 % (ref 0–5.6)
HCT VFR BLD AUTO: 37.9 % (ref 40–53)
HDLC SERPL-MCNC: 39 MG/DL
HGB BLD-MCNC: 13.4 G/DL (ref 13.3–17.7)
LDLC SERPL CALC-MCNC: 112 MG/DL
LDLC SERPL CALC-MCNC: ABNORMAL MG/DL
MCH RBC QN AUTO: 30.5 PG (ref 26.5–33)
MCHC RBC AUTO-ENTMCNC: 35.4 G/DL (ref 31.5–36.5)
MCV RBC AUTO: 86 FL (ref 78–100)
NONHDLC SERPL-MCNC: 180 MG/DL
PLATELET # BLD AUTO: 128 10E3/UL (ref 150–450)
POTASSIUM BLD-SCNC: 3.6 MMOL/L (ref 3.4–5.3)
PROT SERPL-MCNC: 7.5 G/DL (ref 6.8–8.8)
RBC # BLD AUTO: 4.4 10E6/UL (ref 4.4–5.9)
SODIUM SERPL-SCNC: 138 MMOL/L (ref 133–144)
TRIGL SERPL-MCNC: 532 MG/DL
TSH SERPL DL<=0.005 MIU/L-ACNC: 1.42 MU/L (ref 0.4–4)
WBC # BLD AUTO: 7.4 10E3/UL (ref 4–11)

## 2022-04-04 PROCEDURE — 80061 LIPID PANEL: CPT | Performed by: PHYSICIAN ASSISTANT

## 2022-04-04 PROCEDURE — 83036 HEMOGLOBIN GLYCOSYLATED A1C: CPT | Performed by: PHYSICIAN ASSISTANT

## 2022-04-04 PROCEDURE — 80053 COMPREHEN METABOLIC PANEL: CPT | Performed by: PHYSICIAN ASSISTANT

## 2022-04-04 PROCEDURE — 99214 OFFICE O/P EST MOD 30 MIN: CPT | Performed by: PHYSICIAN ASSISTANT

## 2022-04-04 PROCEDURE — 83721 ASSAY OF BLOOD LIPOPROTEIN: CPT | Mod: 59 | Performed by: PHYSICIAN ASSISTANT

## 2022-04-04 PROCEDURE — 36415 COLL VENOUS BLD VENIPUNCTURE: CPT | Performed by: PHYSICIAN ASSISTANT

## 2022-04-04 PROCEDURE — 96127 BRIEF EMOTIONAL/BEHAV ASSMT: CPT | Mod: 59 | Performed by: PHYSICIAN ASSISTANT

## 2022-04-04 PROCEDURE — 82306 VITAMIN D 25 HYDROXY: CPT | Performed by: PHYSICIAN ASSISTANT

## 2022-04-04 PROCEDURE — 85027 COMPLETE CBC AUTOMATED: CPT | Performed by: PHYSICIAN ASSISTANT

## 2022-04-04 PROCEDURE — 84443 ASSAY THYROID STIM HORMONE: CPT | Performed by: PHYSICIAN ASSISTANT

## 2022-04-04 RX ORDER — SERTRALINE HYDROCHLORIDE 100 MG/1
100 TABLET, FILM COATED ORAL DAILY
Qty: 90 TABLET | Refills: 1 | Status: SHIPPED | OUTPATIENT
Start: 2022-04-04 | End: 2022-04-18

## 2022-04-04 RX ORDER — LISINOPRIL 10 MG/1
10 TABLET ORAL DAILY
Qty: 30 TABLET | Refills: 1 | Status: SHIPPED | OUTPATIENT
Start: 2022-04-04 | End: 2022-04-18

## 2022-04-04 ASSESSMENT — PATIENT HEALTH QUESTIONNAIRE - PHQ9: SUM OF ALL RESPONSES TO PHQ QUESTIONS 1-9: 5

## 2022-04-04 ASSESSMENT — ANXIETY QUESTIONNAIRES: GAD7 TOTAL SCORE: 7

## 2022-04-04 ASSESSMENT — PAIN SCALES - GENERAL: PAINLEVEL: NO PAIN (0)

## 2022-04-04 NOTE — PATIENT INSTRUCTIONS
- Increase Sertraline to 100 mg     - Start Lisinopril 10 mg in AM     - Monitor BP and pulse, bring log to follow up      Send Sandor BP log in 1 week         - Recheck in 2 weeks

## 2022-04-04 NOTE — PROGRESS NOTES
Assessment & Plan     ICD-10-CM    1. GENARO (generalized anxiety disorder)  F41.1 Vitamin D Deficiency     sertraline (ZOLOFT) 100 MG tablet     Vitamin D Deficiency   2. Thrombocytopenia (H)  D69.6 CBC with platelets     CBC with platelets   3. Hyperlipidemia LDL goal <100  E78.5 Lipid panel reflex to direct LDL Non-fasting     Comprehensive metabolic panel (BMP + Alb, Alk Phos, ALT, AST, Total. Bili, TP)     Lipid panel reflex to direct LDL Non-fasting     Comprehensive metabolic panel (BMP + Alb, Alk Phos, ALT, AST, Total. Bili, TP)     LDL cholesterol direct   4. Hypertension goal BP (blood pressure) < 130/80  I10    5. Elevated BP without diagnosis of hypertension  R03.0 TSH with free T4 reflex     Comprehensive metabolic panel (BMP + Alb, Alk Phos, ALT, AST, Total. Bili, TP)     lisinopril (ZESTRIL) 10 MG tablet     TSH with free T4 reflex     Comprehensive metabolic panel (BMP + Alb, Alk Phos, ALT, AST, Total. Bili, TP)   6. Elevated glucose  R73.09 Comprehensive metabolic panel (BMP + Alb, Alk Phos, ALT, AST, Total. Bili, TP)     Hemoglobin A1c     Comprehensive metabolic panel (BMP + Alb, Alk Phos, ALT, AST, Total. Bili, TP)     Hemoglobin A1c   7. Alcohol abuse, episodic drinking behavior  F10.10    8. Tobacco use  Z72.0      - Patient here to follow up on labs as well as discuss elevated blood pressure and worsening mood   - BPs have been elevated at home and very elevated today in clinic, chart review shows elevated BPs for the last few years   - Recommend labs to rule out reversible causes of HTN, though likely genetic as runs in the family very strongly   - Has had elevated lipids and glucose in the past, could be contributing, will recheck   - Tobacco use also contributing, offered assistance with cessation but declined, is using a lot of nicotine toothpicks   - Patient also admits to many drinks every night, advised on cutting back as likely is making all issues worse including making his SSRI not as  effective   - Mood      3 months ago increased to 75 mg but still struggling      Recommend increase to 100 mg   - BP     New diagnosis, pending labs above     Recommend starting BP medication Lisinopril 10 mg      Monitor BP daily, keep a log, send log weekly      Recheck 2 weeks   - Chronic thrombocytopenia - recheck labs today       PLAN   - Increase Sertraline to 100 mg   - Start Lisinopril 10 mg in AM   - Monitor BP and pulse, bring log to follow up      Send Sandor BP log in 1 week   - Recheck in 2 weeks     Review of the result(s) of each unique test - See list        2/23/21 - Lipid, BMP, CBC, TSH   Diagnosis or treatment significantly limited by social determinants of health - None     30 minutes spent on the date of the encounter doing chart review, history and exam, documentation and further activities as noted above    The patient indicates understanding of these issues and agrees with the plan.    Return in about 2 weeks (around 4/18/2022) for Recheck.    Xiomara Moralez PA-C  [facility] Keenan is a 32 year old who presenGeneral Visit    Depression:     Significant life event::  Job concerns and health concerns  Taking medications regularly::  3  Barriers to taking medications::  Remembering to take  History of Present Illness       Mental Health Follow-up:  Patient presents to follow-up on Anxiety.    Patient's anxiety since last visit has been:  Worse  The patient is not having other symptoms associated with anxiety.  Any significant life events: job concerns and health concerns  Patient is feeling anxious or having panic attacks.  Patient has concerns about alcohol or drug use.         Today's GENARO-7 Score: 7    Hypertension: He presents for follow up of hypertension.  He does not check blood pressure  regularly outside of the clinic. Outside blood pressures have been over 140/90. He does not follow a low salt diet.     He eats 0-1 servings of  fruits and vegetables daily.He consumes 1 sweetened beverage(s) daily.He exercises with enough effort to increase his heart rate 10 to 19 minutes per day.  He exercises with enough effort to increase his heart rate 4 days per week. He is missing 3 dose(s) of medications per week.  He is not taking prescribed medications regularly due to remembering to take.       - wants check of cholesterol, blood sugar, etc.   - Drinking 6 every night      Knows has a problem   - Been taking 75 mg Sertraline about 3 months ago   - Few nights didn't sleep at all   - Occasionally will take melatonin   - BP issues run in family     - Chewing and nicotine toothpicks      Almost all day long        - No chest pain      No shortness of breath      Denies KLEIN       PHQ 2/27/2019 3/26/2019 4/4/2022   PHQ-9 Total Score 2 2 5   Q9: Thoughts of better off dead/self-harm past 2 weeks Not at all Not at all Not at all     GENARO-7 SCORE 2/27/2019 3/26/2019 4/1/2022   Total Score - - -   Total Score 4 (minimal anxiety) 2 (minimal anxiety) 7 (mild anxiety)   Total Score 4 2 7         Review of Systems   Constitutional, HEENT, cardiovascular, pulmonary, gi and gu systems are negative, except as otherwise noted.      Objective    BP (!) 150/100   Pulse 78   Temp 97  F (36.1  C) (Temporal)   Resp 18   Wt 91.6 kg (202 lb)   SpO2 98%   BMI 29.83 kg/m    Body mass index is 29.83 kg/m .  Physical Exam   GENERAL APPEARANCE: healthy, alert and no distress  EYES: Eyes grossly normal to inspection, PERRLA, conjunctivae and sclerae without injection or discharge, EOM intact   RESP: Lungs clear to auscultation - no rales, rhonchi or wheezes    CV: Regular rates and rhythm, normal S1 S2, no S3 or S4, no murmur, click or rub, no peripheral edema and peripheral pulses strong and symmetric bilaterally   MS: No musculoskeletal defects are noted and gait is age appropriate without ataxia   SKIN: No suspicious lesions or rashes, hydration status appears adeuqate  with normal skin turgor   PSYCH: Alert and oriented x3; speech- coherent , normal rate and volume; able to articulate logical thoughts, able to abstract reason, no tangential thoughts, no hallucinations or delusions, mentation appears normal, Mood is euthymic. Affect is appropriate for this mood state and bright. Thought content is free of suicidal ideation, hallucinations, and delusions. Dress is adequate and upkept. Eye contact is good during conversation.       Diagnostics: Reviewed in Epic, see orders pending in Epic

## 2022-04-05 PROBLEM — I10 HYPERTENSION GOAL BP (BLOOD PRESSURE) < 130/80: Status: ACTIVE | Noted: 2022-04-05

## 2022-04-05 PROBLEM — R73.09 ELEVATED GLUCOSE: Status: ACTIVE | Noted: 2022-04-05

## 2022-04-05 PROBLEM — F10.10 ALCOHOL ABUSE, EPISODIC DRINKING BEHAVIOR: Status: ACTIVE | Noted: 2022-04-05

## 2022-04-05 PROBLEM — R03.0 ELEVATED BP WITHOUT DIAGNOSIS OF HYPERTENSION: Status: RESOLVED | Noted: 2022-04-04 | Resolved: 2022-04-05

## 2022-04-05 LAB — DEPRECATED CALCIDIOL+CALCIFEROL SERPL-MC: 22 UG/L (ref 20–75)

## 2022-04-05 NOTE — RESULT ENCOUNTER NOTE
Kirti Keenan    Vitamin D is on the low end of normal. This can affect mood. I recommend over the counter vitamin D (generic is fine) 2,000 IU per day. Cholesterol the highest it has been. The triglycerides are extremely elevated. This is due to your alcohol use. You need to cut back and ultimately stop drinking or this will worsen and potentially put you at risk for heart attack. Negative for diabetes. We will review and discuss this more at your upcoming visit.     The results are attached for your review.       Sandor Moralez PA-C

## 2022-04-18 ENCOUNTER — OFFICE VISIT (OUTPATIENT)
Dept: FAMILY MEDICINE | Facility: OTHER | Age: 32
End: 2022-04-18
Payer: COMMERCIAL

## 2022-04-18 VITALS
RESPIRATION RATE: 20 BRPM | WEIGHT: 193 LBS | HEART RATE: 84 BPM | OXYGEN SATURATION: 100 % | DIASTOLIC BLOOD PRESSURE: 88 MMHG | SYSTOLIC BLOOD PRESSURE: 132 MMHG | BODY MASS INDEX: 28.5 KG/M2 | TEMPERATURE: 97.8 F

## 2022-04-18 DIAGNOSIS — E78.1 HYPERTRIGLYCERIDEMIA: ICD-10-CM

## 2022-04-18 DIAGNOSIS — E78.5 HYPERLIPIDEMIA LDL GOAL <100: Primary | ICD-10-CM

## 2022-04-18 DIAGNOSIS — F10.10 ALCOHOL ABUSE, EPISODIC DRINKING BEHAVIOR: ICD-10-CM

## 2022-04-18 DIAGNOSIS — I10 HYPERTENSION GOAL BP (BLOOD PRESSURE) < 130/80: ICD-10-CM

## 2022-04-18 DIAGNOSIS — F41.1 GAD (GENERALIZED ANXIETY DISORDER): ICD-10-CM

## 2022-04-18 LAB
CHOLEST SERPL-MCNC: 163 MG/DL
FASTING STATUS PATIENT QL REPORTED: NO
HDLC SERPL-MCNC: 35 MG/DL
LDLC SERPL CALC-MCNC: 84 MG/DL
NONHDLC SERPL-MCNC: 128 MG/DL
TRIGL SERPL-MCNC: 221 MG/DL

## 2022-04-18 PROCEDURE — 99214 OFFICE O/P EST MOD 30 MIN: CPT | Performed by: PHYSICIAN ASSISTANT

## 2022-04-18 PROCEDURE — 36415 COLL VENOUS BLD VENIPUNCTURE: CPT | Performed by: PHYSICIAN ASSISTANT

## 2022-04-18 PROCEDURE — 80061 LIPID PANEL: CPT | Performed by: PHYSICIAN ASSISTANT

## 2022-04-18 RX ORDER — LISINOPRIL 10 MG/1
10 TABLET ORAL DAILY
Qty: 30 TABLET | Refills: 1 | Status: SHIPPED | OUTPATIENT
Start: 2022-04-18 | End: 2022-05-17

## 2022-04-18 ASSESSMENT — PATIENT HEALTH QUESTIONNAIRE - PHQ9
10. IF YOU CHECKED OFF ANY PROBLEMS, HOW DIFFICULT HAVE THESE PROBLEMS MADE IT FOR YOU TO DO YOUR WORK, TAKE CARE OF THINGS AT HOME, OR GET ALONG WITH OTHER PEOPLE: NOT DIFFICULT AT ALL
SUM OF ALL RESPONSES TO PHQ QUESTIONS 1-9: 1
SUM OF ALL RESPONSES TO PHQ QUESTIONS 1-9: 1

## 2022-04-18 ASSESSMENT — ANXIETY QUESTIONNAIRES
6. BECOMING EASILY ANNOYED OR IRRITABLE: SEVERAL DAYS
GAD7 TOTAL SCORE: 3
2. NOT BEING ABLE TO STOP OR CONTROL WORRYING: NOT AT ALL
GAD7 TOTAL SCORE: 3
1. FEELING NERVOUS, ANXIOUS, OR ON EDGE: SEVERAL DAYS
GAD7 TOTAL SCORE: 3
5. BEING SO RESTLESS THAT IT IS HARD TO SIT STILL: NOT AT ALL
3. WORRYING TOO MUCH ABOUT DIFFERENT THINGS: NOT AT ALL
4. TROUBLE RELAXING: NOT AT ALL
7. FEELING AFRAID AS IF SOMETHING AWFUL MIGHT HAPPEN: SEVERAL DAYS
7. FEELING AFRAID AS IF SOMETHING AWFUL MIGHT HAPPEN: SEVERAL DAYS

## 2022-04-18 ASSESSMENT — PAIN SCALES - GENERAL: PAINLEVEL: NO PAIN (0)

## 2022-04-18 NOTE — PROGRESS NOTES
"Assessment & Plan     ICD-10-CM    1. Hyperlipidemia LDL goal <100  E78.5 Lipid panel reflex to direct LDL Fasting     Lipid panel reflex to direct LDL Fasting   2. Hypertriglyceridemia  E78.1    3. Alcohol abuse, episodic drinking behavior  F10.10    4. GENARO (generalized anxiety disorder)  F41.1 sertraline (ZOLOFT) 50 MG tablet   5. Hypertension goal BP (blood pressure) < 130/80  I10 lisinopril (ZESTRIL) 10 MG tablet     - Patient here for short recheck after elevated BP and abnormal labs   - Patient reported was drinking 5-6 beers every night, now has not drank in 2 weeks   - Encouraged his efforts   - Discussed TGs, including etiology of elevation (likely ETOH) and risks of high TGs   - Will recheck today, but did advise with only 2 weeks might not be back to normal yet   - Await results, discussed if too high might need to consider Fenofibrate   - HTN      Log as below, marked improvement      BP better in clinic today as well      Continue on Lisinopril   - Mood      Only increased Sertraline to 100 mg for 4-5 days as \"felt it was too much\" so decreased back to 75 mg      Will continue at 75 mg and continue to monitor   - Reviewed all lab use and side effects, refilled as needed     Review of the result(s) of each unique test - See list        4/4/22- Lipid, Vitamin D, A1C, CMP, Tsh, CBC  Diagnosis or treatment significantly limited by social determinants of health - None     30 minutes spent on the date of the encounter doing chart review, history and exam, documentation and further activities as noted above    The patient indicates understanding of these issues and agrees with the plan.    Return in about 1 month (around 5/18/2022) for Recheck.    JUAN [facility] SIDDHARTH Keenan is a 32 year old who presents for the following health issues     History of Present Illness       Mental Health Follow-up:  Patient presents to follow-up on " Anxiety.    Patient's anxiety since last visit has been:  Good  The patient is not having other symptoms associated with anxiety.  Any significant life events: No  Patient is feeling anxious or having panic attacks.  Patient has no concerns about alcohol or drug use.       Today's PHQ-9         PHQ-9 Total Score: 1  PHQ-9 Q9 Thoughts of better off dead/self-harm past 2 weeks :   Not at all    How difficult have these problems made it for you to do your work, take care of things at home, or get along with other people: Not difficult at all    Today's GENARO-7 Score: 3    Hyperlipidemia:  He presents for follow up of hyperlipidemia.  He is not taking medication to lower cholesterol. He is not having myalgia or other side effects to statin medications.    Hypertension: He presents for follow up of hypertension.  He does check blood pressure  regularly outside of the clinic. Outside blood pressures have been over 140/90. He does not follow a low salt diet.     He eats 2-3 servings of fruits and vegetables daily.He consumes 1 sweetened beverage(s) daily.He exercises with enough effort to increase his heart rate 10 to 19 minutes per day.  He exercises with enough effort to increase his heart rate 4 days per week.   He is taking medications regularly.       BP   - 124/80, 122/76, 117/74, 127/64    - Started work back up today  - Hasn't drank in 2 weeks   - Lost 10 lbs   - Dropped Sertraline back down to 75 mg,did 100 only for 4-5 days, felt like too much   - Dry cough      But not sure if this is medication or because they have had colds in the house             Plan from last visit 4/4/22   PLAN   - Increase Sertraline to 100 mg   - Start Lisinopril 10 mg in AM   - Monitor BP and pulse, bring log to follow up      Send Sandor BP log in 1 week   - Recheck in 2 weeks       Review of Systems   Constitutional, HEENT, cardiovascular, pulmonary, gi and gu systems are negative, except as otherwise noted.      Objective    /88    Pulse 84   Temp 97.8  F (36.6  C) (Temporal)   Resp 20   Wt 87.5 kg (193 lb)   SpO2 100%   BMI 28.50 kg/m    Body mass index is 28.5 kg/m .  Physical Exam   GENERAL APPEARANCE: healthy, alert and no distress  EYES: Eyes grossly normal to inspection, PERRLA, conjunctivae and sclerae without injection or discharge, EOM intact   RESP: Lungs clear to auscultation - no rales, rhonchi or wheezes    CV: Regular rates and rhythm, normal S1 S2, no S3 or S4, no murmur, click or rub, no peripheral edema and peripheral pulses strong and symmetric bilaterally   MS: No musculoskeletal defects are noted and gait is age appropriate without ataxia   SKIN: No suspicious lesions or rashes, hydration status appears adeuqate with normal skin turgor   PSYCH: Alert and oriented x3; speech- coherent , normal rate and volume; able to articulate logical thoughts, able to abstract reason, no tangential thoughts, no hallucinations or delusions, mentation appears normal, Mood is euthymic. Affect is appropriate for this mood state and bright. Thought content is free of suicidal ideation, hallucinations, and delusions. Dress is adequate and upkept. Eye contact is good during conversation.       Diagnostics: reviewed in Epic, see orders pending in Epic

## 2022-04-19 PROBLEM — E78.1 HYPERTRIGLYCERIDEMIA: Status: ACTIVE | Noted: 2022-04-19

## 2022-04-19 ASSESSMENT — ANXIETY QUESTIONNAIRES: GAD7 TOTAL SCORE: 3

## 2022-04-19 NOTE — RESULT ENCOUNTER NOTE
Kirti Keenan    Much improvement of all cholesterol numbers with your drinking cessation and working on healthier diet. Keep up the great work.     The results are attached for your review.       Sandor Moralez PA-C

## 2022-05-17 ENCOUNTER — OFFICE VISIT (OUTPATIENT)
Dept: FAMILY MEDICINE | Facility: OTHER | Age: 32
End: 2022-05-17
Payer: COMMERCIAL

## 2022-05-17 VITALS
RESPIRATION RATE: 18 BRPM | SYSTOLIC BLOOD PRESSURE: 120 MMHG | DIASTOLIC BLOOD PRESSURE: 88 MMHG | OXYGEN SATURATION: 97 % | BODY MASS INDEX: 27.91 KG/M2 | WEIGHT: 189 LBS | HEART RATE: 84 BPM | TEMPERATURE: 97.7 F

## 2022-05-17 DIAGNOSIS — F10.10 ALCOHOL ABUSE, EPISODIC DRINKING BEHAVIOR: ICD-10-CM

## 2022-05-17 DIAGNOSIS — E78.5 HYPERLIPIDEMIA LDL GOAL <100: ICD-10-CM

## 2022-05-17 DIAGNOSIS — E78.1 HYPERTRIGLYCERIDEMIA: ICD-10-CM

## 2022-05-17 DIAGNOSIS — I10 HYPERTENSION GOAL BP (BLOOD PRESSURE) < 130/80: Primary | ICD-10-CM

## 2022-05-17 DIAGNOSIS — F41.1 GAD (GENERALIZED ANXIETY DISORDER): ICD-10-CM

## 2022-05-17 DIAGNOSIS — R73.09 ELEVATED GLUCOSE: ICD-10-CM

## 2022-05-17 PROCEDURE — 99214 OFFICE O/P EST MOD 30 MIN: CPT | Performed by: PHYSICIAN ASSISTANT

## 2022-05-17 RX ORDER — LISINOPRIL 10 MG/1
10 TABLET ORAL DAILY
Qty: 90 TABLET | Refills: 3 | Status: SHIPPED | OUTPATIENT
Start: 2022-05-17 | End: 2022-11-15 | Stop reason: SINTOL

## 2022-05-17 ASSESSMENT — PAIN SCALES - GENERAL: PAINLEVEL: NO PAIN (0)

## 2022-05-17 ASSESSMENT — ANXIETY QUESTIONNAIRES
7. FEELING AFRAID AS IF SOMETHING AWFUL MIGHT HAPPEN: NOT AT ALL
2. NOT BEING ABLE TO STOP OR CONTROL WORRYING: NOT AT ALL
GAD7 TOTAL SCORE: 0
7. FEELING AFRAID AS IF SOMETHING AWFUL MIGHT HAPPEN: NOT AT ALL
5. BEING SO RESTLESS THAT IT IS HARD TO SIT STILL: NOT AT ALL
8. IF YOU CHECKED OFF ANY PROBLEMS, HOW DIFFICULT HAVE THESE MADE IT FOR YOU TO DO YOUR WORK, TAKE CARE OF THINGS AT HOME, OR GET ALONG WITH OTHER PEOPLE?: NOT DIFFICULT AT ALL
1. FEELING NERVOUS, ANXIOUS, OR ON EDGE: NOT AT ALL
GAD7 TOTAL SCORE: 0
3. WORRYING TOO MUCH ABOUT DIFFERENT THINGS: NOT AT ALL
GAD7 TOTAL SCORE: 0
6. BECOMING EASILY ANNOYED OR IRRITABLE: NOT AT ALL
4. TROUBLE RELAXING: NOT AT ALL

## 2022-05-17 ASSESSMENT — PATIENT HEALTH QUESTIONNAIRE - PHQ9
SUM OF ALL RESPONSES TO PHQ QUESTIONS 1-9: 0
SUM OF ALL RESPONSES TO PHQ QUESTIONS 1-9: 0
10. IF YOU CHECKED OFF ANY PROBLEMS, HOW DIFFICULT HAVE THESE PROBLEMS MADE IT FOR YOU TO DO YOUR WORK, TAKE CARE OF THINGS AT HOME, OR GET ALONG WITH OTHER PEOPLE: NOT DIFFICULT AT ALL

## 2022-05-17 NOTE — PROGRESS NOTES
Assessment & Plan     ICD-10-CM    1. Hypertension goal BP (blood pressure) < 130/80  I10 lisinopril (ZESTRIL) 10 MG tablet   2. Hypertriglyceridemia  E78.1    3. Hyperlipidemia LDL goal <100  E78.5    4. Elevated glucose  R73.09    5. Alcohol abuse, episodic drinking behavior  F10.10    6. GENARO (generalized anxiety disorder)  F41.1      - Patient here for recheck after abnormal labs and elevated BP   - He reports quit drinking for almost 30 days, now only drinks on weekends, not every day      Encouraged his recent sobriety     Encouraged recent dedication to health and making healthier food choices   - Reviewed recent labs, due to stopping drinking his TGs dramatically increased from 523 to 22      Liver function has been normal   - BP now normal range with lisinopril  - Feeling mood is really good on current medication (Sertraline 75 mg)   - Reviewed medications use and side effects, refilled as needed   - Plan recheck in 6 months with fasting labs     Review of the result(s) of each unique test - See list         4/18/22 - Lipid        4/4/22 - Lipid, A1C, CMP, TSH, CBC   Diagnosis or treatment significantly limited by social determinants of health - None     20 minutes spent on the date of the encounter doing chart review, history and exam, documentation and further activities as noted above    Return in about 6 months (around 11/17/2022).    Xiomara Moralez PA-C  [facility] JASSI Keenan is a 32 year old who presents for the following health issues.    History of Present Illness       Mental Health Follow-up:  Patient presents to follow-up on Anxiety.    Patient's anxiety since last visit has been:  Better  The patient is not having other symptoms associated with anxiety.  Any significant life events: No  Patient is not feeling anxious or having panic attacks.  Patient has no concerns about alcohol or drug use.       Today's PHQ-9         PHQ-9 Total Score:  0  PHQ-9 Q9 Thoughts of better off dead/self-harm past 2 weeks :   (P) Not at all    How difficult have these problems made it for you to do your work, take care of things at home, or get along with other people: Not difficult at all    Today's GENARO-7 Score: 0    Reason for visit:  Follow up meds and blood pressure    He eats 2-3 servings of fruits and vegetables daily.He consumes 0 sweetened beverage(s) daily.He exercises with enough effort to increase his heart rate 20 to 29 minutes per day.  He exercises with enough effort to increase his heart rate 3 or less days per week. He is missing 1 dose(s) of medications per week.  He is not taking prescribed medications regularly due to remembering to take.           Review of Systems   Constitutional, HEENT, cardiovascular, pulmonary, gi and gu systems are negative, except as otherwise noted.      Objective    /88   Pulse 84   Temp 97.7  F (36.5  C) (Temporal)   Resp 18   Wt 85.7 kg (189 lb)   SpO2 97%   BMI 27.91 kg/m    Body mass index is 27.91 kg/m .  Physical Exam   GENERAL APPEARANCE: healthy, alert and no distress  EYES: Eyes grossly normal to inspection, PERRLA, conjunctivae and sclerae without injection or discharge, EOM intact   RESP: Lungs clear to auscultation - no rales, rhonchi or wheezes    CV: Regular rates and rhythm, normal S1 S2, no S3 or S4, no murmur, click or rub, no peripheral edema and peripheral pulses strong and symmetric bilaterally   MS: No musculoskeletal defects are noted and gait is age appropriate without ataxia   SKIN: No suspicious lesions or rashes, hydration status appears adeuqate with normal skin turgor   PSYCH: Alert and oriented x3; speech- coherent , normal rate and volume; able to articulate logical thoughts, able to abstract reason, no tangential thoughts, no hallucinations or delusions, mentation appears normal, Mood is euthymic. Affect is appropriate for this mood state and bright. Thought content is free of  suicidal ideation, hallucinations, and delusions. Dress is adequate and upkept. Eye contact is good during conversation.       Diagnostics: reviewed in Epic

## 2022-05-18 ASSESSMENT — ANXIETY QUESTIONNAIRES: GAD7 TOTAL SCORE: 0

## 2022-05-18 ASSESSMENT — PATIENT HEALTH QUESTIONNAIRE - PHQ9: SUM OF ALL RESPONSES TO PHQ QUESTIONS 1-9: 0

## 2022-11-15 ENCOUNTER — OFFICE VISIT (OUTPATIENT)
Dept: FAMILY MEDICINE | Facility: OTHER | Age: 32
End: 2022-11-15
Payer: COMMERCIAL

## 2022-11-15 VITALS
TEMPERATURE: 99.9 F | OXYGEN SATURATION: 97 % | RESPIRATION RATE: 16 BRPM | DIASTOLIC BLOOD PRESSURE: 82 MMHG | HEART RATE: 95 BPM | WEIGHT: 188.5 LBS | BODY MASS INDEX: 27.92 KG/M2 | HEIGHT: 69 IN | SYSTOLIC BLOOD PRESSURE: 138 MMHG

## 2022-11-15 DIAGNOSIS — E78.1 HYPERTRIGLYCERIDEMIA: ICD-10-CM

## 2022-11-15 DIAGNOSIS — E78.5 HYPERLIPIDEMIA LDL GOAL <100: ICD-10-CM

## 2022-11-15 DIAGNOSIS — R05.1 ACUTE COUGH: ICD-10-CM

## 2022-11-15 DIAGNOSIS — I10 HYPERTENSION GOAL BP (BLOOD PRESSURE) < 130/80: Primary | ICD-10-CM

## 2022-11-15 DIAGNOSIS — F41.1 GAD (GENERALIZED ANXIETY DISORDER): ICD-10-CM

## 2022-11-15 DIAGNOSIS — R73.09 ELEVATED GLUCOSE: ICD-10-CM

## 2022-11-15 LAB
ALBUMIN SERPL-MCNC: 4.3 G/DL (ref 3.4–5)
ALP SERPL-CCNC: 63 U/L (ref 40–150)
ALT SERPL W P-5'-P-CCNC: 34 U/L (ref 0–70)
ANION GAP SERPL CALCULATED.3IONS-SCNC: 4 MMOL/L (ref 3–14)
AST SERPL W P-5'-P-CCNC: 17 U/L (ref 0–45)
BILIRUB SERPL-MCNC: 0.6 MG/DL (ref 0.2–1.3)
BUN SERPL-MCNC: 14 MG/DL (ref 7–30)
CALCIUM SERPL-MCNC: 9 MG/DL (ref 8.5–10.1)
CHLORIDE BLD-SCNC: 103 MMOL/L (ref 94–109)
CHOLEST SERPL-MCNC: 237 MG/DL
CO2 SERPL-SCNC: 29 MMOL/L (ref 20–32)
CREAT SERPL-MCNC: 0.87 MG/DL (ref 0.66–1.25)
FASTING STATUS PATIENT QL REPORTED: YES
FLUAV AG SPEC QL IA: NEGATIVE
FLUBV AG SPEC QL IA: NEGATIVE
GFR SERPL CREATININE-BSD FRML MDRD: >90 ML/MIN/1.73M2
GLUCOSE BLD-MCNC: 112 MG/DL (ref 70–99)
HDLC SERPL-MCNC: 51 MG/DL
LDLC SERPL CALC-MCNC: 145 MG/DL
NONHDLC SERPL-MCNC: 186 MG/DL
POTASSIUM BLD-SCNC: 4.3 MMOL/L (ref 3.4–5.3)
PROT SERPL-MCNC: 7.3 G/DL (ref 6.8–8.8)
SARS-COV-2 RNA RESP QL NAA+PROBE: NEGATIVE
SODIUM SERPL-SCNC: 136 MMOL/L (ref 133–144)
TRIGL SERPL-MCNC: 207 MG/DL

## 2022-11-15 PROCEDURE — 36415 COLL VENOUS BLD VENIPUNCTURE: CPT | Performed by: PHYSICIAN ASSISTANT

## 2022-11-15 PROCEDURE — 87804 INFLUENZA ASSAY W/OPTIC: CPT | Performed by: PHYSICIAN ASSISTANT

## 2022-11-15 PROCEDURE — U0005 INFEC AGEN DETEC AMPLI PROBE: HCPCS | Performed by: PHYSICIAN ASSISTANT

## 2022-11-15 PROCEDURE — 80053 COMPREHEN METABOLIC PANEL: CPT | Performed by: PHYSICIAN ASSISTANT

## 2022-11-15 PROCEDURE — 80061 LIPID PANEL: CPT | Performed by: PHYSICIAN ASSISTANT

## 2022-11-15 PROCEDURE — 99214 OFFICE O/P EST MOD 30 MIN: CPT | Mod: CS | Performed by: PHYSICIAN ASSISTANT

## 2022-11-15 PROCEDURE — U0003 INFECTIOUS AGENT DETECTION BY NUCLEIC ACID (DNA OR RNA); SEVERE ACUTE RESPIRATORY SYNDROME CORONAVIRUS 2 (SARS-COV-2) (CORONAVIRUS DISEASE [COVID-19]), AMPLIFIED PROBE TECHNIQUE, MAKING USE OF HIGH THROUGHPUT TECHNOLOGIES AS DESCRIBED BY CMS-2020-01-R: HCPCS | Performed by: PHYSICIAN ASSISTANT

## 2022-11-15 RX ORDER — LOSARTAN POTASSIUM 50 MG/1
50 TABLET ORAL DAILY
Qty: 90 TABLET | Refills: 1 | Status: SHIPPED | OUTPATIENT
Start: 2022-11-15 | End: 2023-01-09

## 2022-11-15 ASSESSMENT — ANXIETY QUESTIONNAIRES
6. BECOMING EASILY ANNOYED OR IRRITABLE: NOT AT ALL
IF YOU CHECKED OFF ANY PROBLEMS ON THIS QUESTIONNAIRE, HOW DIFFICULT HAVE THESE PROBLEMS MADE IT FOR YOU TO DO YOUR WORK, TAKE CARE OF THINGS AT HOME, OR GET ALONG WITH OTHER PEOPLE: SOMEWHAT DIFFICULT
5. BEING SO RESTLESS THAT IT IS HARD TO SIT STILL: NOT AT ALL
3. WORRYING TOO MUCH ABOUT DIFFERENT THINGS: SEVERAL DAYS
GAD7 TOTAL SCORE: 4
1. FEELING NERVOUS, ANXIOUS, OR ON EDGE: NOT AT ALL
GAD7 TOTAL SCORE: 4
7. FEELING AFRAID AS IF SOMETHING AWFUL MIGHT HAPPEN: MORE THAN HALF THE DAYS
2. NOT BEING ABLE TO STOP OR CONTROL WORRYING: SEVERAL DAYS

## 2022-11-15 ASSESSMENT — PATIENT HEALTH QUESTIONNAIRE - PHQ9
SUM OF ALL RESPONSES TO PHQ QUESTIONS 1-9: 1
5. POOR APPETITE OR OVEREATING: NOT AT ALL

## 2022-11-15 ASSESSMENT — PAIN SCALES - GENERAL: PAINLEVEL: MILD PAIN (3)

## 2022-11-15 NOTE — PROGRESS NOTES
Assessment & Plan     ICD-10-CM    1. Hypertension goal BP (blood pressure) < 130/80  I10 losartan (COZAAR) 50 MG tablet      2. GENARO (generalized anxiety disorder)  F41.1 sertraline (ZOLOFT) 50 MG tablet      3. Hyperlipidemia LDL goal <100  E78.5 Lipid panel reflex to direct LDL Fasting     Lipid panel reflex to direct LDL Fasting     Comprehensive metabolic panel (BMP + Alb, Alk Phos, ALT, AST, Total. Bili, TP)      4. Hypertriglyceridemia  E78.1 Comprehensive metabolic panel (BMP + Alb, Alk Phos, ALT, AST, Total. Bili, TP)      5. Elevated glucose  R73.09 Comprehensive metabolic panel (BMP + Alb, Alk Phos, ALT, AST, Total. Bili, TP)      6. Acute cough  R05.1 Influenza A/B antigen     Symptomatic; Yes; 11/14/2022 COVID-19 Virus (Coronavirus) by PCR     Symptomatic; Yes; 11/14/2022 COVID-19 Virus (Coronavirus) by PCR Nose     Influenza A/B antigen        1. HTN   - Mildly elevated today, better at home, but reporting dry cough and would like to change   - Will have him stop lisinopril and start Losartan  - Reviewed use and side effects   - Discussed keeping BP log, goal BP <130/80 consistently   - Will need BMP updated today, await results     2. Anxiety  - Reports stable on Sertraline 50-75 mg, tried off and realized that he needs it   - Reviewed use and side effects, refilled     3 & 4.   - Not on medication, was improved with ETOH use cessation but reports back to that but not excessive like before, was doing well on diet until hunting season   - Will update today   - Results will be communicated to patient when available and appropriate medication changes made if necessary     5. Elevated glucose  - Continue to monitor, will get labs today     6. Cough   - Sudden onset yesterday, has children in school   - Not vaccinated   - Recommend swabs for influenza and COVID-19   - Await results  - Discussed symptomatic cares and warning signs       Review of the result(s) of each unique test - See list      4/8/22 -  lipid panel       4/4/22 - all labs   Diagnosis or treatment significantly limited by social determinants of health - none     25 minutes spent on the date of the encounter doing chart review, history and exam, documentation and further activities as noted above    The patient indicates understanding of these issues and agrees with the plan.    Return in about 3 months (around 2/15/2023) for BP Recheck.    Xiomara Hussein-JUAN [facility] SIDDHARTH Keenan is a 32 year old, presenting for the following health issues:  MOOD CHANGES and  Follow Up      History of Present Illness       Hypertension: He presents for follow up of hypertension.  He does check blood pressure  regularly outside of the clinic. Outside blood pressures have been over 140/90. He does not follow a low salt diet.       - 140/90 to 130/80   - Misses about 1 dose a week   - Tried to cut back on ETOH and eating healthier       Cough   - Started yesterday   - Body aches, fever, cough - dry   - Using Mucinex and dayquil   - Kids are coughing       Anxiety Follow-Up    How are you doing with your anxiety since your last visit? No change    Are you having other symptoms that might be associated with anxiety? No    Have you had a significant life event? No     Are you feeling depressed? No    Do you have any concerns with your use of alcohol or other drugs? No    - Late September missed for 1 month , did feel off so restarted       Social History     Tobacco Use     Smoking status: Never     Smokeless tobacco: Current     Types: Chew   Vaping Use     Vaping Use: Never used   Substance Use Topics     Alcohol use: Yes     Comment: twice a week; few drinks     Drug use: No     GENARO-7 SCORE 4/1/2022 4/18/2022 5/17/2022   Total Score - - -   Total Score 7 (mild anxiety) 3 (minimal anxiety) 0 (minimal anxiety)   Total Score 7 3 0     PHQ 4/4/2022 4/18/2022 5/17/2022   PHQ-9 Total Score 5 1 0   Q9: Thoughts of  "better off dead/self-harm past 2 weeks Not at all Not at all Not at all         Review of Systems   Constitutional, HEENT, cardiovascular, pulmonary, gi and gu systems are negative, except as otherwise noted.      Objective    /82   Pulse 95   Temp 99.9  F (37.7  C) (Temporal)   Resp 16   Ht 1.753 m (5' 9\")   Wt 85.5 kg (188 lb 8 oz)   SpO2 97%   BMI 27.84 kg/m    Body mass index is 27.84 kg/m .  Physical Exam   GENERAL APPEARANCE: moderately ill appearing, alert and no distress  EYES: Eyes grossly normal to inspection, PERRLA, conjunctivae and sclerae without injection or discharge, EOM intact   RESP: Lungs clear to auscultation - no rales, rhonchi or wheezes    CV: Regular rates and rhythm, normal S1 S2, no S3 or S4, no murmur, click or rub, no peripheral edema and peripheral pulses strong and symmetric bilaterally   MS: No musculoskeletal defects are noted and gait is age appropriate without ataxia   SKIN: No suspicious lesions or rashes, hydration status appears adeuqate with normal skin turgor   PSYCH: Alert and oriented x3; speech- coherent , normal rate and volume; able to articulate logical thoughts, able to abstract reason, no tangential thoughts, no hallucinations or delusions, mentation appears normal, Mood is euthymic. Affect is appropriate for this mood state and bright. Thought content is free of suicidal ideation, hallucinations, and delusions. Dress is adequate and upkept. Eye contact is good during conversation.       Reviewed in Epic: See orders pending in Epic, reviewed in Epic                  "

## 2022-11-15 NOTE — RESULT ENCOUNTER NOTE
Kirti Keenan    Your results were negative for influenza.     The results are attached for your review.       Sandor Moralez PA-C

## 2022-11-15 NOTE — PATIENT INSTRUCTIONS
- BP goal <130/80     - Keep a log, call or message Sandor if consistently over goal     - Stop Lisinopril      Start Losartan

## 2022-11-15 NOTE — RESULT ENCOUNTER NOTE
Hello Shlomo    Your cholesterol is a bit worse. Glucose also a little elevated. We will need to keep a close eye on that.     The results are attached for your review.       Sandor Moralez PA-C

## 2022-11-16 ENCOUNTER — MYC MEDICAL ADVICE (OUTPATIENT)
Dept: FAMILY MEDICINE | Facility: OTHER | Age: 32
End: 2022-11-16

## 2022-11-16 DIAGNOSIS — R05.1 ACUTE COUGH: Primary | ICD-10-CM

## 2022-11-16 NOTE — RESULT ENCOUNTER NOTE
Kirti Keenan    Your results were negative for COVID-19    The results are attached for your review.       Sandor Moralez PA-C

## 2022-11-16 NOTE — TELEPHONE ENCOUNTER
Good afternoon,     Tell me more about the symptoms you are having. Do you have any fevers? Any difficult breathing or wheezing? Are you coughing anything up? Any decrease in urination? Where is your headache located? What is your pain level 1-10, 10 being the worse? Any nausea or vomiting? Any dizziness or feeling light headed?   What have you tried at home for your symptoms?     I have included some home care below. Usually for cold symptoms we ask patient's try home care measures for the first 5-7 days. Let us know the answers to the questions above. I will route your message on to Sandor.       Cough Home Remedies:       Drink six to eight glasses of water daily.     Warm mist may help improve conditions. Breath through a warm wet washcloth placed over the mouth and nose or sit in a steam-filled bathroom for twenty to thirty minutes.     You could give 1/2 tsp mixed with 1/2 tsp lemon of honey or corn syrup.     Drink warm lemonade, apple cider, or tea to help soothe the cough.     Avoid irritants such as being around smoke, smog, or chemicals.     Turn down the heat, open the windows, or go into cooler air to help suppress cough.     Humidifier     If congested avoid milk products.     Take cough suppressant (ask pharmacists for product suggestions) if cough is interfering with activity, causing chest pain, vomiting, or interrupting night sleep.     Take OTC medication as needed, being sure to follow instructions on the label.   o For a wet cough, use a decongestant; for a dry cough, use an expectorant during the day and a suppressant at night; for an allergy, use an antihistamine or decongestant.     Self-Care for Headaches  Most headaches aren't serious and can be relieved with self-care. But some headaches may be a sign of another health problem like eye trouble or high blood pressure. To find the best treatment, learn what kind of headaches you get. For tension headaches, self-care will usually help. To  "treat migraines, ask your healthcare provider for advice. It's also possible to get both tension and migraine headaches. Self-care involves relieving the pain and avoiding headache \"triggers\" if you can.     Ways to reduce pain and tension  Try these steps:    Apply a cold compress or ice pack to the pain site.    Drink fluids. If nausea makes it hard to drink, try sucking on ice.    Rest. Protect yourself from bright light and loud noises.    Calm your emotions by imagining a peaceful scene.    Massage tight neck, shoulder, and head muscles.    To relax muscles, soak in a hot bath or use a hot shower.  Use medicines  Aspirin or other over-the-counter (OTC) pain medicines such as ibuprofen and acetaminophen can relieve headache. Remember: Never give aspirin to anyone 18 years old or younger because of the risk of getting Reye syndrome. Use pain medicines only when needed. Certain prescription and OTC medicines can lead to rebound headaches if they are taken too often. Check with your healthcare provider or pharmacist about your medicines.   Track your headaches  Keeping a headache diary can help you and your healthcare provider identify what's causing your headaches:     Note when each headache happens.    Identify your activities and the foods you've eaten 6 to 8 hours before the headache began.    Look for any trends or \"triggers.\"    Bring the information to your next medical appointment.  Signs of tension headache  Any of the following can be signs:     Dull pain or feeling of pressure in a tight band around your head    Pain in your neck or shoulders    Headache without a definite beginning or end    Headache after an activity such as driving or working on a computer  Signs of migraine  Any of the following can be signs:     Throbbing pain on one or both sides of your head    Nausea or vomiting    Extreme sensitivity to light, sound, and smells    Bright spots, flashes, or other visual changes    Pain or nausea " "so severe that you can't continue your daily activities  When to call your healthcare provider   Call your healthcare provider if any of these occur:     A headache that lingers after a recent injury or bump to the head    A headache that lasts for more than 3 days    Frequent headaches, especially in the morning  Get medical care right away if you have:    A headache along with slurred speech, changes in your vision, or numbness or weakness in your arms or legs    Headaches with seizures    A fever with a stiff neck or pain when you bend your head toward your chest    A headache that you would call \"the worst headache you have ever had\" or a severe, persistent headache that gets worse or doesn't get better with treatment      Take Care,         CRISTOBAL GarciaN, RN, PHN  [place]  November 16, 2022    "

## 2022-11-17 ENCOUNTER — TELEPHONE (OUTPATIENT)
Dept: FAMILY MEDICINE | Facility: OTHER | Age: 32
End: 2022-11-17

## 2022-11-17 RX ORDER — CODEINE PHOSPHATE AND GUAIFENESIN 10; 100 MG/5ML; MG/5ML
1-2 SOLUTION ORAL
Qty: 180 ML | Refills: 0 | Status: SHIPPED | OUTPATIENT
Start: 2022-11-17 | End: 2023-01-09

## 2022-11-17 RX ORDER — BENZONATATE 200 MG/1
200 CAPSULE ORAL 3 TIMES DAILY PRN
Qty: 30 CAPSULE | Refills: 0 | Status: SHIPPED | OUTPATIENT
Start: 2022-11-17 | End: 2023-01-09

## 2022-11-17 NOTE — TELEPHONE ENCOUNTER
benzonatate (TESSALON) 200 MG capsule    Prior Authorization Retail Medication Request    Medication/Dose:   ICD code (if different than what is on RX):    Previously Tried and Failed:    Rationale:      Insurance Name:    Insurance ID:        Pharmacy Information (if different than what is on RX)  Name:    Phone:

## 2022-11-21 ENCOUNTER — HEALTH MAINTENANCE LETTER (OUTPATIENT)
Age: 32
End: 2022-11-21

## 2022-11-21 NOTE — TELEPHONE ENCOUNTER
Prior Authorization Approval    Authorization Effective Date: 11/21/2022  Authorization Expiration Date: 11/21/2023  Medication: benzonatate (TESSALON) 200 MG capsule-APPROVED  Approved Dose/Quantity:    Reference #:     Insurance Company: [employer] - Phone [phone] Fax [phone]  Expected CoPay:       CoPay Card Available:      Foundation Assistance Needed:    Which Pharmacy is filling the prescription (Not needed for infusion/clinic administered): [id number] [place], [place] - [address] [facility] [address]  Pharmacy Notified: Yes  Patient Notified: No  **Instructed pharmacy to notify patient when script is ready to /ship.**

## 2022-11-21 NOTE — TELEPHONE ENCOUNTER
Central Prior Authorization Team   Phone: [phone]      PA Initiation    Medication: benzonatate (TESSALON) 200 MG capsule  Insurance Company: [employer] - Phone [phone] Fax [phone]  Pharmacy Filling the Rx: [facility] #[id number] - [place], [place] - [address] [facility] [address]  Filling Pharmacy Phone: [phone]  Filling Pharmacy Fax:    Start Date: 11/21/2022

## 2023-01-09 ENCOUNTER — OFFICE VISIT (OUTPATIENT)
Dept: FAMILY MEDICINE | Facility: CLINIC | Age: 33
End: 2023-01-09
Payer: COMMERCIAL

## 2023-01-09 VITALS
BODY MASS INDEX: 28.96 KG/M2 | HEIGHT: 69 IN | OXYGEN SATURATION: 97 % | DIASTOLIC BLOOD PRESSURE: 96 MMHG | WEIGHT: 195.5 LBS | SYSTOLIC BLOOD PRESSURE: 136 MMHG | TEMPERATURE: 99.2 F | HEART RATE: 68 BPM

## 2023-01-09 DIAGNOSIS — R73.09 ELEVATED GLUCOSE: ICD-10-CM

## 2023-01-09 DIAGNOSIS — E78.1 HYPERTRIGLYCERIDEMIA: ICD-10-CM

## 2023-01-09 DIAGNOSIS — I10 HYPERTENSION GOAL BP (BLOOD PRESSURE) < 130/80: ICD-10-CM

## 2023-01-09 DIAGNOSIS — R10.2 SUPRAPUBIC PAIN, ACUTE: Primary | ICD-10-CM

## 2023-01-09 DIAGNOSIS — E78.5 HYPERLIPIDEMIA LDL GOAL <100: ICD-10-CM

## 2023-01-09 DIAGNOSIS — D69.6 THROMBOCYTOPENIA (H): ICD-10-CM

## 2023-01-09 LAB
ALBUMIN SERPL-MCNC: 4.3 G/DL (ref 3.4–5)
ALBUMIN UR-MCNC: NEGATIVE MG/DL
ALP SERPL-CCNC: 60 U/L (ref 40–150)
ALT SERPL W P-5'-P-CCNC: 32 U/L (ref 0–70)
ANION GAP SERPL CALCULATED.3IONS-SCNC: 6 MMOL/L (ref 3–14)
APPEARANCE UR: CLEAR
AST SERPL W P-5'-P-CCNC: 13 U/L (ref 0–45)
BACTERIA #/AREA URNS HPF: ABNORMAL /HPF
BASOPHILS # BLD AUTO: 0 10E3/UL (ref 0–0.2)
BASOPHILS NFR BLD AUTO: 1 %
BILIRUB SERPL-MCNC: 0.4 MG/DL (ref 0.2–1.3)
BILIRUB UR QL STRIP: NEGATIVE
BUN SERPL-MCNC: 18 MG/DL (ref 7–30)
CALCIUM SERPL-MCNC: 9 MG/DL (ref 8.5–10.1)
CHLORIDE BLD-SCNC: 107 MMOL/L (ref 94–109)
CHOLEST SERPL-MCNC: 234 MG/DL
CO2 SERPL-SCNC: 27 MMOL/L (ref 20–32)
COLOR UR AUTO: YELLOW
CREAT SERPL-MCNC: 0.82 MG/DL (ref 0.66–1.25)
EOSINOPHIL # BLD AUTO: 0.2 10E3/UL (ref 0–0.7)
EOSINOPHIL NFR BLD AUTO: 3 %
ERYTHROCYTE [DISTWIDTH] IN BLOOD BY AUTOMATED COUNT: 12.9 % (ref 10–15)
FASTING STATUS PATIENT QL REPORTED: YES
GFR SERPL CREATININE-BSD FRML MDRD: >90 ML/MIN/1.73M2
GLUCOSE BLD-MCNC: 120 MG/DL (ref 70–99)
GLUCOSE UR STRIP-MCNC: NEGATIVE MG/DL
HCT VFR BLD AUTO: 39.5 % (ref 40–53)
HDLC SERPL-MCNC: 43 MG/DL
HGB BLD-MCNC: 13.9 G/DL (ref 13.3–17.7)
HGB UR QL STRIP: NEGATIVE
IMM GRANULOCYTES # BLD: 0 10E3/UL
IMM GRANULOCYTES NFR BLD: 1 %
KETONES UR STRIP-MCNC: NEGATIVE MG/DL
LDLC SERPL CALC-MCNC: 124 MG/DL
LEUKOCYTE ESTERASE UR QL STRIP: NEGATIVE
LYMPHOCYTES # BLD AUTO: 1.8 10E3/UL (ref 0.8–5.3)
LYMPHOCYTES NFR BLD AUTO: 33 %
MCH RBC QN AUTO: 29.9 PG (ref 26.5–33)
MCHC RBC AUTO-ENTMCNC: 35.2 G/DL (ref 31.5–36.5)
MCV RBC AUTO: 85 FL (ref 78–100)
MONOCYTES # BLD AUTO: 0.4 10E3/UL (ref 0–1.3)
MONOCYTES NFR BLD AUTO: 7 %
NEUTROPHILS # BLD AUTO: 3.1 10E3/UL (ref 1.6–8.3)
NEUTROPHILS NFR BLD AUTO: 56 %
NITRATE UR QL: NEGATIVE
NONHDLC SERPL-MCNC: 191 MG/DL
PH UR STRIP: 6 [PH] (ref 5–7)
PLATELET # BLD AUTO: 117 10E3/UL (ref 150–450)
POTASSIUM BLD-SCNC: 4.2 MMOL/L (ref 3.4–5.3)
PROT SERPL-MCNC: 7.1 G/DL (ref 6.8–8.8)
PSA SERPL-MCNC: 0.32 UG/L (ref 0–4)
RBC # BLD AUTO: 4.65 10E6/UL (ref 4.4–5.9)
RBC #/AREA URNS AUTO: ABNORMAL /HPF
SODIUM SERPL-SCNC: 140 MMOL/L (ref 133–144)
SP GR UR STRIP: 1.02 (ref 1–1.03)
SQUAMOUS #/AREA URNS AUTO: ABNORMAL /LPF
TRIGL SERPL-MCNC: 336 MG/DL
UROBILINOGEN UR STRIP-ACNC: 0.2 E.U./DL
WBC # BLD AUTO: 5.6 10E3/UL (ref 4–11)
WBC #/AREA URNS AUTO: ABNORMAL /HPF

## 2023-01-09 PROCEDURE — 80061 LIPID PANEL: CPT | Performed by: FAMILY MEDICINE

## 2023-01-09 PROCEDURE — G0103 PSA SCREENING: HCPCS | Performed by: FAMILY MEDICINE

## 2023-01-09 PROCEDURE — 81001 URINALYSIS AUTO W/SCOPE: CPT | Performed by: FAMILY MEDICINE

## 2023-01-09 PROCEDURE — 80053 COMPREHEN METABOLIC PANEL: CPT | Performed by: FAMILY MEDICINE

## 2023-01-09 PROCEDURE — 36415 COLL VENOUS BLD VENIPUNCTURE: CPT | Performed by: FAMILY MEDICINE

## 2023-01-09 PROCEDURE — 85025 COMPLETE CBC W/AUTO DIFF WBC: CPT | Performed by: FAMILY MEDICINE

## 2023-01-09 PROCEDURE — 99214 OFFICE O/P EST MOD 30 MIN: CPT | Performed by: FAMILY MEDICINE

## 2023-01-09 PROCEDURE — 87086 URINE CULTURE/COLONY COUNT: CPT | Performed by: FAMILY MEDICINE

## 2023-01-09 RX ORDER — SULFAMETHOXAZOLE/TRIMETHOPRIM 800-160 MG
1 TABLET ORAL 2 TIMES DAILY
Qty: 20 TABLET | Refills: 0 | Status: SHIPPED | OUTPATIENT
Start: 2023-01-09 | End: 2023-01-19

## 2023-01-09 RX ORDER — LISINOPRIL 10 MG/1
10 TABLET ORAL DAILY
Qty: 30 TABLET | Refills: 1 | COMMUNITY
Start: 2023-01-09 | End: 2023-04-20

## 2023-01-09 ASSESSMENT — PAIN SCALES - GENERAL: PAINLEVEL: NO PAIN (0)

## 2023-01-09 NOTE — RESULT ENCOUNTER NOTE
Please inform of results if patient has not viewed in [employer] within 3 business days.    CBC Results - Your cell counts were normal for you, still mildly low platelets.    Your urine test was essentially normal. There were some skin cells (squamous epithelial) present which are contaminants, likely indicating the bacteria are contaminants as well.     Your other labs are pending.    Please call the clinic with any questions you may have.     Have a great day,    Dr. Bran

## 2023-01-09 NOTE — RESULT ENCOUNTER NOTE
Please inform of results if patient has not viewed in [employer] within 3 business days.    Your prostate test was normal.    CMP Results - Your blood sugar was 120. Your kidney function, electrolytes, and liver function were normal.    Continue with the plan of care we discussed.    Please call the clinic with any questions you may have.     Have a great day,    Dr. Bran

## 2023-01-09 NOTE — PROGRESS NOTES
Assessment & Plan   1. Suprapubic pain, acute: Unclear cause requiring further work-up.  Previous UA negative.  Recent antibiotic use.  Will repeat UA and urine culture but unlikely to show anything.  Check PSA for any elevation concerning for prostatitis or other intra prostatic processes.  Also check CT abdomen and pelvis looking for evidence of stone, diverticulitis, etc.  Discussed red flag symptoms when to go to the ER including worsening pain, fever etc.  Will place on Bactrim as this would help cover prostatitis, and UTI.  If no findings on labs and symptoms persist would refer to urology.  Patient agreeable with this plan.  - PSA, screen; Future  - sulfamethoxazole-trimethoprim (BACTRIM DS) 800-160 MG tablet; Take 1 tablet by mouth 2 times daily for 10 days  Dispense: 20 tablet; Refill: 0  - CBC with Platelets & Differential; Future  - UA with Microscopic; Future  - Urine Culture; Future  - Comprehensive metabolic panel; Future  - CT Abdomen Pelvis w/o & w Contrast; Future    2. Thrombocytopenia (H)  - CBC with Platelets & Differential; Future    3. Hypertension goal BP (blood pressure) < 130/80: Medication list updated.  Not controlled on initial check today, but has been holding medication due to onset of symptoms after switching from losartan to lisinopril follow-up with PCP.  - lisinopril (ZESTRIL) 10 MG tablet; Take 1 tablet (10 mg) by mouth daily  Dispense: 30 tablet; Refill: 1        Grayson Valdez MD  [facility]    This chart is completed utilizing dictation software; typos and/or incorrect word substitutions may unintentionally occur.       Mariela Keenan is a 32 year old, presenting for the following health issues:  Abdominal Pain    History of Present Illness       Reason for visit:  Abdominal bladder irregularities  Symptom onset:  3-4 weeks ago  Symptom intensity:  Mild  Symptom progression:  Staying the same  Had these symptoms before:  Yes  Has  tried/received treatment for these symptoms:  No  What makes it worse:  Sitting  What makes it better:  Being active    He eats 2-3 servings of fruits and vegetables daily.He consumes 1 sweetened beverage(s) daily.He exercises with enough effort to increase his heart rate 20 to 29 minutes per day.  He exercises with enough effort to increase his heart rate 3 or less days per week. He is missing 1 dose(s) of medications per week.     Pain History:  When did you first notice your pain? - Acute Pain   Have you seen anyone else for your pain? Yes - went to urgent care on the 12/31/2022  Where in your body do you have pain? Abdominal/Flank Pain  Onset/Duration: December 18th   Description:   Character: Dull ache  Location: ruddy-umbilical region  Radiation: Pelvic region  Intensity: mild  Progression of Symptoms:  waxing and waning  Accompanying Signs & Symptoms:  Fever/Chills: No  Gas/Bloating: YES  Nausea: No  Vomitting: No  Diarrhea: No  Constipation: No  Dysuria or Hematuria: No, slightly more frequent urination   History:   Trauma: No  Previous similar pain: YES, 2018 but went away faster   Previous tests done: unsure   Precipitating factors:   Does the pain change with:     Food: No    Bowel Movement: No    Urination: YES, with an empty bladder the discomfort is more noticeable    Other factors:  worsened by spotting   Therapies tried and outcome: cipro does not help, pt completed dose     Patient states he has been having suprapubic discomfort/pain for approximately 3 weeks.  Came on rather acutely well at a [place] wild game in 2018.  Associated with urinary frequency.  Denies any fever, chills, dysuria, hematuria, hematochezia, diarrhea, nausea, fever.  History of prostatitis.  Brother has history of kidney stones.  Another family member does have history of diverticulitis.    No family or personal history of Crohn's disease, ulcerative colitis.    No new sexual partners.  .    Possibly located more on  "the leftward side, but \"definitely just above the pubic bone\".    Was seen in urgent care on 12/31/2022.  Placed on Cipro.  Has not seen any significant relief.    No new supplements.    Was recently switched from losartan to lisinopril 1 week prior to symptoms.    No other questions or concerns today.    Review of Systems   Constitutional, HEENT, cardiovascular, pulmonary, GI, , musculoskeletal, neuro, skin, endocrine and psych systems are negative, except as otherwise noted.      Objective    BP (!) 134/91 (BP Location: Left arm, Patient Position: Chair, Cuff Size: Adult Regular)   Pulse 68   Temp 99.2  F (37.3  C) (Temporal)   Ht 1.765 m (5' 9.49\")   Wt 88.7 kg (195 lb 8 oz)   SpO2 97%   BMI 28.47 kg/m    Body mass index is 28.47 kg/m .  Physical Exam   General: Appears well and in no acute distress.  Cardiovascular: Regular rate and rhythm, normal S1 and S2 without murmur. No extra heartsounds or friction rub. Radial pulses present and equal bilaterally.  Respiratory: Lungs clear to auscultation bilaterally. No wheezing or crackles. No prolonged expiration. Symmetrical chest rise.  Musculoskeletal: No CVA tenderness.  No gross extremity deformities. No peripheral edema. Normal muscle bulk.  GI/Rectal: Subjective discomfort with palpation of the suprapubic area.  Otherwise soft, non-tender abdomen. No hepatosplenomegaly. Normal active bowel sounds.  -Male: Penis without lesions. No urethral discharge. No palpable testicular nodules. No hernia    Labs: Pending            "

## 2023-01-11 ENCOUNTER — HOSPITAL ENCOUNTER (OUTPATIENT)
Dept: CT IMAGING | Facility: CLINIC | Age: 33
Discharge: HOME | End: 2023-01-11
Attending: FAMILY MEDICINE | Admitting: FAMILY MEDICINE
Payer: COMMERCIAL

## 2023-01-11 DIAGNOSIS — R10.2 SUPRAPUBIC PAIN, ACUTE: ICD-10-CM

## 2023-01-11 LAB — BACTERIA UR CULT: NO GROWTH

## 2023-01-11 PROCEDURE — 74177 CT ABD & PELVIS W/CONTRAST: CPT

## 2023-01-11 PROCEDURE — 250N000009 HC RX 250: Performed by: FAMILY MEDICINE

## 2023-01-11 PROCEDURE — 250N000011 HC RX IP 250 OP 636: Performed by: FAMILY MEDICINE

## 2023-01-11 RX ORDER — IOPAMIDOL 755 MG/ML
500 INJECTION, SOLUTION INTRAVASCULAR ONCE
Status: COMPLETED | OUTPATIENT
Start: 2023-01-11 | End: 2023-01-11

## 2023-01-11 RX ADMIN — IOPAMIDOL 95 ML: 755 INJECTION, SOLUTION INTRAVENOUS at 09:17

## 2023-01-11 RX ADMIN — SODIUM CHLORIDE 60 ML: 9 INJECTION, SOLUTION INTRAVENOUS at 09:17

## 2023-01-11 NOTE — RESULT ENCOUNTER NOTE
Please inform of results if patient has not viewed in CSS[facility] within 3 business days.    Your urine culture did not grow any bacteria which is not uncommon.     Continue with the plan of care we discussed.    Please call the clinic with any questions you may have.     Have a great day,    Dr. Bran

## 2023-04-16 ENCOUNTER — HEALTH MAINTENANCE LETTER (OUTPATIENT)
Age: 33
End: 2023-04-16

## 2023-04-19 ENCOUNTER — MYC MEDICAL ADVICE (OUTPATIENT)
Dept: FAMILY MEDICINE | Facility: OTHER | Age: 33
End: 2023-04-19
Payer: COMMERCIAL

## 2023-04-19 DIAGNOSIS — I10 HYPERTENSION GOAL BP (BLOOD PRESSURE) < 130/80: Primary | ICD-10-CM

## 2023-04-20 RX ORDER — LISINOPRIL 10 MG/1
10 TABLET ORAL DAILY
Qty: 30 TABLET | Refills: 0 | Status: SHIPPED | OUTPATIENT
Start: 2023-04-20 | End: 2023-04-26 | Stop reason: SINTOL

## 2023-04-20 NOTE — TELEPHONE ENCOUNTER
Call to patient;  Aware of provider instructions; appts made.  MA schedule on hold so scheduled on RN schedule.  Natalya BARBOZA RN

## 2023-04-20 NOTE — TELEPHONE ENCOUNTER
"lisinopril (ZESTRIL) 10 MG tablet 30 tablet 1 1/9/2023  No   Sig - Route: Take 1 tablet (10 mg) by mouth daily - Oral   Class: Historical   Order: [id number]       Historical medication. Patient states that they are low. Looking for possible increase. Seen by PCP on 11/1522 for hypertension. Patient was switched to Losartan. Back on lisinopril as of 1/9/23 visit with another provider. It was noted: \"3. Hypertension goal BP (blood pressure) < 130/80: Medication list updated.  Not controlled on initial check today, but has been holding medication due to onset of symptoms after switching from losartan to lisinopril follow-up with PCP.  - lisinopril (ZESTRIL) 10 MG tablet; Take 1 tablet (10 mg) by mouth daily  Dispense: 30 tablet; Refill: 1\"    Routing to PCP.    Joana Wolf, CRISTOBALN, RN, PHN  Registered Nurse-Clinic Triage  [facility]  4/20/2023 at 8:54 AM    "

## 2023-04-20 NOTE — TELEPHONE ENCOUNTER
Patient needs a follow up. When switching medications we need a blood pressure and recheck of labs. I also need actual numbers he is getting at home.     I will send a carlos refill, but please schedule for BP recheck and fasting lab recheck early next week. Can then follow up with me on Wednesday virtually to discuss increase.     Sandor Moralez PA-C  [facility]

## 2023-04-21 ENCOUNTER — ALLIED HEALTH/NURSE VISIT (OUTPATIENT)
Dept: FAMILY MEDICINE | Facility: OTHER | Age: 33
End: 2023-04-21
Payer: COMMERCIAL

## 2023-04-21 ENCOUNTER — LAB (OUTPATIENT)
Dept: LAB | Facility: OTHER | Age: 33
End: 2023-04-21
Payer: COMMERCIAL

## 2023-04-21 DIAGNOSIS — Z01.30 BP CHECK: Primary | ICD-10-CM

## 2023-04-21 DIAGNOSIS — I10 HYPERTENSION GOAL BP (BLOOD PRESSURE) < 130/80: ICD-10-CM

## 2023-04-21 LAB
ANION GAP SERPL CALCULATED.3IONS-SCNC: 13 MMOL/L (ref 7–15)
BUN SERPL-MCNC: 13.9 MG/DL (ref 6–20)
CALCIUM SERPL-MCNC: 9.1 MG/DL (ref 8.6–10)
CHLORIDE SERPL-SCNC: 103 MMOL/L (ref 98–107)
CREAT SERPL-MCNC: 0.94 MG/DL (ref 0.67–1.17)
DEPRECATED HCO3 PLAS-SCNC: 27 MMOL/L (ref 22–29)
GFR SERPL CREATININE-BSD FRML MDRD: >90 ML/MIN/1.73M2
GLUCOSE SERPL-MCNC: 104 MG/DL (ref 70–99)
POTASSIUM SERPL-SCNC: 4.3 MMOL/L (ref 3.4–5.3)
SODIUM SERPL-SCNC: 143 MMOL/L (ref 136–145)

## 2023-04-21 PROCEDURE — 36415 COLL VENOUS BLD VENIPUNCTURE: CPT

## 2023-04-21 PROCEDURE — 80048 BASIC METABOLIC PNL TOTAL CA: CPT

## 2023-04-21 PROCEDURE — 99207 PR NO CHARGE NURSE ONLY: CPT

## 2023-04-21 NOTE — PROGRESS NOTES
Shlomo Nelson is a 33 year old year old patient who comes in today for a Blood Pressure check because of ongoing blood pressure monitoring.  Vital Signs by RN   Patient is taking medication as prescribed  Patient is tolerating medications well.  Patient is not monitoring Blood Pressure at home.    Current complaints: none  Disposition:  patient to continue with the same medication    CRISTOBAL OlguinN, RN, PHN  [facility] ~ Registered Nurse  Clinic Triage ~ [place] [facility] [place]  April 21, 2023

## 2023-04-21 NOTE — RESULT ENCOUNTER NOTE
Kirti Keenan    Your results were improved. Glucose not as high as before.     The results are attached for your review.       Sandor Moralez PA-C

## 2023-04-26 ENCOUNTER — VIRTUAL VISIT (OUTPATIENT)
Dept: FAMILY MEDICINE | Facility: OTHER | Age: 33
End: 2023-04-26
Payer: COMMERCIAL

## 2023-04-26 DIAGNOSIS — F41.1 GAD (GENERALIZED ANXIETY DISORDER): ICD-10-CM

## 2023-04-26 DIAGNOSIS — I10 HYPERTENSION GOAL BP (BLOOD PRESSURE) < 130/80: ICD-10-CM

## 2023-04-26 PROCEDURE — 99214 OFFICE O/P EST MOD 30 MIN: CPT | Mod: 95 | Performed by: PHYSICIAN ASSISTANT

## 2023-04-26 RX ORDER — LISINOPRIL 10 MG/1
10 TABLET ORAL DAILY
Qty: 30 TABLET | Refills: 0 | Status: CANCELLED | OUTPATIENT
Start: 2023-04-26

## 2023-04-26 RX ORDER — LOSARTAN POTASSIUM 50 MG/1
50 TABLET ORAL DAILY
Qty: 90 TABLET | Refills: 1 | Status: SHIPPED | OUTPATIENT
Start: 2023-04-26 | End: 2023-06-27

## 2023-04-26 ASSESSMENT — ANXIETY QUESTIONNAIRES
7. FEELING AFRAID AS IF SOMETHING AWFUL MIGHT HAPPEN: SEVERAL DAYS
6. BECOMING EASILY ANNOYED OR IRRITABLE: NOT AT ALL
GAD7 TOTAL SCORE: 1
7. FEELING AFRAID AS IF SOMETHING AWFUL MIGHT HAPPEN: SEVERAL DAYS
IF YOU CHECKED OFF ANY PROBLEMS ON THIS QUESTIONNAIRE, HOW DIFFICULT HAVE THESE PROBLEMS MADE IT FOR YOU TO DO YOUR WORK, TAKE CARE OF THINGS AT HOME, OR GET ALONG WITH OTHER PEOPLE: NOT DIFFICULT AT ALL
8. IF YOU CHECKED OFF ANY PROBLEMS, HOW DIFFICULT HAVE THESE MADE IT FOR YOU TO DO YOUR WORK, TAKE CARE OF THINGS AT HOME, OR GET ALONG WITH OTHER PEOPLE?: NOT DIFFICULT AT ALL
2. NOT BEING ABLE TO STOP OR CONTROL WORRYING: NOT AT ALL
3. WORRYING TOO MUCH ABOUT DIFFERENT THINGS: NOT AT ALL
GAD7 TOTAL SCORE: 1
4. TROUBLE RELAXING: NOT AT ALL
1. FEELING NERVOUS, ANXIOUS, OR ON EDGE: NOT AT ALL
5. BEING SO RESTLESS THAT IT IS HARD TO SIT STILL: NOT AT ALL

## 2023-04-26 NOTE — PROGRESS NOTES
Shlomo is a 33 year old who is being evaluated via a billable telephone visit.      What phone number would you like to be contacted at? [phone]  How would you like to obtain your AVS? Louisa  Distant Location (provider location):  Off-site    Assessment & Plan     ICD-10-CM    1. Hypertension goal BP (blood pressure) < 130/80  I10 losartan (COZAAR) 50 MG tablet      2. GENARO (generalized anxiety disorder)  F41.1         1. HTN   - Unclear why patient was taken off Losartan 50 mg and put on Lisinopril   - However, patient reports very bad cough and BP not controlled   - Wants to go back on Losartan   - Reviewed use and side effects, refilled   - Continue to monitor BP, send BP log in 2 weeks, also schedule BP check in clinic   - Plan recheck with physical and fasting labs in 2 months     2. Mood   - Stable on current regimen  - Reviewed use and side effects       Review of the result(s) of each unique test - See list      4/21/23 - BMP   Diagnosis or treatment significantly limited by social determinants of health - None     25 minutes spent on the date of the encounter doing chart review, history and exam, documentation and further activities as noted above    The patient indicates understanding of these issues and agrees with the plan.    Xiomara Moralez PA-C  [facility]      Mariela Keenan is a 33 year old, presenting for the following health issues:  Recheck Medication         View : No data to display.              History of Present Illness       Hypertension: He presents for follow up of hypertension.  He does check blood pressure  regularly outside of the clinic. Outside blood pressures have been over 140/90. He follows a low salt diet.     He eats 2-3 servings of fruits and vegetables daily.He consumes 1 sweetened beverage(s) daily.He exercises with enough effort to increase his heart rate 30 to 60 minutes per day.  He exercises with enough effort to increase his  heart rate 3 or less days per week. He is missing 1 dose(s) of medications per week.  He is not taking prescribed medications regularly due to remembering to take.  Today's GENARO-7 Score: 1     - Losartan 50 mg, saw covering provider in Jan - Lisinopril 10 mg   - Had cough on Lisinopril      Been taking this for 1.5 weeks   - Friday was fine           Review of Systems   Constitutional, HEENT, cardiovascular, pulmonary, gi and gu systems are negative, except as otherwise noted.      Objective       Vitals:  No vitals were obtained today due to virtual visit.    Physical Exam   healthy, alert and no distress  PSYCH: Alert and oriented times 3; coherent speech, normal   rate and volume, able to articulate logical thoughts, able   to abstract reason, no tangential thoughts, no hallucinations   or delusions  His affect is normal  RESP: No cough, no audible wheezing, able to talk in full sentences  Remainder of exam unable to be completed due to telephone visits    Diagnostics: reviewed in epic         Phone call duration: 9 minutes and 30 seconds

## 2023-05-19 ENCOUNTER — TELEPHONE (OUTPATIENT)
Dept: FAMILY MEDICINE | Facility: OTHER | Age: 33
End: 2023-05-19
Payer: COMMERCIAL

## 2023-05-19 NOTE — TELEPHONE ENCOUNTER
sertraline (ZOLOFT) 50 MG tablet      Prior Authorization Retail Medication Request    Medication/Dose: sertraline (ZOLOFT) 50 MG tablet  ICD code (if different than what is on RX):    Previously Tried and Failed:    Rationale:      Insurance Name:    Insurance ID:        Pharmacy Information (if different than what is on RX)  Name:    Phone:

## 2023-05-25 NOTE — TELEPHONE ENCOUNTER
Prior Authorization Approval    Authorization Effective Date: 5/25/2023  Authorization Expiration Date: 5/25/2024  Medication: sertraline (ZOLOFT) 50 MG tablet  Approved Dose/Quantity:   Reference #:     Insurance Company: [employer] - Phone [phone] Fax [phone]  Expected CoPay:       CoPay Card Available:      Foundation Assistance Needed:    Which Pharmacy is filling the prescription (Not needed for infusion/clinic administered): [id number] [id number] [facility] [address] [facility] [address]  Pharmacy Notified: Yes  Patient Notified: Yes

## 2023-05-25 NOTE — TELEPHONE ENCOUNTER
Central Prior Authorization Team   Phone: [phone]    PA Initiation    Medication: sertraline (ZOLOFT) 50 MG tablet  Insurance Company: [employer] - Phone [phone] Fax [phone]  Pharmacy Filling the Rx: [facility] #[id number] [place], [place] - [address] AT [facility] [address]  Filling Pharmacy Phone: [phone]  Filling Pharmacy Fax:    Start Date: 5/25/2023

## 2023-06-27 ENCOUNTER — OFFICE VISIT (OUTPATIENT)
Dept: FAMILY MEDICINE | Facility: OTHER | Age: 33
End: 2023-06-27
Payer: COMMERCIAL

## 2023-06-27 VITALS
WEIGHT: 186.31 LBS | BODY MASS INDEX: 28.24 KG/M2 | OXYGEN SATURATION: 97 % | DIASTOLIC BLOOD PRESSURE: 80 MMHG | SYSTOLIC BLOOD PRESSURE: 125 MMHG | RESPIRATION RATE: 16 BRPM | HEART RATE: 77 BPM | TEMPERATURE: 97.5 F | HEIGHT: 68 IN

## 2023-06-27 DIAGNOSIS — I10 HYPERTENSION GOAL BP (BLOOD PRESSURE) < 130/80: ICD-10-CM

## 2023-06-27 DIAGNOSIS — Z00.00 ROUTINE GENERAL MEDICAL EXAMINATION AT A HEALTH CARE FACILITY: Primary | ICD-10-CM

## 2023-06-27 DIAGNOSIS — E78.1 HYPERTRIGLYCERIDEMIA: ICD-10-CM

## 2023-06-27 DIAGNOSIS — E78.5 HYPERLIPIDEMIA LDL GOAL <100: ICD-10-CM

## 2023-06-27 DIAGNOSIS — F41.1 GAD (GENERALIZED ANXIETY DISORDER): ICD-10-CM

## 2023-06-27 DIAGNOSIS — R73.09 ELEVATED GLUCOSE: ICD-10-CM

## 2023-06-27 LAB
ANION GAP SERPL CALCULATED.3IONS-SCNC: 10 MMOL/L (ref 7–15)
BUN SERPL-MCNC: 12.7 MG/DL (ref 6–20)
CALCIUM SERPL-MCNC: 9.4 MG/DL (ref 8.6–10)
CHLORIDE SERPL-SCNC: 101 MMOL/L (ref 98–107)
CHOLEST SERPL-MCNC: 221 MG/DL
CREAT SERPL-MCNC: 0.91 MG/DL (ref 0.67–1.17)
DEPRECATED HCO3 PLAS-SCNC: 28 MMOL/L (ref 22–29)
GFR SERPL CREATININE-BSD FRML MDRD: >90 ML/MIN/1.73M2
GLUCOSE SERPL-MCNC: 109 MG/DL (ref 70–99)
HDLC SERPL-MCNC: 41 MG/DL
LDLC SERPL CALC-MCNC: 120 MG/DL
NONHDLC SERPL-MCNC: 180 MG/DL
POTASSIUM SERPL-SCNC: 4.5 MMOL/L (ref 3.4–5.3)
SODIUM SERPL-SCNC: 139 MMOL/L (ref 136–145)
TRIGL SERPL-MCNC: 301 MG/DL

## 2023-06-27 PROCEDURE — 36415 COLL VENOUS BLD VENIPUNCTURE: CPT | Performed by: PHYSICIAN ASSISTANT

## 2023-06-27 PROCEDURE — 99213 OFFICE O/P EST LOW 20 MIN: CPT | Mod: 25 | Performed by: PHYSICIAN ASSISTANT

## 2023-06-27 PROCEDURE — 80048 BASIC METABOLIC PNL TOTAL CA: CPT | Performed by: PHYSICIAN ASSISTANT

## 2023-06-27 PROCEDURE — 80061 LIPID PANEL: CPT | Performed by: PHYSICIAN ASSISTANT

## 2023-06-27 PROCEDURE — 99395 PREV VISIT EST AGE 18-39: CPT | Performed by: PHYSICIAN ASSISTANT

## 2023-06-27 RX ORDER — LOSARTAN POTASSIUM 50 MG/1
50 TABLET ORAL DAILY
Qty: 90 TABLET | Refills: 1 | Status: SHIPPED | OUTPATIENT
Start: 2023-06-27 | End: 2024-05-09

## 2023-06-27 ASSESSMENT — ENCOUNTER SYMPTOMS
MYALGIAS: 0
DYSURIA: 0
HEARTBURN: 0
CHILLS: 0
CONSTIPATION: 0
HEADACHES: 0
PALPITATIONS: 0
EYE PAIN: 0
JOINT SWELLING: 0
WEAKNESS: 0
DIARRHEA: 0
COUGH: 0
SORE THROAT: 0
PARESTHESIAS: 0
ARTHRALGIAS: 0
DIZZINESS: 0
NAUSEA: 0
HEMATURIA: 0
FEVER: 0
ABDOMINAL PAIN: 0
NERVOUS/ANXIOUS: 0
HEMATOCHEZIA: 0
SHORTNESS OF BREATH: 0

## 2023-06-27 ASSESSMENT — PAIN SCALES - GENERAL: PAINLEVEL: NO PAIN (0)

## 2023-06-27 NOTE — PROGRESS NOTES
SUBJECTIVE:   CC: Shlomo is an 33 year old who presents for preventative health visit.       6/27/2023     6:52 AM   Additional Questions   Roomed by Gloria     Healthy Habits:    Getting at least 3 servings of Calcium per day:  Yes    Bi-annual eye exam:  NO    Dental care twice a year:  NO    Sleep apnea or symptoms of sleep apnea:  None    Diet:  Regular (no restrictions)    Frequency of exercise:  1 day/week    Duration of exercise:  30-45 minutes    Taking medications regularly:  Yes    Medication side effects:  None    PHQ-2 Total Score:    Additional concerns today:  No    - BP at home - 125-135/<90   - Cough gone with switch back to Losartan  - Feeling good   - No side effects     - Mood feeling stable on Sertraline   - 4th baby coming in October       Have you ever done Advance Care Planning? (For example, a Health Directive, POLST, or a discussion with a medical provider or your loved ones about your wishes): No, advance care planning information given to patient to review.  Advanced care planning was discussed at today's visit.    Social History     Tobacco Use     Smoking status: Never     Smokeless tobacco: Current     Types: Chew   Substance Use Topics     Alcohol use: Yes     Comment: twice a week; few drinks           6/27/2023     6:53 AM   Alcohol Use   Prescreen: >3 drinks/day or >7 drinks/week? Yes   AUDIT SCORE  16       Last PSA:   PSA   Date Value Ref Range Status   02/09/2018 0.33 0 - 4 ug/L Final     Comment:     Assay Method:  Chemiluminescence using Siemens Vista analyzer     Prostate Specific Antigen Screen   Date Value Ref Range Status   01/09/2023 0.32 0.00 - 4.00 ug/L Final       Reviewed orders with patient. Reviewed health maintenance and updated orders accordingly - Yes  Lab work is in process  Labs reviewed in EPIC  BP Readings from Last 3 Encounters:   06/27/23 132/88   01/09/23 (!) 136/96   11/15/22 138/82    Wt Readings from Last 3 Encounters:   06/27/23 84.5 kg (186 lb 5 oz)  "  01/09/23 88.7 kg (195 lb 8 oz)   11/15/22 85.5 kg (188 lb 8 oz)           Reviewed and updated as needed this visit by clinical staff   Tobacco  Allergies  Meds  Problems  Med Hx  Surg Hx  Fam Hx          Reviewed and updated as needed this visit by Provider   Tobacco  Allergies  Meds  Problems  Med Hx  Surg Hx  Fam Hx         Past Medical History:   Diagnosis Date     NO ACTIVE PROBLEMS       Past Surgical History:   Procedure Laterality Date     FINGER SURGERY         Review of Systems   Constitutional: Negative for chills and fever.   HENT: Negative for congestion, ear pain, hearing loss and sore throat.    Eyes: Negative for pain and visual disturbance.   Respiratory: Negative for cough and shortness of breath.    Cardiovascular: Negative for chest pain, palpitations and peripheral edema.   Gastrointestinal: Negative for abdominal pain, constipation, diarrhea, heartburn, hematochezia and nausea.   Genitourinary: Negative for dysuria, hematuria, impotence, penile discharge and urgency.   Musculoskeletal: Negative for arthralgias, joint swelling and myalgias.   Skin: Negative for rash.   Neurological: Negative for dizziness, weakness, headaches and paresthesias.   Psychiatric/Behavioral: Negative for mood changes. The patient is not nervous/anxious.          OBJECTIVE:   /88   Pulse 77   Temp 97.5  F (36.4  C) (Temporal)   Resp 16   Ht 1.727 m (5' 8\")   Wt 84.5 kg (186 lb 5 oz)   SpO2 97%   BMI 28.33 kg/m      Physical Exam  Constitutional:       General: He is not in acute distress.     Appearance: He is well-developed. He is not diaphoretic.   HENT:      Right Ear: External ear normal.      Left Ear: External ear normal.      Nose: Nose normal.   Eyes:      General:         Right eye: No discharge.         Left eye: No discharge.      Pupils: Pupils are equal, round, and reactive to light.   Neck:      Thyroid: No thyromegaly.      Vascular: No JVD.      Trachea: No tracheal " deviation.   Cardiovascular:      Rate and Rhythm: Normal rate and regular rhythm.      Heart sounds: Normal heart sounds. No murmur heard.     No friction rub. No gallop.   Pulmonary:      Effort: Pulmonary effort is normal.      Breath sounds: Normal breath sounds. No stridor. No wheezing or rales.   Abdominal:      General: Bowel sounds are normal. There is no distension.      Palpations: Abdomen is soft. There is no mass.      Tenderness: There is no abdominal tenderness. There is no guarding or rebound.      Hernia: No hernia is present.   Musculoskeletal:         General: Normal range of motion.      Cervical back: Normal range of motion and neck supple.   Lymphadenopathy:      Cervical: No cervical adenopathy.   Skin:     General: Skin is warm.   Neurological:      Mental Status: He is alert and oriented to person, place, and time.   Psychiatric:         Behavior: Behavior normal.         Thought Content: Thought content normal.         Judgment: Judgment normal.         Diagnostic Test Results:  Labs reviewed in Epic  See orders pending in Epic     ASSESSMENT/PLAN:       ICD-10-CM    1. Routine general medical examination at a health care facility  Z00.00       2. Hypertension goal BP (blood pressure) < 130/80  I10 Basic metabolic panel  (Ca, Cl, CO2, Creat, Gluc, K, Na, BUN)     losartan (COZAAR) 50 MG tablet     Basic metabolic panel  (Ca, Cl, CO2, Creat, Gluc, K, Na, BUN)     OFFICE/OUTPT VISIT,EST,LEVL III      3. Elevated glucose  R73.09 Basic metabolic panel  (Ca, Cl, CO2, Creat, Gluc, K, Na, BUN)     Basic metabolic panel  (Ca, Cl, CO2, Creat, Gluc, K, Na, BUN)     OFFICE/OUTPT VISIT,EST,LEVL III      4. Hyperlipidemia LDL goal <100  E78.5 Lipid panel reflex to direct LDL Fasting     Lipid panel reflex to direct LDL Fasting     OFFICE/OUTPT VISIT,EST,LEVL III      5. Hypertriglyceridemia  E78.1 Lipid panel reflex to direct LDL Fasting     Lipid panel reflex to direct LDL Fasting     OFFICE/OUTPT  "VISIT,EST,LEVL III      6. GENARO (generalized anxiety disorder)  F41.1 OFFICE/OUTPT VISIT,EST,LEVL III        2. HTN   - Marked improvement in cough with switch back to Losartan (from Lisinopril)   - BP under goal at home and here in clinic today  - Reviewed use and side effects, refilled   - Will update BMP today as hasn't been checked since switch in medication     2-5.   - Patient reports decreased ETOH consumption, will recheck labs today as not currently on any medications   - Results will be communicated to patient when available and appropriate medication changes made if necessary     6. Mood   - Stable on Sertraline 75 mg   - Reviewed use and side effects, refilled       Patient has been advised of split billing requirements and indicates understanding: Yes      COUNSELING:   Reviewed preventive health counseling, as reflected in patient instructions  Special attention given to:        Regular exercise       Healthy diet/nutrition       Immunizations    Declined: all due to Concerns about side effects/safety           Prostate cancer screening       Advance Care Planning      BMI:   Estimated body mass index is 28.33 kg/m  as calculated from the following:    Height as of this encounter: 1.727 m (5' 8\").    Weight as of this encounter: 84.5 kg (186 lb 5 oz).   Weight management plan: Discussed healthy diet and exercise guidelines      He reports that he has never smoked. His smokeless tobacco use includes chew.      Review of the result(s) of each unique test - See list      4/21/23 - BMP        1/9/23 - PSA, Lipid    Diagnosis or treatment significantly limited by social determinants of health - None     20 minutes spent on the date of the encounter doing chart review, history and exam, documentation and further activities as noted above    The patient indicates understanding of these issues and agrees with the plan.    Follow up: 1 year or PRN     Xiomara Hussein-JUAN [facility] " [facility]

## 2023-06-27 NOTE — RESULT ENCOUNTER NOTE
Hello Shlomo    Your results were normal. Cholesterol improved a little bit. Glucose about the same. We will continue to monitor yearly     The results are attached for your review.       Sandor Moralez PA-C

## 2024-03-19 ENCOUNTER — MYC REFILL (OUTPATIENT)
Dept: FAMILY MEDICINE | Facility: OTHER | Age: 34
End: 2024-03-19
Payer: COMMERCIAL

## 2024-03-19 DIAGNOSIS — F41.1 GAD (GENERALIZED ANXIETY DISORDER): ICD-10-CM

## 2024-05-09 DIAGNOSIS — I10 HYPERTENSION GOAL BP (BLOOD PRESSURE) < 130/80: ICD-10-CM

## 2024-05-09 RX ORDER — LOSARTAN POTASSIUM 50 MG/1
50 TABLET ORAL DAILY
Qty: 90 TABLET | Refills: 0 | Status: SHIPPED | OUTPATIENT
Start: 2024-05-09 | End: 2024-08-05

## 2024-05-13 NOTE — TELEPHONE ENCOUNTER
Patient has viewed the [facility] Calendar message and has not scheduled an appointment at this time.

## 2024-05-28 ENCOUNTER — PATIENT OUTREACH (OUTPATIENT)
Dept: CARE COORDINATION | Facility: CLINIC | Age: 34
End: 2024-05-28
Payer: COMMERCIAL

## 2024-06-15 DIAGNOSIS — F41.1 GAD (GENERALIZED ANXIETY DISORDER): ICD-10-CM

## 2024-08-05 ENCOUNTER — OFFICE VISIT (OUTPATIENT)
Dept: FAMILY MEDICINE | Facility: OTHER | Age: 34
End: 2024-08-05
Payer: COMMERCIAL

## 2024-08-05 VITALS
HEART RATE: 76 BPM | TEMPERATURE: 97.3 F | SYSTOLIC BLOOD PRESSURE: 124 MMHG | DIASTOLIC BLOOD PRESSURE: 84 MMHG | BODY MASS INDEX: 27.25 KG/M2 | OXYGEN SATURATION: 99 % | RESPIRATION RATE: 24 BRPM | HEIGHT: 69 IN | WEIGHT: 184 LBS

## 2024-08-05 DIAGNOSIS — F41.1 GAD (GENERALIZED ANXIETY DISORDER): ICD-10-CM

## 2024-08-05 DIAGNOSIS — E78.5 HYPERLIPIDEMIA LDL GOAL <100: ICD-10-CM

## 2024-08-05 DIAGNOSIS — R53.83 OTHER FATIGUE: ICD-10-CM

## 2024-08-05 DIAGNOSIS — E78.1 HYPERTRIGLYCERIDEMIA: ICD-10-CM

## 2024-08-05 DIAGNOSIS — R73.09 ELEVATED GLUCOSE: ICD-10-CM

## 2024-08-05 DIAGNOSIS — Z00.00 ROUTINE GENERAL MEDICAL EXAMINATION AT A HEALTH CARE FACILITY: Primary | ICD-10-CM

## 2024-08-05 DIAGNOSIS — I10 HYPERTENSION GOAL BP (BLOOD PRESSURE) < 130/80: ICD-10-CM

## 2024-08-05 DIAGNOSIS — D69.6 THROMBOCYTOPENIA (H): ICD-10-CM

## 2024-08-05 LAB
ANION GAP SERPL CALCULATED.3IONS-SCNC: 12 MMOL/L (ref 7–15)
BUN SERPL-MCNC: 18 MG/DL (ref 6–20)
CALCIUM SERPL-MCNC: 9.1 MG/DL (ref 8.8–10.4)
CHLORIDE SERPL-SCNC: 103 MMOL/L (ref 98–107)
CHOLEST SERPL-MCNC: 215 MG/DL
CREAT SERPL-MCNC: 0.85 MG/DL (ref 0.67–1.17)
EGFRCR SERPLBLD CKD-EPI 2021: >90 ML/MIN/1.73M2
ERYTHROCYTE [DISTWIDTH] IN BLOOD BY AUTOMATED COUNT: 13 % (ref 10–15)
FASTING STATUS PATIENT QL REPORTED: YES
FASTING STATUS PATIENT QL REPORTED: YES
FERRITIN SERPL-MCNC: 292 NG/ML (ref 31–409)
GLUCOSE SERPL-MCNC: 100 MG/DL (ref 70–99)
HCO3 SERPL-SCNC: 25 MMOL/L (ref 22–29)
HCT VFR BLD AUTO: 38.3 % (ref 40–53)
HDLC SERPL-MCNC: 39 MG/DL
HGB BLD-MCNC: 13.2 G/DL (ref 13.3–17.7)
IRON BINDING CAPACITY (ROCHE): 301 UG/DL (ref 240–430)
IRON SATN MFR SERPL: 50 % (ref 15–46)
IRON SERPL-MCNC: 149 UG/DL (ref 61–157)
LDLC SERPL CALC-MCNC: 99 MG/DL
MCH RBC QN AUTO: 29.7 PG (ref 26.5–33)
MCHC RBC AUTO-ENTMCNC: 34.5 G/DL (ref 31.5–36.5)
MCV RBC AUTO: 86 FL (ref 78–100)
NONHDLC SERPL-MCNC: 176 MG/DL
PLATELET # BLD AUTO: 113 10E3/UL (ref 150–450)
POTASSIUM SERPL-SCNC: 4.3 MMOL/L (ref 3.4–5.3)
RBC # BLD AUTO: 4.44 10E6/UL (ref 4.4–5.9)
SODIUM SERPL-SCNC: 140 MMOL/L (ref 135–145)
TRIGL SERPL-MCNC: 383 MG/DL
TSH SERPL DL<=0.005 MIU/L-ACNC: 0.98 UIU/ML (ref 0.3–4.2)
VIT B12 SERPL-MCNC: 467 PG/ML (ref 232–1245)
VIT D+METAB SERPL-MCNC: 34 NG/ML (ref 20–50)
WBC # BLD AUTO: 6.3 10E3/UL (ref 4–11)

## 2024-08-05 PROCEDURE — 80048 BASIC METABOLIC PNL TOTAL CA: CPT | Performed by: PHYSICIAN ASSISTANT

## 2024-08-05 PROCEDURE — 84443 ASSAY THYROID STIM HORMONE: CPT | Performed by: PHYSICIAN ASSISTANT

## 2024-08-05 PROCEDURE — 83550 IRON BINDING TEST: CPT | Performed by: PHYSICIAN ASSISTANT

## 2024-08-05 PROCEDURE — 36415 COLL VENOUS BLD VENIPUNCTURE: CPT | Performed by: PHYSICIAN ASSISTANT

## 2024-08-05 PROCEDURE — 80061 LIPID PANEL: CPT | Performed by: PHYSICIAN ASSISTANT

## 2024-08-05 PROCEDURE — 85027 COMPLETE CBC AUTOMATED: CPT | Performed by: PHYSICIAN ASSISTANT

## 2024-08-05 PROCEDURE — 83540 ASSAY OF IRON: CPT | Performed by: PHYSICIAN ASSISTANT

## 2024-08-05 PROCEDURE — 99395 PREV VISIT EST AGE 18-39: CPT | Performed by: PHYSICIAN ASSISTANT

## 2024-08-05 PROCEDURE — 99214 OFFICE O/P EST MOD 30 MIN: CPT | Mod: 25 | Performed by: PHYSICIAN ASSISTANT

## 2024-08-05 PROCEDURE — 82728 ASSAY OF FERRITIN: CPT | Performed by: PHYSICIAN ASSISTANT

## 2024-08-05 PROCEDURE — 82306 VITAMIN D 25 HYDROXY: CPT | Performed by: PHYSICIAN ASSISTANT

## 2024-08-05 PROCEDURE — 82607 VITAMIN B-12: CPT | Performed by: PHYSICIAN ASSISTANT

## 2024-08-05 RX ORDER — LOSARTAN POTASSIUM 50 MG/1
50 TABLET ORAL DAILY
Qty: 90 TABLET | Refills: 3 | Status: SHIPPED | OUTPATIENT
Start: 2024-08-05

## 2024-08-05 SDOH — HEALTH STABILITY: PHYSICAL HEALTH: ON AVERAGE, HOW MANY DAYS PER WEEK DO YOU ENGAGE IN MODERATE TO STRENUOUS EXERCISE (LIKE A BRISK WALK)?: 3 DAYS

## 2024-08-05 ASSESSMENT — ANXIETY QUESTIONNAIRES
1. FEELING NERVOUS, ANXIOUS, OR ON EDGE: SEVERAL DAYS
3. WORRYING TOO MUCH ABOUT DIFFERENT THINGS: NOT AT ALL
GAD7 TOTAL SCORE: 3
4. TROUBLE RELAXING: NOT AT ALL
7. FEELING AFRAID AS IF SOMETHING AWFUL MIGHT HAPPEN: SEVERAL DAYS
2. NOT BEING ABLE TO STOP OR CONTROL WORRYING: NOT AT ALL
5. BEING SO RESTLESS THAT IT IS HARD TO SIT STILL: NOT AT ALL
7. FEELING AFRAID AS IF SOMETHING AWFUL MIGHT HAPPEN: SEVERAL DAYS
6. BECOMING EASILY ANNOYED OR IRRITABLE: SEVERAL DAYS
8. IF YOU CHECKED OFF ANY PROBLEMS, HOW DIFFICULT HAVE THESE MADE IT FOR YOU TO DO YOUR WORK, TAKE CARE OF THINGS AT HOME, OR GET ALONG WITH OTHER PEOPLE?: NOT DIFFICULT AT ALL
GAD7 TOTAL SCORE: 3
IF YOU CHECKED OFF ANY PROBLEMS ON THIS QUESTIONNAIRE, HOW DIFFICULT HAVE THESE PROBLEMS MADE IT FOR YOU TO DO YOUR WORK, TAKE CARE OF THINGS AT HOME, OR GET ALONG WITH OTHER PEOPLE: NOT DIFFICULT AT ALL

## 2024-08-05 ASSESSMENT — SOCIAL DETERMINANTS OF HEALTH (SDOH): HOW OFTEN DO YOU GET TOGETHER WITH FRIENDS OR RELATIVES?: TWICE A WEEK

## 2024-08-05 ASSESSMENT — PAIN SCALES - GENERAL: PAINLEVEL: NO PAIN (0)

## 2024-08-05 NOTE — RESULT ENCOUNTER NOTE
Kirti Keenan    Your results show cholesterol up quite a bit. With the triglycerides in the 300s, I do recommend we start a medication to lower. Rest of labs normal. Still awaiting a few.     The results are attached for your review.       Sandor Moralez PA-C

## 2024-08-05 NOTE — PROGRESS NOTES
"Preventive Care Visit  [facility]  Xiomara CHRISTIN Moralez PA-C, Family Medicine  Aug 5, 2024    Assessment & Plan     ICD-10-CM    1. Routine general medical examination at a health care facility  Z00.00       2. Hyperlipidemia LDL goal <100  E78.5 Lipid panel reflex to direct LDL Fasting     Lipid panel reflex to direct LDL Fasting     OFFICE/OUTPT VISIT,EST,LEVL IV      3. Hypertriglyceridemia  E78.1 Lipid panel reflex to direct LDL Fasting     Lipid panel reflex to direct LDL Fasting     OFFICE/OUTPT VISIT,EST,LEVL IV      4. Hypertension goal BP (blood pressure) < 130/80  I10 Basic metabolic panel  (Ca, Cl, CO2, Creat, Gluc, K, Na, BUN)     losartan (COZAAR) 50 MG tablet     Basic metabolic panel  (Ca, Cl, CO2, Creat, Gluc, K, Na, BUN)     OFFICE/OUTPT VISIT,EST,LEVL IV      5. Elevated glucose  R73.09 Basic metabolic panel  (Ca, Cl, CO2, Creat, Gluc, K, Na, BUN)     Basic metabolic panel  (Ca, Cl, CO2, Creat, Gluc, K, Na, BUN)     OFFICE/OUTPT VISIT,EST,LEVL IV      6. Thrombocytopenia (H24)  D69.6 CBC with platelets     CBC with platelets     OFFICE/OUTPT VISIT,EST,LEVL IV      7. Other fatigue  R53.83 TSH with free T4 reflex     Vitamin B12     Vitamin D Deficiency     Iron and iron binding capacity     Ferritin     TSH with free T4 reflex     Vitamin B12     Vitamin D Deficiency     Iron and iron binding capacity     Ferritin     OFFICE/OUTPT VISIT,EST,LEVL IV      8. GENARO (generalized anxiety disorder)  F41.1 sertraline (ZOLOFT) 50 MG tablet     OFFICE/OUTPT VISIT,EST,LEVL IV          Patient has been advised of split billing requirements and indicates understanding: Yes    BMI  Estimated body mass index is 27.57 kg/m  as calculated from the following:    Height as of this encounter: 1.74 m (5' 8.5\").    Weight as of this encounter: 83.5 kg (184 lb).   Weight management plan: Discussed healthy diet and exercise guidelines    Counseling  Appropriate preventive services were " addressed with this patient via screening, questionnaire, or discussion as appropriate for fall prevention, nutrition, physical activity, Tobacco-use cessation, weight loss and cognition.  Checklist reviewing preventive services available has been given to the patient.  Reviewed patient's diet, addressing concerns and/or questions.   He is at risk for lack of exercise and has been provided with information to increase physical activity for the benefit of his well-being.   The patient reports drinking more than 3 alcoholic drinks per day and/or more than 7 drhnks per week. The patient was counseled and given information about possible harmful effects of excessive alcohol intake.    2 & 3.   - Not on medication, reviewed last few years labs   - encouraged cutting back on ETOH to help with TGs   - May need to consider fenofibrate in the future   - Will get fasting labs today. Await results     4. HTN   - Stable on current regimen   - Will update labs today, await results   - Reviewed medication use and side effects, refilled     5. Elevated glucose  - Mild, monitoring yearly     6. Monitoring, ongoing, worked up in the past     7. Other fatigue   - Wondering if has a vitamin deficiency, doesn't get good quality sleep but would like to rule out other reasons   - Discussed labs including anemia, thyroid, vitamin/electrolyte deficiency, etc.  - Will await results     8. GENARO   - Stable on Sertraline, did decrease over the past year from 75 mg to 50 mg and feeling really good   - Reviewed medication use and side effects, refilled         Review of the result(s) of each unique test - See list        6/27/23 - BMP, Lipid      1/9/23 - all labs   Diagnosis or treatment significantly limited by social determinants of health - None     20 minutes spent on the date of the encounter doing chart review, history and exam, documentation and further activities as noted above    Follow up: 1 year pending normal labs     Sandor  JUAN Moralez  [facility] - Karla Keenan is a 34 year old, presenting for the following:  Physical        8/5/2024     9:24 AM   Additional Questions   Roomed by Tenzin VICENTE   Accompanied by N/A         8/5/2024   Forms   Any forms needing to be completed Yes          8/5/2024     9:24 AM   Patient Reported Additional Medications   Patient reports taking the following new medications n/a        Health Care Directive  Patient does not have a Health Care Directive or Living Will: Discussed advance care planning with patient; however, patient declined at this time.    HPI      Hypertension Follow-up    Do you check your blood pressure regularly outside of the clinic? Yes   Are you following a low salt diet? Yes  Are your blood pressures ever more than 140 on the top number (systolic) OR more   than 90 on the bottom number (diastolic), for example 140/90? Yes    Anxiety   How are you doing with your anxiety since your last visit? No change  Are you having other symptoms that might be associated with anxiety? No  Have you had a significant life event? No   Are you feeling depressed? No  Do you have any concerns with your use of alcohol or other drugs? Yes:  Drinking    Social History     Tobacco Use    Smoking status: Never     Passive exposure: Never    Smokeless tobacco: Current     Types: Chew   Vaping Use    Vaping status: Never Used   Substance Use Topics    Alcohol use: Yes     Comment: twice a week; few drinks    Drug use: No         11/15/2022     8:00 AM 4/26/2023     7:12 AM 8/5/2024     9:12 AM   GENARO-7 SCORE   Total Score  1 (minimal anxiety) 3 (minimal anxiety)   Total Score 4 1 3         4/18/2022     2:52 PM 5/17/2022     4:23 PM 11/15/2022     8:00 AM   PHQ   PHQ-9 Total Score 1 0 1   Q9: Thoughts of better off dead/self-harm past 2 weeks Not at all Not at all Not at all         11/15/2022     8:00 AM   Last PHQ-9   1.  Little interest or pleasure in doing things  0   2.  Feeling down, depressed, or hopeless 0   3.  Trouble falling or staying asleep, or sleeping too much 0   4.  Feeling tired or having little energy 0   5.  Poor appetite or overeating 1   6.  Feeling bad about yourself 0   7.  Trouble concentrating 0   8.  Moving slowly or restless 0   Q9: Thoughts of better off dead/self-harm past 2 weeks 0   PHQ-9 Total Score 1   Difficulty at work, home, or with people Somewhat difficult         8/5/2024     9:12 AM   GENARO-7    1. Feeling nervous, anxious, or on edge 1   2. Not being able to stop or control worrying 0   3. Worrying too much about different things 0   4. Trouble relaxing 0   5. Being so restless that it is hard to sit still 0   6. Becoming easily annoyed or irritable 1   7. Feeling afraid, as if something awful might happen 1   GENARO-7 Total Score 3   If you checked any problems, how difficult have they made it for you to do your work, take care of things at home, or get along with other people? Not difficult at all           8/5/2024   General Health   How would you rate your overall physical health? Good   Feel stress (tense, anxious, or unable to sleep) Not at all            8/5/2024   Nutrition   Three or more servings of calcium each day? Yes   Diet: Regular (no restrictions)   How many servings of fruit and vegetables per day? (!) 2-3   How many sweetened beverages each day? 0-1            8/5/2024   Exercise   Days per week of moderate/strenous exercise 3 days            8/5/2024   Social Factors   Frequency of gathering with friends or relatives Twice a week   Worry food won't last until get money to buy more No   Food not last or not have enough money for food? No   Do you have housing? (Housing is defined as stable permanent housing and does not include staying ouside in a car, in a tent, in an abandoned building, in an overnight shelter, or couch-surfing.) Yes   Are you worried about losing your housing? No   Lack of transportation? No   Unable to  get utilities (heat,electricity)? No            8/5/2024   Dental   Dentist two times every year? Yes            8/5/2024   TB Screening   Were you born outside of the [place]? No            Today's PHQ-2 Score:       8/5/2024     9:17 AM   PHQ-2 ( 1999 [employer])   Q1: Little interest or pleasure in doing things 0   Q2: Feeling down, depressed or hopeless 0   PHQ-2 Score 0   Q1: Little interest or pleasure in doing things Not at all   Q2: Feeling down, depressed or hopeless Not at all   PHQ-2 Score 0           8/5/2024   Substance Use   Alcohol more than 3/day or more than 7/wk Yes   How often do you have a drink containing alcohol 2 to 3 times a week   How many alcohol drinks on typical day 5 or 6   How often do you have 5+ drinks at one occasion Weekly   Audit 2/3 Score 5   How often not able to stop drinking once started Less than monthly   How often failed to do what normally expected Less than monthly   How often needed first drink in am after a heavy drinking session Never   How often feeling of guilt or remorse after drinking Less than monthly   How often unable to remember what happened the night before Never   Have you or someone else been injured because of your drinking No   Has anyone been concerned or suggested you cut down on drinking Yes, but not in the last year   TOTAL SCORE - AUDIT 13   Do you use any other substances recreationally? No        Social History     Tobacco Use    Smoking status: Never     Passive exposure: Never    Smokeless tobacco: Current     Types: Chew   Vaping Use    Vaping status: Never Used   Substance Use Topics    Alcohol use: Yes     Comment: twice a week; few drinks    Drug use: No         8/5/2024   STI Screening   New sexual partner(s) since last STI/HIV test? No            8/5/2024   Contraception/Family Planning   Questions about contraception or family planning No      Reviewed and updated as needed this visit by Provider   Tobacco  Allergies  Meds  Problems  Med Hx   "Surg Hx  Fam Hx            Past Medical History:   Diagnosis Date    NO ACTIVE PROBLEMS      Past Surgical History:   Procedure Laterality Date    FINGER SURGERY       Lab work is in process  Labs reviewed in EPIC  BP Readings from Last 3 Encounters:   08/05/24 124/84   06/27/23 125/80   01/09/23 (!) 136/96    Wt Readings from Last 3 Encounters:   08/05/24 83.5 kg (184 lb)   06/27/23 84.5 kg (186 lb 5 oz)   01/09/23 88.7 kg (195 lb 8 oz)                 Review of Systems  Constitutional, neuro, ENT, endocrine, pulmonary, cardiac, gastrointestinal, genitourinary, musculoskeletal, integument and psychiatric systems are negative, except as otherwise noted.     Objective    Exam  /84 (Cuff Size: Adult Regular)   Pulse 76   Temp 97.3  F (36.3  C) (Temporal)   Resp 24   Ht 1.74 m (5' 8.5\")   Wt 83.5 kg (184 lb)   SpO2 99%   BMI 27.57 kg/m     Estimated body mass index is 27.57 kg/m  as calculated from the following:    Height as of this encounter: 1.74 m (5' 8.5\").    Weight as of this encounter: 83.5 kg (184 lb).    Physical Exam  Constitutional:       General: He is not in acute distress.     Appearance: He is well-developed. He is not diaphoretic.   HENT:      Right Ear: External ear normal.      Left Ear: External ear normal.      Nose: Nose normal.   Eyes:      General:         Right eye: No discharge.         Left eye: No discharge.      Pupils: Pupils are equal, round, and reactive to light.   Neck:      Thyroid: No thyromegaly.      Vascular: No JVD.      Trachea: No tracheal deviation.   Cardiovascular:      Rate and Rhythm: Normal rate and regular rhythm.      Heart sounds: Normal heart sounds. No murmur heard.     No friction rub. No gallop.   Pulmonary:      Effort: Pulmonary effort is normal.      Breath sounds: Normal breath sounds. No stridor. No wheezing or rales.   Abdominal:      General: Bowel sounds are normal. There is no distension.      Palpations: Abdomen is soft. There is no mass. "      Tenderness: There is no abdominal tenderness. There is no guarding or rebound.      Hernia: No hernia is present.   Musculoskeletal:         General: Normal range of motion.      Cervical back: Normal range of motion and neck supple.   Lymphadenopathy:      Cervical: No cervical adenopathy.   Skin:     General: Skin is warm.   Neurological:      Mental Status: He is alert and oriented to person, place, and time.   Psychiatric:         Behavior: Behavior normal.         Thought Content: Thought content normal.         Judgment: Judgment normal.             Diagnostics: reviewed in Epic, see orders pending in Epic     Signed Electronically by: Xiomara Moralez PA-C

## 2024-08-05 NOTE — RESULT ENCOUNTER NOTE
Kirti Keenan    The rest of your labs were normal.     The results are attached for your review.       Sandor Moralez PA-C

## 2024-08-05 NOTE — PATIENT INSTRUCTIONS
Patient Education   Preventive Care Advice   This is general advice given by our system to help you stay healthy. However, your care team may have specific advice just for you. Please talk to your care team about your preventive care needs.  Nutrition  Eat 5 or more servings of fruits and vegetables each day.  Try wheat bread, brown rice and whole grain pasta (instead of white bread, rice, and pasta).  Get enough calcium and vitamin D. Check the label on foods and aim for 100% of the RDA (recommended daily allowance).  Lifestyle  Exercise at least 150 minutes each week  (30 minutes a day, 5 days a week).  Do muscle strengthening activities 2 days a week. These help control your weight and prevent disease.  No smoking.  Wear sunscreen to prevent skin cancer.  Have a dental exam and cleaning every 6 months.  Yearly exams  See your health care team every year to talk about:  Any changes in your health.  Any medicines your care team has prescribed.  Preventive care, family planning, and ways to prevent chronic diseases.  Shots (vaccines)   HPV shots (up to age 26), if you've never had them before.  Hepatitis B shots (up to age 59), if you've never had them before.  COVID-19 shot: Get this shot when it's due.  Flu shot: Get a flu shot every year.  Tetanus shot: Get a tetanus shot every 10 years.  Pneumococcal, hepatitis A, and RSV shots: Ask your care team if you need these based on your risk.  Shingles shot (for age 50 and up)  General health tests  Diabetes screening:  Starting at age 35, Get screened for diabetes at least every 3 years.  If you are younger than age 35, ask your care team if you should be screened for diabetes.  Cholesterol test: At age 39, start having a cholesterol test every 5 years, or more often if advised.  Bone density scan (DEXA): At age 50, ask your care team if you should have this scan for osteoporosis (brittle bones).  Hepatitis C: Get tested at least once in your life.  STIs (sexually  transmitted infections)  Before age 24: Ask your care team if you should be screened for STIs.  After age 24: Get screened for STIs if you're at risk. You are at risk for STIs (including HIV) if:  You are sexually active with more than one person.  You don't use condoms every time.  You or a partner was diagnosed with a sexually transmitted infection.  If you are at risk for HIV, ask about PrEP medicine to prevent HIV.  Get tested for HIV at least once in your life, whether you are at risk for HIV or not.  Cancer screening tests  Cervical cancer screening: If you have a cervix, begin getting regular cervical cancer screening tests starting at age 21.  Breast cancer scan (mammogram): If you've ever had breasts, begin having regular mammograms starting at age 40. This is a scan to check for breast cancer.  Colon cancer screening: It is important to start screening for colon cancer at age 45.  Have a colonoscopy test every 10 years (or more often if you're at risk) Or, ask your provider about stool tests like a FIT test every year or Cologuard test every 3 years.  To learn more about your testing options, visit:   .  For help making a decision, visit:   https://bit.ly/ki50086.  Prostate cancer screening test: If you have a prostate, ask your care team if a prostate cancer screening test (PSA) at age 55 is right for you.  Lung cancer screening: If you are a current or former smoker ages 50 to 80, ask your care team if ongoing lung cancer screenings are right for you.  For informational purposes only. Not to replace the advice of your health care provider. Copyright   2023 [facility]. All rights reserved. Clinically reviewed by the [facility]itions [facility]. [employer] 779805 - REV 01/24.  9 Ways to Cut Back on Drinking  Maybe you've found yourself drinking more alcohol than you'd prefer. If you want to cut back, here are some ideas to try.    Think before you drink.  Do you really want a drink,  "or is it just a habit? If you're used to having a drink at a certain time, try doing something else then.     Look for substitutes.  Find some no-alcohol drinks that you enjoy, like flavored seltzer water, tea with honey, or tonic with a slice of lime. Or try alcohol-free beer or \"virgin\" cocktails (without the alcohol).     Drink more water.  Use water to quench your thirst. Drink a glass of water before you have any alcohol. Have another glass along with every drink or between drinks.     Shrink your drink.  For example, have a bottle of beer instead of a pint. Use a smaller glass for wine. Choose drinks with lower alcohol content (ABV%). Or use less liquor and more mixer in cocktails.     Slow down.  It's easy to drink quickly and without thinking about it. Pay attention, and make each drink last longer.     Do the math.  Total up how much you spend on alcohol each month. How much is that a year? If you cut back, what could you do with the money you save?     Take a break.  Choose a day or two each week when you won't drink at all. Notice how you feel on those days, physically and emotionally. How did you sleep? Do you feel better? Over time, add more break days.     Count calories.  Would you like to lose some weight? For some people that's a good motivator for cutting back. Figure out how many calories are in each drink. How many does that add up to in a day? In a week? In a month?     Practice saying no.  Be ready when someone offers you a drink. Try: \"Thanks, I've had enough.\" Or \"Thanks, but I'm cutting back.\" Or \"No, thanks. I feel better when I drink less.\"   Current as of: November 15, 2023  Content Version: 14.1 2006-2024 [employer].   Care instructions adapted under license by your healthcare professional. If you have questions about a medical condition or this instruction, always ask your healthcare professional. [employer] disclaims any warranty or liability for your use " of this information.

## 2025-01-24 ENCOUNTER — HOSPITAL ENCOUNTER (OUTPATIENT)
Dept: GENERAL RADIOLOGY | Facility: CLINIC | Age: 35
Discharge: HOME | End: 2025-01-24
Attending: STUDENT IN AN ORGANIZED HEALTH CARE EDUCATION/TRAINING PROGRAM | Admitting: STUDENT IN AN ORGANIZED HEALTH CARE EDUCATION/TRAINING PROGRAM
Payer: COMMERCIAL

## 2025-01-24 DIAGNOSIS — R05.1 ACUTE COUGH: ICD-10-CM

## 2025-01-24 PROCEDURE — 71046 X-RAY EXAM CHEST 2 VIEWS: CPT

## 2025-03-12 ENCOUNTER — MYC MEDICAL ADVICE (OUTPATIENT)
Dept: FAMILY MEDICINE | Facility: OTHER | Age: 35
End: 2025-03-12
Payer: COMMERCIAL

## 2025-03-12 ENCOUNTER — MYC REFILL (OUTPATIENT)
Dept: FAMILY MEDICINE | Facility: OTHER | Age: 35
End: 2025-03-12
Payer: COMMERCIAL

## 2025-03-12 DIAGNOSIS — F41.1 GAD (GENERALIZED ANXIETY DISORDER): ICD-10-CM

## 2025-05-15 ENCOUNTER — VIRTUAL VISIT (OUTPATIENT)
Dept: UROLOGY | Facility: CLINIC | Age: 35
End: 2025-05-15
Payer: COMMERCIAL

## 2025-05-15 DIAGNOSIS — Z30.09 VASECTOMY EVALUATION: Primary | ICD-10-CM

## 2025-05-15 RX ORDER — DIAZEPAM 5 MG/1
5 TABLET ORAL ONCE
Qty: 1 TABLET | Refills: 0 | Status: SHIPPED | OUTPATIENT
Start: 2025-05-15 | End: 2025-05-15

## 2025-05-15 NOTE — PATIENT INSTRUCTIONS
[facility]  Progress Note      SUBJECTIVE:     Yesterday the pt was upset about the potential for a colonoscopy-she has declined this procedure. Wants to go home. Pain controlled. Oncologic Hx:     The patient is a 49-year-old female with history of hyperlipidemia and diabetes who presented to the emergency room on 529 2 with chief complaints of fatigue, decreased appetite, weakness in her lower extremities, and a mass in the vulvar perineal region. The patient claims to have had the mass for many years and had suffered a nearly 50 pound weight loss. Gardenia Choe Physical examination showed a mass in that region prompting further radiographic studies. On 5/29/2022 a CT scan of the chest abdomen and pelvis was noteworthy for a large necrotic appearing mass in the vulvar rectal region. This measured 14.1 x 8.8 x 7.5 cm with foci of gas within the lesion suggesting ulceration/process. There were mildly enlarged bilateral inguinal nodes. Incidentally noted was an enlarged thyroid. She was seen by Dr. Isma Dickinson of gynecologic oncology on 5/30/2022 and she identified a 15 x 9 x 3 cm bilateral fungating necrotic mass just below the mons. Labs were no for there for a white count of 24, hemoglobin 8.7, hematocrit 28.6 and platelet count 161. Red blood cell indices were microcytic. Renal function was normal. The patient was also hypercalcemic with a calcium of 13.9 on 5/29/2022. On 5/30 it was down to 10.7 with hydration.  Today the calcium is 10.4 but the albumin is 2.1. (Calcium correction for hypoalbuminemia 11.9)    OBJECTIVE      Medications    Current Facility-Administered Medications: meropenem (MERREM) 500 mg IVPB (mini-bag), 500 mg, IntraVENous, Q6H  ferrous sulfate EC tablet 324 mg, 324 mg, Oral, Every Other Day  insulin glargine (LANTUS) injection vial 18 Units, 18 Units, SubCUTAneous, Nightly  traMADol (ULTRAM) tablet 25 mg, 25 mg, Oral, Q6H PRN  oxyCODONE (ROXICODONE) immediate release tablet Vasectomy    Understanding Vasectomy  Vasectomy is a simple, safe procedure that makes a man sterile (unable to father a child). It is the most effective birth control method for men.    Your Reproductive System  For pregnancy to occur, a man s sperm (male reproductive cells) must join with a woman s egg. To understand how a vasectomy works, you need to know how sperm are produced, stored, and released by the body.  The urethra is the tube in the center of the penis. It transports both urine and semen. When you have an orgasm, semen is ejaculated out of the urethra.  The seminal vesicles and the prostate gland secrete fluid called semen.  This sticky, white fluid helps nourish sperm and carry them along.  The epididymis is a coiled tube that holds the sperm while they mature.  The scrotum is a pouch of skin that contains the testes.  The testes are glands that produce sperm and male hormones.  The vas deferens is the tubes that carry the sperm from the epididymis to the penis.  Sperm carry genetic material.    Risks and Complications  A vasectomy is an outpatient (same day) procedure. It will be done in the clinic or in the same day surgery center. Before your vasectomy will be performed, you ll be asked to read and sign a consent form. This form stated you re aware of the possible risks and complications and understand that the procedure, though usually successful, is not guaranteed to make you sterile. Be sure that you have all your questions answered before signing this form. After the procedure, if you have any of the following or other symptoms you re concerned about, call your doctor.    Possible Risks and Complications  Vasectomy is a safe procedure. But it does have risks, including bleeding and infection.  You may also have any of the following after surgery.  Sperm granuloma is a small, harmless lump that may form where the vas deferens is sealed off.  Sperm buildup (congestion) may cause soreness in the  2.5 mg, 2.5 mg, Oral, Q3H PRN  rosuvastatin (CRESTOR) tablet 5 mg, 5 mg, Oral, Nightly  gabapentin (NEURONTIN) capsule 300 mg, 300 mg, Oral, QAM **AND** gabapentin (NEURONTIN) capsule 300 mg, 300 mg, Oral, Lunch **AND** gabapentin (NEURONTIN) capsule 300 mg, 300 mg, Oral, QPM **AND** gabapentin (NEURONTIN) capsule 900 mg, 900 mg, Oral, Nightly  insulin lispro (HUMALOG) injection vial 0-12 Units, 0-12 Units, SubCUTAneous, TID WC  insulin lispro (HUMALOG) injection vial 0-6 Units, 0-6 Units, SubCUTAneous, Nightly  glucose chewable tablet 16 g, 4 tablet, Oral, PRN  dextrose bolus 10% 125 mL, 125 mL, IntraVENous, PRN **OR** dextrose bolus 10% 250 mL, 250 mL, IntraVENous, PRN  glucagon (rDNA) injection 1 mg, 1 mg, IntraMUSCular, PRN  dextrose 5 % solution, 100 mL/hr, IntraVENous, PRN  sodium chloride flush 0.9 % injection 10 mL, 10 mL, IntraVENous, 2 times per day  sodium chloride flush 0.9 % injection 10 mL, 10 mL, IntraVENous, PRN  0.9 % sodium chloride infusion, , IntraVENous, PRN  ondansetron (ZOFRAN) injection 4 mg, 4 mg, IntraVENous, Q4H PRN  polyethylene glycol (GLYCOLAX) packet 17 g, 17 g, Oral, Daily PRN  acetaminophen (TYLENOL) tablet 650 mg, 650 mg, Oral, Q4H PRN **OR** acetaminophen (TYLENOL) suppository 650 mg, 650 mg, Rectal, Q4H PRN  0.9 % sodium chloride infusion, , IntraVENous, Continuous     Physical    VITALS:  /71   Pulse 88   Temp 98.4 °F (36.9 °C) (Oral)   Resp 16   Ht 5' 1\" (1.549 m)   Wt 130 lb 1.1 oz (59 kg)   SpO2 94%   BMI 24.58 kg/m²   TEMPERATURE:  Current - Temp: 98.4 °F (36.9 °C); Max - Temp  Av.6 °F (37 °C)  Min: 98.3 °F (36.8 °C)  Max: 99.1 °F (37.3 °C)  PULSE OXIMETRY RANGE: SpO2  Av.8 %  Min: 90 %  Max: 96 %  24HR INTAKE/OUTPUT:      Intake/Output Summary (Last 24 hours) at 2022  Last data filed at 2022  Gross per 24 hour   Intake 4307.76 ml   Output --   Net 4307.76 ml       CONSTITUTIONAL: Chronically ill-appearing, no acute distress. Pale. testes.  Anti-inflammatory medications can provide relief.  Epididymitis is inflammation that may cause scrotal aching. This often goes away without treatment. Occasionally, antibiotics are required.  Anti-inflammatory medications may provide relief.  Reconnection of the vas deferens can occur in rare cases. This makes you fertile again and can result in an unwanted pregnancy.  Sperm antibodies are a common response of the body to absorbed sperm. The antibodies can make you sterile, even if you later try to reverse your vasectomy.  Long term testicular discomfort may occur after surgery, but is very rare.    Vasectomy Preparation  Shave all hair from around the penis and the entire scrotum. You should do this on the day of the vasectomy: 2-4 hours prior to your appointment. This serves to cut down on the risk of infection. You may lather the scrotum with soap and water and shave with a disposable blade razor. Do not use an electric razor or depilatory hair creams.  After shaving the area, shower or bathe to remove all loose hairs.  Bring a scrotal support or tight cotton shorts with you on the day of your procedure.  Bring headphones/music if you would like to use during the procedure.  You may drive yourself to this procedure, only a local anesthetic is used.    During the Procedure  You will be asked to undress from the waist down and lie on the exam table. Antiseptic solution will be applied to the scrotal areas. Sterile drapes are placed over you to help prevent infection. You will be given a local anesthetic into your scrotum to prevent you from feeling pain. Once the anesthetic takes effect, the doctor makes one puncture in each side of the scrotum with a pointed clamp. Each of the two vasa deferentia are lifted through this puncture site. The vasa are cut, and a section is removed. You may feel a pulling sensation during this process. The two cut ends are sealed by heat (cauterized) and also tied. The  Thin  EYES: Sclera clear, conjunctiva normal  ENT: Oral pharynx unremarkable with moist mucus membranes  LUNGS: Clear to auscultation. No increased labor with breathing. CARDIOVASCULAR: Regular rate and rhythm, normal S1 and S2, no S3 or S4, and no murmur noted. ABDOMEN: Soft, non-distended, non-tender  MUSCULOSKELETAL: There is no redness, warmth, or swelling of the joints. NEUROLOGIC: Awake, alert. Grossly non-focal.  SKIN: No bruising or bleeding. Pale  PSYCH: Normal affect.       Data      Recent Labs     05/31/22 0431 06/01/22  0501 06/02/22  0509   WBC 21.8* 19.4* 22.4*   HGB 8.5* 9.1* 8.5*   HCT 27.6* 29.4* 27.0*    332 318   MCV 82.7 81.9 81.0        Recent Labs     05/31/22 0431 06/01/22  0501 06/02/22  0509    136 137   K 3.1* 3.8 3.6    104 105   CO2 25 22 24   BUN 11 10 8   CREATININE 0.5* <0.5* <0.5*     Recent Labs     05/31/22 0431 06/01/22  0501 06/02/22  0509   AST 39* 26 17   ALT 42* 37 28   BILITOT <0.2 <0.2 <0.2   ALKPHOS 80 82 78       Magnesium:    Lab Results   Component Value Date    MG 1.60 05/31/2022    MG 1.80 05/30/2022    MG 2.10 12/29/2015     Iron 37 - 145 ug/dL 10 Low     TIBC 260 - 445 ug/dL 111 Low     Iron Saturation 15 - 50 % 9 Low       Ferritin 209    Imaging CT Head WO Contrast    Result Date: 5/29/2022  EXAMINATION: CT OF THE HEAD WITHOUT CONTRAST  5/29/2022 5:38 pm TECHNIQUE: CT of the head was performed without the administration of intravenous contrast. Automated exposure control, iterative reconstruction, and/or weight based adjustment of the mA/kV was utilized to reduce the radiation dose to as low as reasonably achievable. COMPARISON: None. HISTORY: ORDERING SYSTEM PROVIDED HISTORY: Syncope, frequent falls TECHNOLOGIST PROVIDED HISTORY: Reason for exam:->Syncope, frequent falls Has a \"code stroke\" or \"stroke alert\" been called? ->No Decision Support Exception - unselect if not a suspected or confirmed emergency medical condition->Emergency Medical puncture is then sutured with a stitch.    After the Procedure  The local anesthetic begins to wear off after an hour or so. Any discomfort you feel is usually very mild. If you need it, pain reliever may help.    During Your Healing  Recovery time after a no-scalpel vasectomy is usually less than after a traditional vasectomy. For about a week, your scrotum may look bruised and slightly swollen.  You may also have a small amount of bloody discharge from the incision. This is normal.    To help make your recovery more comfortable, follow the tips below.  Stay off your feet as much as possible for the first few days to lessen the chance of swelling. Try to lie flat on a bed or sofa.  Reduce swelling by placing an ice pack or bag of frozen peas in a thin towel. Then place the towel on your scrotum.  Wear snug cotton briefs or an athletic supporter.  Take medications with acetaminophen (such as Tylenol) to relieve any discomfort.   Don t use aspirin, ibuprofen or naproxen.  Your doctor may give you a pain pill prescription.  Wait 48 hours before bathing.  Avoid heavy lifting or exercise for at least 10 days.  You can return to work in a day or two after the procedure.  You can begin having sex again two weeks after the procedure.    Note: You must use some form of birth control until your doctor says you re sterile.    Call your doctor if you notice any of the following after surgery:  Increasing pain or swelling in your scrotum  A large black-and-blue area, or a growing lump (some bruising is normal)  Fever or chills  Increasing redness or drainage of the incision    Sex after Vasectomy  Vasectomy doesn t change your sexual function. So when you start having sex again, it should feel the same as before. A vasectomy also shouldn t affect your relationship with your partner. It s important to remember, though, that you won t be sterile right away. It will take time before you can have sex without the need for birth  Condition (MA) FINDINGS: BRAIN/VENTRICLES: There is no acute intracranial hemorrhage, mass effect or midline shift. No abnormal extra-axial fluid collection. The gray-white differentiation is maintained without evidence of an acute infarct. There is no evidence of hydrocephalus. ORBITS: The visualized portion of the orbits demonstrate no acute abnormality. SINUSES: The visualized paranasal sinuses and mastoid air cells demonstrate no acute abnormality. SOFT TISSUES/SKULL:  No acute abnormality of the visualized skull or soft tissues. No acute intracranial abnormality. CT Cervical Spine WO Contrast    Result Date: 5/29/2022  EXAMINATION: CT OF THE CERVICAL SPINE WITHOUT CONTRAST 5/29/2022 5:39 pm TECHNIQUE: CT of the cervical spine was performed without the administration of intravenous contrast. Multiplanar reformatted images are provided for review. Automated exposure control, iterative reconstruction, and/or weight based adjustment of the mA/kV was utilized to reduce the radiation dose to as low as reasonably achievable. COMPARISON: None. HISTORY: ORDERING SYSTEM PROVIDED HISTORY: Syncope TECHNOLOGIST PROVIDED HISTORY: Reason for exam:->Syncope Decision Support Exception - unselect if not a suspected or confirmed emergency medical condition->Emergency Medical Condition (MA) FINDINGS: BONES/ALIGNMENT: There is no acute fracture or traumatic malalignment. DEGENERATIVE CHANGES: Mild, multilevel degenerative changes. SOFT TISSUES: There is no prevertebral soft tissue swelling. 1 cm right thyroid nodule. 1. No acute abnormality of the cervical spine. 2. Multilevel degenerative changes. RECOMMENDATIONS: 1 cm incidental right thyroid nodule. No follow-up imaging is recommended. Reference: J Am Riana Radiol. 2015 Feb;12(2): 143-50     US THYROID    Result Date: 6/1/2022  EXAMINATION: THYROID ULTRASOUND 6/1/2022 COMPARISON: None.  HISTORY: ORDERING SYSTEM PROVIDED HISTORY: heterogenous thyroid TECHNOLOGIST control.    Until you re sterile: After a vasectomy, some active sperm still remain in your semen. It will take time and many ejaculations before the sperm are completely gone. During this period, you must use another birth control method to prevent pregnancy. To make sure no sperm are left in your semen, you ll need to have at least one semen exam. You usually collect a semen sample at home and bring it to a lab. The sample is then checked under a microscope. You are sterile only when the sample(s) shows no evidence of sperm. Ask your doctor whether additional follow-up is needed.    After you re sterile: After your doctor tells you you re sterile, you no longer need to use any form of birth control. You re free to have sex without the fear of unwanted pregnancy. However, a vasectomy does not protect you from sexually transmitted diseases (STDs). If you have more than one sex partner, be sure to practice safer sex by using condoms.    PROVIDED HISTORY: Reason for exam:->heterogenous thyroid FINDINGS: Right thyroid lobe:  4.3 x 2.1 x 1.9 cm Left thyroid lobe:  4 x 1.5 x 1.4 cm Isthmus:  0.3 cm Thyroid Gland: The thyroid gland is heterogeneous in appearance. Few thyroid nodules are seen bilaterally. Nodules: NODULE: Right 1 Size: 15 x 9 x 14 mm Location: Mid right thyroid 1. Composition:  Solid (2) 2. Echogenicity:  Very hypoechoic (3) 3. Shape: Taller-than-wide (3) 4. Margins:  Ill-defined (0) 5. Echogenic foci:  Peripheral (rim) calcifications (2) ACR TI-RADS total points:  10 ACR TI-RADS risk category: TR5 Prior biopsy: Unknown. This meets criteria for FNA. NODULE: Right 2 Size: 10 x 11 x 11 mm Location: Inferior right thyroid 1. Composition:  Solid (2) 2. Echogenicity:  Hypoechoic (2) 3. Shape: Wider-than-tall (0) 4. Margins:  Ill-defined (0) 5. Echogenic foci:  None (0) ACR TI-RADS total points:  4 ACR TI-RADS risk category: TR4 Prior biopsy: Unknown. This meets criteria for annual follow-up per the schedule below. NODULE: Left 1 Size: 9 x 12 x 7 mm Location: Superior left thyroid 1. Composition:  Solid (2) 2. Echogenicity:  Isoechoic (1) 3. Shape: Wider-than-tall (0) 4. Margins:  Smooth (0) 5. Echogenic foci:  None (0) ACR TI-RADS total points:  3 ACR TI-RADS risk category: TR3 Prior biopsy: Unknown. No further follow-up required. NODULE: Left 2 Size: 11 x 11 x 11 mm Location: The mid left thyroid 1. Composition:  Solid (2) 2. Echogenicity:  Isoechoic (1) 3. Shape: Wider-than-tall (0) 4. Margins:  Smooth (0) 5. Echogenic foci:  None (0) ACR TI-RADS total points:  3 ACR TI-RADS risk category: TR3 Prior biopsy: Unknown. No further follow-up required. Cervical lymphadenopathy: No abnormal lymph nodes in the imaged portions of the neck. Multiple thyroid nodules. There is a very hypoechoic peripherally calcified TR 5 15 mm right thyroid nodule which meets criteria for FNA.  Additional TR 4 nodule in the right thyroid meets criteria for follow-up but not biopsy. TR 3 nodules in the left thyroid do not meet criteria for follow-up or biopsy. See recommendations below. RECOMMENDATIONS: ACR TI-RADS recommendations: TR5 (>= 7 points):  FNA if >= 1 cm; follow-up if 0.5-0.9 cm in 1, 2, 3, 4, and 5 years TR4 (4-6 points):  FNA if >= 1.5 cm; follow-up if 1.0-1.4 cm in 1, 2, 3, and 5 years TR3 (3 points):  FNA if >= 2.5 cm; follow-up if 1.5-2.4 cm in 1, 3, and 5 years TR2 (2 points):  No FNA or follow-up TR1 (0 points):  No FNA or follow-up ACR TI-RADS recommends that no more than two nodules with the highest ACR TI-RADS point total should be biopsied and no more than four nodules should be followed. XR CHEST PORTABLE    Result Date: 5/29/2022  EXAMINATION: ONE XRAY VIEW OF THE CHEST 5/29/2022 5:28 pm COMPARISON: 11/19/2015 HISTORY: ORDERING SYSTEM PROVIDED HISTORY: Syncope TECHNOLOGIST PROVIDED HISTORY: Reason for exam:->Syncope Reason for Exam: Extremity Weakness; Wound Check; Fatigue FINDINGS: Cardiomediastinal silhouette is normal in size. There is no pleural effusion or pneumothorax. The lungs are hyperinflated. No pulmonary consolidation. There is no acute osseous abnormality. No acute cardiopulmonary abnormality. CT CHEST ABDOMEN PELVIS W CONTRAST    Result Date: 5/29/2022  EXAMINATION: CT OF THE CHEST, ABDOMEN, AND PELVIS WITH CONTRAST 5/29/2022 6:41 pm TECHNIQUE: CT of the chest, abdomen and pelvis was performed with the administration of intravenous contrast. Multiplanar reformatted images are provided for review. Automated exposure control, iterative reconstruction, and/or weight based adjustment of the mA/kV was utilized to reduce the radiation dose to as low as reasonably achievable.  COMPARISON: 11/20/2015 HISTORY: ORDERING SYSTEM PROVIDED HISTORY: Large, necrotic appearing mass/swelling to vulva/rectal region; pt states has been present for \"years\" TECHNOLOGIST PROVIDED HISTORY: Reason for exam:->Large, necrotic appearing mass/swelling to vulva/rectal region; pt states has been present for \"years\" Additional Contrast?->None FINDINGS: Chest: Mediastinum: Thyroid is enlarged and heterogeneous. Heart size is normal without pericardial effusion. Thoracic aorta and main pulmonary artery are normal in caliber. Coronary artery atherosclerosis. No mediastinal lymphadenopathy. Lungs/pleura: There is no pleural effusion. There is no pneumothorax. There is no pulmonary consolidation. Soft Tissues/Bones: Multilevel degenerative changes of the thoracic spine. Abdomen/Pelvis: Organs: No acute abnormality within the liver, spleen, gallbladder, pancreas, or adrenal glands. No nephrolithiasis or hydronephrosis. GI/Bowel: Stomach is nondistended. The small bowel is nondilated. The colon is nondilated. Pelvis: Bladder is partially distended without vesicular stone. Uterus is present. Endometrial lesions are partially visualized, most consistent with fibroids. Peritoneum/Retroperitoneum: No ascites or pneumoperitoneum. Atherosclerosis of the nondilated abdominal aorta. There is a fat containing ventral abdominal wall hernia. Bones/Soft Tissues: Multilevel degenerative changes of the lumbar spine. There is a heterogeneously enhancing mass in the right vulva/perineum measuring up to 14.1 x 8.8 x 7.5 cm. There are foci of gas within this lesion, suggesting ulceration/necrosis. There are mildly enlarged bilateral inguinal lymph nodes. 1. No acute intrathoracic abnormality. 2. Large heterogeneously enhancing mass in the right vulva/perineum is concerning for a malignant process. There is also gas within this lesion, suggesting ulceration. Clinical correlation and tissue sampling are recommended for further evaluation. 3. Bilateral inguinal lymphadenopathy. 4. Enlarged, heterogeneous thyroid. Ultrasound is recommended. RECOMMENDATIONS: Incidental heterogeneous and enlarged thyroid. Recommend thyroid US. Reference: J Am Riana Radiol.  2015 Feb;12(2): 143-50       Problem List  Patient Active Problem List   Diagnosis    DMII (diabetes mellitus, type 2) ([facility] [address]    Duodenal ulcer    Pain in both lower extremities    Varicosities of leg    Elevated blood pressure reading    Mixed hyperlipidemia    Type 2 diabetes mellitus with diabetic neuropathy    Acute cystitis with hematuria    Severe sepsis (HCC)    Septic shock (HCC)    Tachycardia    Neutrophilic leukocytosis    Elevated lactic acid level    Elevated liver function tests    Generalized weakness    Fatigue    Vulvar mass    Enlarged, heterogeneous thyroid    Severe malnutrition ([address]       ASSESSMENT AND PLAN      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 These are somewhat uncommon tumors with just over 6000 cases reported nationally each year and approximately 1500 deaths. Almost always these are HPV associated tumors. Squamous cell histology is the most common histology but other rare types can also be seen. We still need pathologic confirmation of the tumor type.       Squamous cell tumors are often associated with hypercalcemia malignancy. The patient has been hypercalcemic and is borderline in need of treatment even today with a calcium of 10.4. Corrected it is 11.9. We went ahead and dosed the patient with pamidronate on 5/31/22 as it would be a bit before the patient is actually treated for her causative cancer. Ca stable     Today met at length with the patient and talk to her about her cancer diagnosis. We talked about the nature of that illness, its epidemiology, risk factors, and management. The patient currently is not resectable but would be a candidate for chemoradiation. The rationale for chemoradiation may be found below in the section taken from up-to-date. To facilitate treatment the patient would require port placement. She will be seen later today by radiation oncology and we will need to work with them as well.   I would recommend weekly cisplatin to be given con currently. Side effects of cisplatin were discussed. The patient currently is on gabapentin and has a history of diabetes but does not have diabetic neuropathy. The gabapentin was given to her for management of her varicose veins. Whether this is neuropathic I am not sure. We will monitor. Effective treatment on her comorbidities were discussed. The patient is very much aware of the fact that treatment could be challenging and that will require adherence to treatment recommendations if we are to keep her as an outpatient. Family was not at bedside for today's conversation. We will arrange for further teaching of not only the patient but family as well.     The patient was noted to have an abnormal thyroid. An ultrasound has been recommended.     The patient has a significant, symptomatic anemia. Red blood cell indices would suggest that this could be iron deficiency. Confirmed with iron testing. The patient is on oral iron. Colonoscopy was recommended for both the evaluation of her vulvar cancer and to evaluate the Fe def. She refused. Will get bx today to confirm histology. Discussed via secured txt with Dr. Al Penny. Pain controlled. Remains on IV Abx for UTI. Consider change to PO     The patient's albumin is only 2.1 and she looks a bit nutritionally deplete. Consult dietary. Ensure supplementation. Will order port. Can do as OP if desired.       Ngozi Martin MD, MPH

## 2025-05-15 NOTE — NURSING NOTE
Current patient location: [place]    Is the patient currently in the state of [place]? YES    Visit mode: VIDEO    If the visit is dropped, the patient can be reconnected by:VIDEO VISIT: Text to cell phone:   Telephone Information:   Mobile [phone]       Will anyone else be joining the visit? NO  (If patient encounters technical issues they should call [phone]    Are changes needed to the allergy or medication list? Pt stated no med changes    Are refills needed on medications prescribed by this physician? NO    Rooming Documentation:  Not applicable    Reason for visit: Consult (Vasectomy )    Babs NGUYEN

## 2025-05-15 NOTE — PROGRESS NOTES
VASECTOMY CONSULTATION NOTE  DATE OF VISIT: 5/15/2025  RUSSELL JACKMAN  PATIENT NAME: Shlomo Nelson    YOB: 1990      REASON FOR CONSULTATION: Mr. Shlomo Nelson is a 35 year old year old gentleman who is seen today requesting a vasectomy. He has 4 children - 8, 6, 4, and 1 year old - and he wishes to have a vasectomy for birth control. His wife is in agreement with this plan.         PAST MEDICAL HISTORY:   Past Medical History:   Diagnosis Date    NO ACTIVE PROBLEMS        PAST SURGICAL HISTORY:   Past Surgical History:   Procedure Laterality Date    FINGER SURGERY         MEDICATIONS:   Current Outpatient Medications:     losartan (COZAAR) 50 MG tablet, Take 1 tablet (50 mg) by mouth daily, Disp: 90 tablet, Rfl: 3    sertraline (ZOLOFT) 50 MG tablet, Take 1 tablet (50 mg) by mouth daily, Disp: 90 tablet, Rfl: 3    ALLERGIES: No Known Allergies    FAMILY HISTORY:   Family History   Problem Relation Age of Onset    Lipids Father     Hypertension Maternal Grandmother     Hypertension Maternal Grandfather     Diabetes Paternal Grandmother     Hypertension Paternal Grandmother     Arthritis Paternal Grandmother     Diabetes Paternal Grandfather     Hypertension Paternal Grandfather     Prostate Cancer Paternal Grandfather     Cancer - colorectal Paternal Grandfather     Cancer Paternal Grandfather         kidney    Gastrointestinal Disease Brother         IBS    Asthma No family hx of     C.A.D. No family hx of     Cerebrovascular Disease No family hx of     Alcohol/Drug No family hx of     Allergies No family hx of     Alzheimer Disease No family hx of     Anesthesia Reaction No family hx of     Blood Disease No family hx of     Cardiovascular No family hx of     Circulatory No family hx of     Congenital Anomalies No family hx of     Connective Tissue Disorder No family hx of     Depression No family hx of     Eye Disorder No family hx of     Genetic Disorder No family hx of     Genitourinary Problems No  family hx of     Gynecology No family hx of     Heart Disease No family hx of     Musculoskeletal Disorder No family hx of     Neurologic Disorder No family hx of     Obesity No family hx of     Osteoporosis No family hx of     Psychotic Disorder No family hx of     Respiratory No family hx of     Thyroid Disease No family hx of     Hearing Loss No family hx of        SOCIAL HISTORY:   Social History     Socioeconomic History    Marital status: Single     Spouse name: Not on file    Number of children: Not on file    Years of education: Not on file    Highest education level: Not on file   Occupational History    Not on file   Tobacco Use    Smoking status: Never     Passive exposure: Never    Smokeless tobacco: Current     Types: Chew   Vaping Use    Vaping status: Never Used   Substance and Sexual Activity    Alcohol use: Yes     Comment: twice a week; few drinks    Drug use: No    Sexual activity: Yes     Partners: Female     Birth control/protection: I.U.D.   Other Topics Concern    Parent/sibling w/ CABG, MI or angioplasty before 65F 55M? No   Social History Narrative    Not on file     Social Drivers of Health     Financial Resource Strain: Low Risk  (8/5/2024)    Financial Resource Strain     Within the past 12 months, have you or your family members you live with been unable to get utilities (heat, electricity) when it was really needed?: No   Food Insecurity: Low Risk  (8/5/2024)    Food Insecurity     Within the past 12 months, did you worry that your food would run out before you got money to buy more?: No     Within the past 12 months, did the food you bought just not last and you didn t have money to get more?: No   Transportation Needs: Low Risk  (8/5/2024)    Transportation Needs     Within the past 12 months, has lack of transportation kept you from medical appointments, getting your medicines, non-medical meetings or appointments, work, or from getting things that you need?: No   Physical Activity:  Unknown (8/5/2024)    Exercise Vital Sign     Days of Exercise per Week: 3 days     Minutes of Exercise per Session: Not on file   Stress: No Stress Concern Present (8/5/2024)    [employer] - Occupational Stress Questionnaire     Feeling of Stress : Not at all   Social Connections: Unknown (8/5/2024)    Social Connection and Isolation Panel [NHANES]     Frequency of Communication with Friends and Family: Not on file     Frequency of Social Gatherings with Friends and Family: Twice a week     Attends Congregational Services: Not on file     Active Member of Clubs or Organizations: Not on file     Attends Club or Organization Meetings: Not on file     Marital Status: Not on file   Interpersonal Safety: Low Risk  (8/5/2024)    Interpersonal Safety     Do you feel physically and emotionally safe where you currently live?: Yes     Within the past 12 months, have you been hit, slapped, kicked or otherwise physically hurt by someone?: No     Within the past 12 months, have you been humiliated or emotionally abused in other ways by your partner or ex-partner?: No   Housing Stability: Low Risk  (8/5/2024)    Housing Stability     Do you have housing? : Yes     Are you worried about losing your housing?: No       PHYSICAL EXAM  Patient is a 35 year old  male   Vitals: There were no vitals taken for this visit.  There is no height or weight on file to calculate BMI.  General Appearance Adult:   Alert, no acute distress, oriented  Neuro: Alert, oriented, speech and mentation normal  Psych: affect and mood normal       DIAGNOSIS: Request for sterilization    PLAN: The risks of the procedure as well as expectations for recovery and outcomes were explained in detail to him.  He was counseled on the risks for bleeding infection and pain after the procedure. We discussed the risk of post-vasectomy pain syndrome.  He was instructed to continue to use contraception until he had proven azoospermia on a semen  specimen.  This would normally be collected at least 3 months after the procedure. Also discussed the rare, but possible risk of re-canalization of the vas, even after successful vasectomy with sterile semen specimen.  He was instructed to hold all anticoagulants medications for one week prior to the procedure.  It was recommended that he have someone else drive him home after his vasectomy.  In light of these risks and expectations he would like to proceed.  We are scheduling a vasectomy in the office in the near future.    Pt. Understands:  -1/1000-1/3000 risk of future pregnancy even with perfectly done vasectomy  -vasectomy is a permanent procedure    -he may cryopreserve sperm if he wishes   -1-5% risk of post-vasectomy pain syndrome   -1-5% risk of complication, primarily infection or bleeding  - he needs to have a semen sample that shows no sperm before getting approval for unprotected intercourse.      He does note some anxiety just considering the procedure and would like to have a Valium pill and prescription sent to the pharmacy    Thank you for the kind consultation.    Time spent: 12 minutes spent on the date of the encounter doing chart review, history and exam, documentation and further activities as noted above.     Kyler Narvaez MD   Urology  [facility]  Clinic Phone [phone]        Virtual Visit Details    Type of service:  Video Visit     Originating Location (pt. Location): Home / Work    Distant Location (provider location):  On-site  Platform used for Video Visit: Nikolas

## 2025-06-12 ENCOUNTER — OFFICE VISIT (OUTPATIENT)
Dept: UROLOGY | Facility: CLINIC | Age: 35
End: 2025-06-12
Payer: COMMERCIAL

## 2025-06-12 ENCOUNTER — ALLIED HEALTH/NURSE VISIT (OUTPATIENT)
Dept: NURSING | Facility: CLINIC | Age: 35
End: 2025-06-12
Payer: COMMERCIAL

## 2025-06-12 VITALS — SYSTOLIC BLOOD PRESSURE: 145 MMHG | HEART RATE: 83 BPM | DIASTOLIC BLOOD PRESSURE: 92 MMHG

## 2025-06-12 VITALS — HEART RATE: 83 BPM | SYSTOLIC BLOOD PRESSURE: 145 MMHG | DIASTOLIC BLOOD PRESSURE: 92 MMHG

## 2025-06-12 DIAGNOSIS — Z30.2 ENCOUNTER FOR STERILIZATION: Primary | ICD-10-CM

## 2025-06-12 DIAGNOSIS — Z30.2 ENCOUNTER FOR VASECTOMY: Primary | ICD-10-CM

## 2025-06-12 RX ORDER — DIAZEPAM 5 MG/1
5 TABLET ORAL
COMMUNITY
Start: 2025-05-15

## 2025-06-12 NOTE — NURSING NOTE
Shlomo Nelson's goals for this visit include:   Chief Complaint   Patient presents with    Vasectomy     Voluntary Sterilization       He requests these members of his care team be copied on today's visit information:     PCP: Xiomara Moralez    Referring Provider:  Referred Self, MD  No address on file    BP (!) 145/92 (BP Location: Left arm, Patient Position: Sitting, Cuff Size: Adult Regular)   Pulse 83     Do you need any medication refills at today's visit?     Cinda Ornelsa MA on 6/12/2025 at 2:42 PM

## 2025-06-12 NOTE — NURSING NOTE
Shlomo Nelson comes into clinic today at the request of  Ordering Provider for valium prior to vasectomy for the diagnosis voluntary sterilization.      Patient arrived to the with his spouse. Patient's spouse will be driving patient after procedure. Patient consent and had no questions about the procedure patient signed the consent and took medication in front of staff.      This service provided today was under the supervising provider of the day , who was available if needed.    Cinda Ornelas MA on 6/12/2025 at 2:46 PM

## 2025-06-12 NOTE — PATIENT INSTRUCTIONS
Vasectomy Post-Op Care Instructions     You may go home after the procedure is completed. There may be some pain in your groin for 3 or 4 days after the operation. Some blood or yellow liquid may ooze from the cuts on the outside. The area around the cuts may swell and bruise. The sutures will dissolve on their own and do not require removal by the physician.    The first 48 hours after the procedure are crucial to healing. Generally, you may feel very good the day after the procedure, but that does not mean it is time to go back to normal activities. Resuming normal activities too soon is likely to cause internal bleeding and lots of pain.      Your provider may advise the following ways to care for yourself after the procedure:    Put an ice bag or package of frozen peas covered by a thin towel over the scrotum after the procedure. Leave the ice on the area for 30 minutes and then take it off for 30 minutes. Do this off and on for at least 24 hours.      Avoid all heavy lifting (greater than 10 pounds) for at least one week.    Wear a jockstrap or tight-fitting underwear for approximately one week to support the scrotum (testicles) and help reduce discomfort.    Take a pain reliever, such as Acetaminophen (Tylenol) or Ibuprofen (Advil), for any pain after the procedure. Your provider may prescribe a stronger pain medicine if it is needed.    You should be able to return to work in 48 hours, but strenuous activity should be avoided for two weeks.    Do not submerge the incision for 1 week following the procedure.    Ejaculation should be avoided for one week to allow the area to heal.    Follow-Up    You will need to have a negative post vasectomy analyses (no sperm seen) before you can discontinue birth control.    Semen Analysis   Schedule the post-vasectomy semen analysis three months after your vasectomy. You will need to ejaculate at least 20 times between your surgery and the first sample. Clinic staff will  contact your regarding your results via phone.    You will be given a container after the procedure. Collect the specimen at home by masturbation only (no lubrication, powders, saliva, or intercourse can be used) and bring it to the laboratory. You must have an appointment to drop off your specimen. The specimen needs to be delivered to the lab within 30-45 minutes.    Call [phone] with questions. For concerns or questions after hours or on weekends, please page the Urology Resident on-call: [phone].

## 2025-06-12 NOTE — PROGRESS NOTES
OFF[facility] OPERATIVE NOTE  [facility]    DATE: 06/12/25  PATIENT: Shlomo Nelson    YOB: 1990    Shlomo Nelson is a 35 year old male.  He has children and he wishes a vasectomy for birth control.  He has read the brochure and he has shaved himself.  I reviewed the vasectomy procedure with him explaining that it would be done with a local anesthetic given just in the location where the vasectomy would be done.  It would be done through incisions with the removal of segments of the vasa, cauterization of the ends, and burying the ends separate with sutures.      Pt. Understands:  -1/1000-1/3000 risk of future pregnancy even with perfectly done vasectomy  -vasectomy is a permanent procedure    -he may cryopreserve sperm if he wishes   -1-5% risk of post-vasectomy pain syndrome   -1-5% risk of complication, primarily infection or bleeding  - he needs to have a semen sample that shows no sperm before getting approval for unprotected intercourse.      Complications such as bleeding, infection, and damage to other tissues in the area were discussed. I recommended that an ice bag be placed on the scrotum off and on tonight to help reduce pain and swelling.      He was reminded that he was not sterile immediately after the vasectomy that it would take at least 20 ejaculations to empty the vas of any remaining sperm.  He was not to provide a semen sample until after the 20th ejaculation and not before 12 weeks after the vas. He was  to fulfill both of those requirements.   He understands it is his responsibility to find out the results of the vas before proceeding with intercourse without birth control protection.  Other items discussed were activity afterwards, returning to work, voluntary physical activity,  resuming sexual activity, clothing to wear, bathing, and care of the vas site and expected changes in the site as healing progresses.  After signing the permit, bilateral vasectomy was done as  described below.     ANESTHESIA: Local    DETAILS OF PROCEDURE: The risks of the procedure were explained in detail to the patient and informed consent was obtained. The patient was placed supine on the procedure table and the penis and scrotum were prepped and draped in the standard sterile fashion. The right vas deferens was isolated and brought up to the skin. 1% lidocaine local anesthesia was used to infiltrate the skin and the spermatic cord. A small incision was created and adventitial tissues were swept away from the vas. A 1 cm segment of the vas was excised and sent for pathology. The proximal and distal lumina of the vas were cauterized and then each segment was tied off in a knuckling-fashion with a 3-0 vicryl suture. Hemostasis was ensured and the segments were released back into the scrotum. Meticulous hemostasis was achieved. The skin was closed with 3-0 chromic suture in a horizontal mattress fashion. Next the left vas was brought to the skin and a vasectomy was performed in the similar fashion. The skin was closed with 3-0 chromic suture in a horizontal mattress fashion.    COMPLICATIONS: None    TAKE HOME MEDICATIONS: Tylenol every 6 hours, PRN    DISMISSAL INSTRUCTIONS:  - Ice pack to scrotum 15 to 20 minutes each hour awake for 36 to 40 hours.  - No strenuous activity or ejaculation for at least 7 days.  - No unprotected sexual activity until proven azoospermia on semen samples at 3 months.  - Referred to patient handout for normal postop expectations and indications to contact nurse or physician.    M.D.: Kyler Narvaez MD

## 2025-07-14 ENCOUNTER — PATIENT OUTREACH (OUTPATIENT)
Dept: CARE COORDINATION | Facility: CLINIC | Age: 35
End: 2025-07-14
Payer: COMMERCIAL

## 2025-08-18 ENCOUNTER — OFFICE VISIT (OUTPATIENT)
Dept: FAMILY MEDICINE | Facility: OTHER | Age: 35
End: 2025-08-18
Payer: COMMERCIAL

## 2025-08-18 VITALS
HEART RATE: 70 BPM | HEIGHT: 69 IN | OXYGEN SATURATION: 97 % | RESPIRATION RATE: 18 BRPM | WEIGHT: 184 LBS | SYSTOLIC BLOOD PRESSURE: 150 MMHG | TEMPERATURE: 97.6 F | BODY MASS INDEX: 27.25 KG/M2 | DIASTOLIC BLOOD PRESSURE: 90 MMHG

## 2025-08-18 DIAGNOSIS — E78.1 HYPERTRIGLYCERIDEMIA: ICD-10-CM

## 2025-08-18 DIAGNOSIS — Z00.00 ROUTINE GENERAL MEDICAL EXAMINATION AT A HEALTH CARE FACILITY: Primary | ICD-10-CM

## 2025-08-18 DIAGNOSIS — E78.5 HYPERLIPIDEMIA LDL GOAL <100: ICD-10-CM

## 2025-08-18 DIAGNOSIS — F10.10 ALCOHOL ABUSE, EPISODIC DRINKING BEHAVIOR: ICD-10-CM

## 2025-08-18 DIAGNOSIS — Z23 NEED FOR TDAP VACCINATION: ICD-10-CM

## 2025-08-18 DIAGNOSIS — I10 HYPERTENSION GOAL BP (BLOOD PRESSURE) < 130/80: ICD-10-CM

## 2025-08-18 DIAGNOSIS — F41.1 GAD (GENERALIZED ANXIETY DISORDER): ICD-10-CM

## 2025-08-18 LAB
ALBUMIN SERPL BCG-MCNC: 4.9 G/DL (ref 3.5–5.2)
ALP SERPL-CCNC: 71 U/L (ref 40–150)
ALT SERPL W P-5'-P-CCNC: 26 U/L (ref 0–70)
ANION GAP SERPL CALCULATED.3IONS-SCNC: 14 MMOL/L (ref 7–15)
AST SERPL W P-5'-P-CCNC: 19 U/L (ref 0–45)
BILIRUB SERPL-MCNC: 0.7 MG/DL
BUN SERPL-MCNC: 14.5 MG/DL (ref 6–20)
CALCIUM SERPL-MCNC: 9.9 MG/DL (ref 8.8–10.4)
CHLORIDE SERPL-SCNC: 102 MMOL/L (ref 98–107)
CHOLEST SERPL-MCNC: 223 MG/DL
CREAT SERPL-MCNC: 0.82 MG/DL (ref 0.67–1.17)
EGFRCR SERPLBLD CKD-EPI 2021: >90 ML/MIN/1.73M2
FASTING STATUS PATIENT QL REPORTED: ABNORMAL
FASTING STATUS PATIENT QL REPORTED: ABNORMAL
GLUCOSE SERPL-MCNC: 100 MG/DL (ref 70–99)
HCO3 SERPL-SCNC: 25 MMOL/L (ref 22–29)
HDLC SERPL-MCNC: 44 MG/DL
LDLC SERPL CALC-MCNC: 120 MG/DL
NONHDLC SERPL-MCNC: 179 MG/DL
POTASSIUM SERPL-SCNC: 4.4 MMOL/L (ref 3.4–5.3)
PROT SERPL-MCNC: 7.4 G/DL (ref 6.4–8.3)
SODIUM SERPL-SCNC: 141 MMOL/L (ref 135–145)
TRIGL SERPL-MCNC: 294 MG/DL

## 2025-08-18 PROCEDURE — 90715 TDAP VACCINE 7 YRS/> IM: CPT | Performed by: PHYSICIAN ASSISTANT

## 2025-08-18 PROCEDURE — 3080F DIAST BP >= 90 MM HG: CPT | Performed by: PHYSICIAN ASSISTANT

## 2025-08-18 PROCEDURE — G2211 COMPLEX E/M VISIT ADD ON: HCPCS | Performed by: PHYSICIAN ASSISTANT

## 2025-08-18 PROCEDURE — 99214 OFFICE O/P EST MOD 30 MIN: CPT | Mod: 25 | Performed by: PHYSICIAN ASSISTANT

## 2025-08-18 PROCEDURE — 1126F AMNT PAIN NOTED NONE PRSNT: CPT | Performed by: PHYSICIAN ASSISTANT

## 2025-08-18 PROCEDURE — 36415 COLL VENOUS BLD VENIPUNCTURE: CPT | Performed by: PHYSICIAN ASSISTANT

## 2025-08-18 PROCEDURE — 99395 PREV VISIT EST AGE 18-39: CPT | Mod: 24 | Performed by: PHYSICIAN ASSISTANT

## 2025-08-18 PROCEDURE — 80061 LIPID PANEL: CPT | Performed by: PHYSICIAN ASSISTANT

## 2025-08-18 PROCEDURE — 90471 IMMUNIZATION ADMIN: CPT | Performed by: PHYSICIAN ASSISTANT

## 2025-08-18 PROCEDURE — 3075F SYST BP GE 130 - 139MM HG: CPT | Performed by: PHYSICIAN ASSISTANT

## 2025-08-18 PROCEDURE — 80053 COMPREHEN METABOLIC PANEL: CPT | Performed by: PHYSICIAN ASSISTANT

## 2025-08-18 RX ORDER — FENOFIBRATE 145 MG/1
145 TABLET, FILM COATED ORAL DAILY
Qty: 90 TABLET | Refills: 3 | Status: SHIPPED | OUTPATIENT
Start: 2025-08-18

## 2025-08-18 RX ORDER — LOSARTAN POTASSIUM 100 MG/1
100 TABLET ORAL DAILY
Qty: 90 TABLET | Refills: 3 | Status: SHIPPED | OUTPATIENT
Start: 2025-08-18

## 2025-08-18 SDOH — HEALTH STABILITY: PHYSICAL HEALTH: ON AVERAGE, HOW MANY DAYS PER WEEK DO YOU ENGAGE IN MODERATE TO STRENUOUS EXERCISE (LIKE A BRISK WALK)?: 3 DAYS

## 2025-08-18 SDOH — HEALTH STABILITY: PHYSICAL HEALTH: ON AVERAGE, HOW MANY MINUTES DO YOU ENGAGE IN EXERCISE AT THIS LEVEL?: 60 MIN

## 2025-08-18 ASSESSMENT — ANXIETY QUESTIONNAIRES
5. BEING SO RESTLESS THAT IT IS HARD TO SIT STILL: NOT AT ALL
6. BECOMING EASILY ANNOYED OR IRRITABLE: SEVERAL DAYS
8. IF YOU CHECKED OFF ANY PROBLEMS, HOW DIFFICULT HAVE THESE MADE IT FOR YOU TO DO YOUR WORK, TAKE CARE OF THINGS AT HOME, OR GET ALONG WITH OTHER PEOPLE?: SOMEWHAT DIFFICULT
4. TROUBLE RELAXING: NOT AT ALL
GAD7 TOTAL SCORE: 5
1. FEELING NERVOUS, ANXIOUS, OR ON EDGE: SEVERAL DAYS
3. WORRYING TOO MUCH ABOUT DIFFERENT THINGS: SEVERAL DAYS
2. NOT BEING ABLE TO STOP OR CONTROL WORRYING: SEVERAL DAYS
GAD7 TOTAL SCORE: 5
IF YOU CHECKED OFF ANY PROBLEMS ON THIS QUESTIONNAIRE, HOW DIFFICULT HAVE THESE PROBLEMS MADE IT FOR YOU TO DO YOUR WORK, TAKE CARE OF THINGS AT HOME, OR GET ALONG WITH OTHER PEOPLE: SOMEWHAT DIFFICULT
7. FEELING AFRAID AS IF SOMETHING AWFUL MIGHT HAPPEN: SEVERAL DAYS
7. FEELING AFRAID AS IF SOMETHING AWFUL MIGHT HAPPEN: SEVERAL DAYS
GAD7 TOTAL SCORE: 5

## 2025-08-18 ASSESSMENT — SOCIAL DETERMINANTS OF HEALTH (SDOH): HOW OFTEN DO YOU GET TOGETHER WITH FRIENDS OR RELATIVES?: TWICE A WEEK

## 2025-08-18 ASSESSMENT — PATIENT HEALTH QUESTIONNAIRE - PHQ9
SUM OF ALL RESPONSES TO PHQ QUESTIONS 1-9: 1
SUM OF ALL RESPONSES TO PHQ QUESTIONS 1-9: 1
10. IF YOU CHECKED OFF ANY PROBLEMS, HOW DIFFICULT HAVE THESE PROBLEMS MADE IT FOR YOU TO DO YOUR WORK, TAKE CARE OF THINGS AT HOME, OR GET ALONG WITH OTHER PEOPLE: SOMEWHAT DIFFICULT

## 2025-08-18 ASSESSMENT — PAIN SCALES - GENERAL: PAINLEVEL_OUTOF10: NO PAIN (0)
